# Patient Record
Sex: FEMALE | Race: WHITE | NOT HISPANIC OR LATINO | Employment: OTHER | URBAN - METROPOLITAN AREA
[De-identification: names, ages, dates, MRNs, and addresses within clinical notes are randomized per-mention and may not be internally consistent; named-entity substitution may affect disease eponyms.]

---

## 2017-06-29 ENCOUNTER — OFFICE VISIT (OUTPATIENT)
Dept: DERMATOLOGY | Facility: CLINIC | Age: 82
End: 2017-06-29

## 2017-06-29 DIAGNOSIS — L66.12 FRONTAL FIBROSING ALOPECIA: ICD-10-CM

## 2017-06-29 DIAGNOSIS — Z85.828 HISTORY OF NONMELANOMA SKIN CANCER: ICD-10-CM

## 2017-06-29 DIAGNOSIS — L82.1 SK (SEBORRHEIC KERATOSIS): Primary | ICD-10-CM

## 2017-06-29 RX ORDER — KETOCONAZOLE 20 MG/ML
SHAMPOO TOPICAL DAILY PRN
Qty: 120 ML | Refills: 11 | Status: SHIPPED | OUTPATIENT
Start: 2017-06-29 | End: 2018-06-04

## 2017-06-29 ASSESSMENT — PAIN SCALES - GENERAL: PAINLEVEL: NO PAIN (0)

## 2017-06-29 NOTE — LETTER
6/29/2017       RE: Barbi Rebolledo  [address] [address]     Dear Colleague,    Thank you for referring your patient, Barbi Rebolledo, to the [facility] DERMATOLOGY at [facility]. Please see a copy of my visit note below.    DERMATOLOGY CLINIC RETURN VISIT   June 29, 2017  Last visit: 7/7/16    DERMATOLOGY PROBLEM LIST  1. NMSC  - BCC, L nasal ala, s/p MMS Feb 2016  - superficial BCC of the right neck (2006)   2. FFA  - stable with betamethasone dipropionate lotion weekly; could benefit from more frequent use.  3. Nummular dermatitis, resolved  - fluocinonide 0.05% ointment to trunk/extremities PRN  4. Seborrheic keratoses  TBSE: 6/29/17  CC: FFA, follow-up NMSC    SUBJECTIVE: Ms. Rebolledo is a pleasant 85 year old female returning for routine follow-up. She reports no skin concerns. Her previous NMSC site has healed well and she is pleased with cosmesis.    She reports mild worsening of her FFA, but is not experiencing itching or distressed by the cosmetic aspect. She has not been using betamethasone lotion, but has continued with twice weekly ketoconazole 2% shampoo.  No other skin concerns or rashes. No other rapidly-growing, bleeding, tender, nonhealing, painful, or tender lesions elsewhere.  No recent illness, fevers, chills, drenching night sweats, loss of appetite, or unexpected weight changes.    ROS: See HPI    OBJECTIVE:   GEN: Pleasant elderly female, no acute discomfort or distress.   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 Ilia part width 1.2 anteriorly, light brown terminal hair regrowth 3-4 cm, and mild  seborrhea. Stable terminal hair growth compared to previous examination  - left nasal ala: well-healed linear scar, no evidence of recurrent disease. 3mm pearly-pink centrally-crusted friable papule  - multiple light brown variably keratotic papules/plaques on the trunk, none inflamed or traumatized.  \and PLAN   1. Hx. NMSC (including nBCC, L nasal ala)  - NER    2. FFA. Mild continued activity, goal per pt is to prevent progression.  - discussed chronic nature of this condition.  - continue ketoconazole 2% shampoo alternated with over-the-counter dandruff shampoo (for seborrheic dermatitis). Have encouraged using this at least 2-3 times per week.  - if she wishes, OK to restart betamethasone dipropionate 0.05% lotion to the frontal scalp twice daily for 2 weeks, then q2d  - discussed upcoming PRP trial; she is interested and will be added to the contact list  3. Seborrheic keratoses  - Discussed etiology, natural history, and reassured of the benign nature of these lesions. No treatment required.  RTC 1 year.    Discussed and examined with staff dermatologist Dr. Maricel Barbour.    Alfredito Ash MD  Dermatology Resident, PGY-4  Pager: [phone]

## 2017-06-29 NOTE — MR AVS SNAPSHOT
After Visit Summary   2017    Barbi Rebolledo    MRN: [id number]           Patient Information     Date Of Birth          1930        Visit Information        Provider Department      2017 9:30 AM Alfredito Ash MD [facility] Dermatology        Today's Diagnoses     SK (seborrheic keratosis)    -  1    Frontal fibrosing alopecia        History of nonmelanoma skin cancer           Follow-ups after your visit        Who to contact     Please call your clinic at [phone] to:    Ask questions about your health    Make or cancel appointments    Discuss your medicines    Learn about your test results    Speak to your doctor   If you have compliments or concerns about an experience at your clinic, or if you wish to file a complaint, please contact [facility] Patient Relations at [phone] or email us at [email or web address]         Additional Information About Your Visit        MyChart Information     Dining Secretaryt is an electronic gateway that provides easy, online access to your medical records. With [employer], you can request a clinic appointment, read your test results, renew a prescription or communicate with your care team.     To sign up for [employer] Secretary[employer] visit the website at [email or web address]   You will be asked to enter the access code listed below, as well as some personal information. Please follow the directions to create your username and password.     Your access code is: [email or web address]  Expires: 2017  6:30 AM     Your access code will  in 90 days. If you need help or a new code, please contact your [facility] or call [phone] for assistance.        Care EveryWhere ID     This is your Care EveryWhere ID. This could be used by other organizations to access your [place] medical records  [id number]         Blood Pressure from Last 3 Encounters:   No data found for BP    Weight from Last 3  Encounters:   No data found for Wt              Today, you had the following     No orders found for display         Where to get your medicines      These medications were sent to [facility] [address] [place] [address] AT [address] [place] [address] & [address] [address]     Phone:  [phone]     ketoconazole 2 % shampoo          Primary Care Provider Office Phone # Fax #    Cristina Balderas [phone]       [facility] [address] [facility] [address]        Equal Access to Services     [facility]: Hadii aad ku hadasho Soomaali, waaxda luqadaha, qaybta kaalmada adeegyada, stacy cabello hayjania adegabrielle galarza . So [facility] [phone].    ATENCIÓN: Si habla español, tiene a lopes disposición servicios gratuitos de asistencia lingüística. [facility] [phone].    We comply with applicable federal civil rights laws and [place] laws. We do not discriminate on the basis of race, color, national origin, age, disability sex, sexual orientation or gender identity.            Thank you!     Thank you for choosing [facility] DERMATOLOGY  for your care. Our goal is always to provide you with excellent care. Hearing back from our patients is one way we can continue to improve our services. Please take a few minutes to complete the written survey that you may receive in the mail after your visit with us. Thank you!             Your Updated Medication List - Protect others around you: Learn how to safely use, store and throw away your medicines at www.disposemymeds.org.          This list is accurate as of: 6/29/17 11:59 PM.  Always use your most recent med list.                   Brand Name Dispense Instructions for use Diagnosis    aspirin 81 MG tablet      Take 1 tablet by mouth daily.        betamethasone (augmented) 0.05 % lotion    DIPROLENE    60 mL    Apply topically 2 times daily    Frontal fibrosing alopecia        chlorthalidone 25 MG tablet    HYGROTON     Take 25 mg by mouth daily    History of basal cell cancer, SK (seborrheic keratosis), Cherry angioma, Dermatitis, seborrheic, Frontal fibrosing alopecia       diltiazem 240 MG 24 hr capsule      Take 1 capsule by mouth daily.        emollient cream     453 g    Apply topically daily to entire body, especially after bathing.    Dermatitis, Xerosis of skin       fluocinonide 0.05 % ointment    LIDEX    60 g    Apply twice daily to left leg as need.    Dermatitis       ketoconazole 2 % shampoo    NIZORAL    120 mL    Apply topically daily as needed for itching or irritation Lather into scalp and leave in place for 5 minutes three times per week.    Frontal fibrosing alopecia       potassium chloride 20 MEQ Packet    KLOR-CON     Take 20 mEq by mouth daily    History of basal cell cancer, SK (seborrheic keratosis), Cherry angioma, Dermatitis, seborrheic, Frontal fibrosing alopecia       sodium fluoride dental gel 1.1 % Gel topical gel    PREVIDENT     Apply to affected area At Bedtime

## 2017-06-29 NOTE — PROGRESS NOTES
DERMATOLOGY CLINIC RETURN VISIT   June 29, 2017  Last visit: 7/7/16    DERMATOLOGY PROBLEM LIST  1. NMSC  - BCC, L nasal ala, s/p MMS Feb 2016  - superficial BCC of the right neck (2006)   2. FFA  - stable with betamethasone dipropionate lotion weekly; could benefit from more frequent use.  3. Nummular dermatitis, resolved  - fluocinonide 0.05% ointment to trunk/extremities PRN  4. Seborrheic keratoses  TBSE: 6/29/17  CC: FFA, follow-up NMSC    SUBJECTIVE: Ms. Rebolledo is a pleasant 85 year old female returning for routine follow-up. She reports no skin concerns. Her previous NMSC site has healed well and she is pleased with cosmesis.    She reports mild worsening of her FFA, but is not experiencing itching or distressed by the cosmetic aspect. She has not been using betamethasone lotion, but has continued with twice weekly ketoconazole 2% shampoo.  No other skin concerns or rashes. No other rapidly-growing, bleeding, tender, nonhealing, painful, or tender lesions elsewhere.  No recent illness, fevers, chills, drenching night sweats, loss of appetite, or unexpected weight changes.    ROS: See HPI    OBJECTIVE:   GEN: Pleasant elderly female, no acute discomfort or distress.   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 Ilia part width 1.2 anteriorly, light brown terminal hair regrowth 3-4 cm, and mild seborrhea. Stable terminal hair growth compared to previous examination  - left nasal ala: well-healed linear scar, no evidence of recurrent disease. 3mm pearly-pink centrally-crusted friable papule  - multiple light brown variably keratotic papules/plaques on the trunk, none inflamed or  traumatized.  \  ASSESSMENT and PLAN   1. Hx. NMSC (including nBCC, L nasal ala)  - NER    2. FFA. Mild continued activity, goal per pt is to prevent progression.  - discussed chronic nature of this condition.  - continue ketoconazole 2% shampoo alternated with over-the-counter dandruff shampoo (for seborrheic dermatitis). Have encouraged using this at least 2-3 times per week.  - if she wishes, OK to restart betamethasone dipropionate 0.05% lotion to the frontal scalp twice daily for 2 weeks, then q2d  - discussed upcoming PRP trial; she is interested and will be added to the contact list  3. Seborrheic keratoses  - Discussed etiology, natural history, and reassured of the benign nature of these lesions. No treatment required.  RTC 1 year.    Discussed and examined with staff dermatologist Dr. Maricel Barbour.    Alfredito Ash MD  Dermatology Resident, PGY-4  Pager: [phone]

## 2018-04-30 ENCOUNTER — TRANSFERRED RECORDS (OUTPATIENT)
Dept: HEALTH INFORMATION MANAGEMENT | Facility: CLINIC | Age: 83
End: 2018-04-30

## 2018-04-30 ENCOUNTER — APPOINTMENT (OUTPATIENT)
Dept: GENERAL RADIOLOGY | Facility: CLINIC | Age: 83
DRG: 309 | End: 2018-04-30
Attending: EMERGENCY MEDICINE
Payer: MEDICARE

## 2018-04-30 ENCOUNTER — HOSPITAL ENCOUNTER (INPATIENT)
Facility: CLINIC | Age: 83
LOS: 1 days | Discharge: HOME | DRG: 309 | End: 2018-05-01
Attending: EMERGENCY MEDICINE | Admitting: INTERNAL MEDICINE
Payer: MEDICARE

## 2018-04-30 DIAGNOSIS — I48.91 ATRIAL FIBRILLATION, UNSPECIFIED TYPE (H): ICD-10-CM

## 2018-04-30 LAB
ALBUMIN SERPL-MCNC: 3.3 G/DL (ref 3.4–5)
ALP SERPL-CCNC: 96 U/L (ref 40–150)
ALT SERPL W P-5'-P-CCNC: 18 U/L (ref 0–50)
ANION GAP SERPL CALCULATED.3IONS-SCNC: 10 MMOL/L (ref 3–14)
AST SERPL W P-5'-P-CCNC: 17 U/L (ref 0–45)
BASOPHILS # BLD AUTO: 0 10E9/L (ref 0–0.2)
BASOPHILS NFR BLD AUTO: 0.1 %
BILIRUB SERPL-MCNC: 0.6 MG/DL (ref 0.2–1.3)
BUN SERPL-MCNC: 10 MG/DL (ref 7–30)
CALCIUM SERPL-MCNC: 9 MG/DL (ref 8.5–10.1)
CHLORIDE SERPL-SCNC: 83 MMOL/L (ref 94–109)
CO2 SERPL-SCNC: 33 MMOL/L (ref 20–32)
CREAT SERPL-MCNC: 0.6 MG/DL (ref 0.52–1.04)
DIFFERENTIAL METHOD BLD: ABNORMAL
EOSINOPHIL # BLD AUTO: 0 10E9/L (ref 0–0.7)
EOSINOPHIL NFR BLD AUTO: 0.2 %
ERYTHROCYTE [DISTWIDTH] IN BLOOD BY AUTOMATED COUNT: 11.8 % (ref 10–15)
GFR SERPL CREATININE-BSD FRML MDRD: >90 ML/MIN/1.7M2
GLUCOSE SERPL-MCNC: 118 MG/DL (ref 70–99)
HCT VFR BLD AUTO: 35.5 % (ref 35–47)
HGB BLD-MCNC: 12.8 G/DL (ref 11.7–15.7)
IMM GRANULOCYTES # BLD: 0 10E9/L (ref 0–0.4)
IMM GRANULOCYTES NFR BLD: 0.2 %
LYMPHOCYTES # BLD AUTO: 1.4 10E9/L (ref 0.8–5.3)
LYMPHOCYTES NFR BLD AUTO: 11.4 %
MAGNESIUM SERPL-MCNC: 2 MG/DL (ref 1.6–2.3)
MCH RBC QN AUTO: 31.7 PG (ref 26.5–33)
MCHC RBC AUTO-ENTMCNC: 36.1 G/DL (ref 31.5–36.5)
MCV RBC AUTO: 88 FL (ref 78–100)
MONOCYTES # BLD AUTO: 0.9 10E9/L (ref 0–1.3)
MONOCYTES NFR BLD AUTO: 7.2 %
NEUTROPHILS # BLD AUTO: 9.9 10E9/L (ref 1.6–8.3)
NEUTROPHILS NFR BLD AUTO: 80.9 %
NRBC # BLD AUTO: 0 10*3/UL
NRBC BLD AUTO-RTO: 0 /100
PLATELET # BLD AUTO: 229 10E9/L (ref 150–450)
POTASSIUM SERPL-SCNC: 2.5 MMOL/L (ref 3.4–5.3)
POTASSIUM SERPL-SCNC: 2.8 MMOL/L (ref 3.4–5.3)
PROT SERPL-MCNC: 7.6 G/DL (ref 6.8–8.8)
RBC # BLD AUTO: 4.04 10E12/L (ref 3.8–5.2)
SODIUM SERPL-SCNC: 126 MMOL/L (ref 133–144)
TROPONIN I BLD-MCNC: 0.13 UG/L (ref 0–0.1)
TROPONIN I SERPL-MCNC: 0.25 UG/L (ref 0–0.04)
TSH SERPL DL<=0.005 MIU/L-ACNC: 1.24 MU/L (ref 0.4–4)
WBC # BLD AUTO: 12.2 10E9/L (ref 4–11)

## 2018-04-30 PROCEDURE — A9270 NON-COVERED ITEM OR SERVICE: HCPCS | Mod: GY | Performed by: EMERGENCY MEDICINE

## 2018-04-30 PROCEDURE — 21400006 ZZH R&B CCU INTERMEDIATE UMMC

## 2018-04-30 PROCEDURE — 36415 COLL VENOUS BLD VENIPUNCTURE: CPT | Performed by: INTERNAL MEDICINE

## 2018-04-30 PROCEDURE — 25000132 ZZH RX MED GY IP 250 OP 250 PS 637: Mod: GY | Performed by: EMERGENCY MEDICINE

## 2018-04-30 PROCEDURE — 99222 1ST HOSP IP/OBS MODERATE 55: CPT | Mod: 25 | Performed by: INTERNAL MEDICINE

## 2018-04-30 PROCEDURE — 93010 ELECTROCARDIOGRAM REPORT: CPT | Mod: Z6 | Performed by: EMERGENCY MEDICINE

## 2018-04-30 PROCEDURE — 96365 THER/PROPH/DIAG IV INF INIT: CPT

## 2018-04-30 PROCEDURE — 83735 ASSAY OF MAGNESIUM: CPT | Performed by: EMERGENCY MEDICINE

## 2018-04-30 PROCEDURE — 93005 ELECTROCARDIOGRAM TRACING: CPT

## 2018-04-30 PROCEDURE — 25000125 ZZHC RX 250: Performed by: EMERGENCY MEDICINE

## 2018-04-30 PROCEDURE — 80053 COMPREHEN METABOLIC PANEL: CPT | Performed by: EMERGENCY MEDICINE

## 2018-04-30 PROCEDURE — 84484 ASSAY OF TROPONIN QUANT: CPT | Performed by: EMERGENCY MEDICINE

## 2018-04-30 PROCEDURE — 96375 TX/PRO/DX INJ NEW DRUG ADDON: CPT

## 2018-04-30 PROCEDURE — 99291 CRITICAL CARE FIRST HOUR: CPT | Mod: 25 | Performed by: EMERGENCY MEDICINE

## 2018-04-30 PROCEDURE — 96361 HYDRATE IV INFUSION ADD-ON: CPT

## 2018-04-30 PROCEDURE — 84443 ASSAY THYROID STIM HORMONE: CPT | Performed by: EMERGENCY MEDICINE

## 2018-04-30 PROCEDURE — 84132 ASSAY OF SERUM POTASSIUM: CPT | Performed by: EMERGENCY MEDICINE

## 2018-04-30 PROCEDURE — 85025 COMPLETE CBC W/AUTO DIFF WBC: CPT | Performed by: EMERGENCY MEDICINE

## 2018-04-30 PROCEDURE — 84484 ASSAY OF TROPONIN QUANT: CPT

## 2018-04-30 PROCEDURE — 99285 EMERGENCY DEPT VISIT HI MDM: CPT | Mod: 25

## 2018-04-30 PROCEDURE — 96366 THER/PROPH/DIAG IV INF ADDON: CPT

## 2018-04-30 PROCEDURE — 84443 ASSAY THYROID STIM HORMONE: CPT | Performed by: INTERNAL MEDICINE

## 2018-04-30 PROCEDURE — 83605 ASSAY OF LACTIC ACID: CPT | Performed by: INTERNAL MEDICINE

## 2018-04-30 PROCEDURE — 71045 X-RAY EXAM CHEST 1 VIEW: CPT

## 2018-04-30 PROCEDURE — 25000128 H RX IP 250 OP 636: Performed by: EMERGENCY MEDICINE

## 2018-04-30 RX ORDER — POLYETHYLENE GLYCOL 3350 17 G/17G
17 POWDER, FOR SOLUTION ORAL DAILY PRN
Status: DISCONTINUED | OUTPATIENT
Start: 2018-04-30 | End: 2018-05-01 | Stop reason: HOSPADM

## 2018-04-30 RX ORDER — AMOXICILLIN 250 MG
2 CAPSULE ORAL 2 TIMES DAILY PRN
Status: DISCONTINUED | OUTPATIENT
Start: 2018-04-30 | End: 2018-05-01 | Stop reason: HOSPADM

## 2018-04-30 RX ORDER — POTASSIUM CL/LIDO/0.9 % NACL 10MEQ/0.1L
10 INTRAVENOUS SOLUTION, PIGGYBACK (ML) INTRAVENOUS
Status: DISCONTINUED | OUTPATIENT
Start: 2018-04-30 | End: 2018-05-01 | Stop reason: HOSPADM

## 2018-04-30 RX ORDER — POTASSIUM CHLORIDE 750 MG/1
40 TABLET, EXTENDED RELEASE ORAL ONCE
Status: COMPLETED | OUTPATIENT
Start: 2018-04-30 | End: 2018-04-30

## 2018-04-30 RX ORDER — POTASSIUM CHLORIDE 750 MG/1
20-40 TABLET, EXTENDED RELEASE ORAL
Status: DISCONTINUED | OUTPATIENT
Start: 2018-04-30 | End: 2018-05-01 | Stop reason: HOSPADM

## 2018-04-30 RX ORDER — POTASSIUM CHLORIDE 29.8 MG/ML
20 INJECTION INTRAVENOUS
Status: DISCONTINUED | OUTPATIENT
Start: 2018-04-30 | End: 2018-05-01 | Stop reason: HOSPADM

## 2018-04-30 RX ORDER — POTASSIUM CHLORIDE 7.45 MG/ML
10 INJECTION INTRAVENOUS
Status: DISCONTINUED | OUTPATIENT
Start: 2018-04-30 | End: 2018-05-01 | Stop reason: HOSPADM

## 2018-04-30 RX ORDER — MAGNESIUM SULFATE HEPTAHYDRATE 40 MG/ML
4 INJECTION, SOLUTION INTRAVENOUS EVERY 4 HOURS PRN
Status: DISCONTINUED | OUTPATIENT
Start: 2018-04-30 | End: 2018-05-01 | Stop reason: HOSPADM

## 2018-04-30 RX ORDER — DILTIAZEM HYDROCHLORIDE 120 MG/1
240 CAPSULE, COATED, EXTENDED RELEASE ORAL DAILY
Status: DISCONTINUED | OUTPATIENT
Start: 2018-05-01 | End: 2018-05-01

## 2018-04-30 RX ORDER — POTASSIUM CHLORIDE 1.5 G/1.58G
20-40 POWDER, FOR SOLUTION ORAL
Status: DISCONTINUED | OUTPATIENT
Start: 2018-04-30 | End: 2018-05-01 | Stop reason: HOSPADM

## 2018-04-30 RX ORDER — AMOXICILLIN 250 MG
1 CAPSULE ORAL 2 TIMES DAILY PRN
Status: DISCONTINUED | OUTPATIENT
Start: 2018-04-30 | End: 2018-05-01 | Stop reason: HOSPADM

## 2018-04-30 RX ORDER — DILTIAZEM HYDROCHLORIDE 30 MG/1
30 TABLET, FILM COATED ORAL ONCE
Status: COMPLETED | OUTPATIENT
Start: 2018-04-30 | End: 2018-04-30

## 2018-04-30 RX ORDER — ACETAMINOPHEN 325 MG/1
650 TABLET ORAL EVERY 4 HOURS PRN
Status: DISCONTINUED | OUTPATIENT
Start: 2018-04-30 | End: 2018-05-01 | Stop reason: HOSPADM

## 2018-04-30 RX ORDER — POTASSIUM CHLORIDE 1.5 G/1.58G
40 POWDER, FOR SOLUTION ORAL ONCE
Status: COMPLETED | OUTPATIENT
Start: 2018-04-30 | End: 2018-04-30

## 2018-04-30 RX ORDER — POTASSIUM CHLORIDE 7.45 MG/ML
10 INJECTION INTRAVENOUS CONTINUOUS
Status: DISCONTINUED | OUTPATIENT
Start: 2018-04-30 | End: 2018-04-30

## 2018-04-30 RX ORDER — SODIUM CHLORIDE 9 MG/ML
INJECTION, SOLUTION INTRAVENOUS CONTINUOUS
Status: ACTIVE | OUTPATIENT
Start: 2018-04-30 | End: 2018-05-01

## 2018-04-30 RX ORDER — DILTIAZEM HCL 60 MG
60 TABLET ORAL ONCE
Status: COMPLETED | OUTPATIENT
Start: 2018-04-30 | End: 2018-05-01

## 2018-04-30 RX ORDER — DILTIAZEM HYDROCHLORIDE 5 MG/ML
10 INJECTION INTRAVENOUS ONCE
Status: COMPLETED | OUTPATIENT
Start: 2018-04-30 | End: 2018-04-30

## 2018-04-30 RX ADMIN — DILTIAZEM HYDROCHLORIDE 10 MG: 5 INJECTION INTRAVENOUS at 17:08

## 2018-04-30 RX ADMIN — POTASSIUM CHLORIDE 40 MEQ: 1.5 POWDER, FOR SOLUTION ORAL at 17:17

## 2018-04-30 RX ADMIN — Medication 2 G: at 18:24

## 2018-04-30 RX ADMIN — POTASSIUM CHLORIDE 40 MEQ: 750 TABLET, EXTENDED RELEASE ORAL at 21:40

## 2018-04-30 RX ADMIN — POTASSIUM CHLORIDE 10 MEQ: 7.46 INJECTION, SOLUTION INTRAVENOUS at 17:19

## 2018-04-30 RX ADMIN — SODIUM CHLORIDE 500 ML: 0.9 INJECTION, SOLUTION INTRAVENOUS at 16:40

## 2018-04-30 RX ADMIN — DILTIAZEM HYDROCHLORIDE 30 MG: 30 TABLET, FILM COATED ORAL at 17:32

## 2018-04-30 ASSESSMENT — ENCOUNTER SYMPTOMS
SHORTNESS OF BREATH: 0
FATIGUE: 1

## 2018-04-30 NOTE — H&P
[facility] [place]    History and Physical  Cardiology - Cards 1 Service      Date of Admission:  4/30/2018    Assessment & Plan   Barbi Rebolledo is a 87 year old female with history of hypertension who presented from clinic with atrial fibrillation/flutter with rapid ventricular response, new onset.     Atrial fibrillation/flutter with RVR  Trigger was probably related to viral URI and dehydration. Hypokalemia in the setting of chlorthalidone probably also contributed. Temporarily resolved with hydration and electrolyte replacement but recurred after planning to discharge. Add on TSH normal.  - Echocardiogram ordered  - Rate control with PTA diltiazem, will add additional IV PRN  - Discussed long term AC with patient, [id number]-BGVJ is 4 (age, hypertension, female), started apixaban 2.5 mg BID (as she is age > 80 and weight < 60 kg) in case she goes for DCCV, otherwise she may want to wait to discuss further with her PCP  - NPO @ [place] for consideration of IDALMIS/DCCV    Troponin elevation  Suspect related to tachyarrhythmia (demand ischemia). No convincing ST changes on EKG. No need for ischemia evaluation because she has other inciting factors for a-fib and she is otherwise active and asymptomatic at baseline.   - Will trend trop now that she is staying      Leukocytosis  Probably related to viral URI vs stress response. No signs/symptoms of PNA or other infectious etiology.     Hypertension  - Held chlorthalidone given electrolyte abnormalities  - Cont PTA dilt    Hyponatremia  Suspect hypovolemic hyponatremia, should improve with IVF given in ED and holding chlorthalidone on discharge from ED. Encouraged oral intake at discharge.      Hypokalemia  Due to chlorthalidone, replaced and chlorthalidone discontinued.     Discussed with Dr. Herson Magaña MD  PGY-3 Internal Medicine     Code Status   Full Code    Primary Care Physician   Cristina Balderas    Chief Complaint   Abnormal EKG  in clinic    History is obtained from the patient    History of Present Illness   Barbi Rebolledo is a 87 year old female with history of NMSC and hypertension who presents with fatigue and high heart rate.     She reports that she has not been feeling well since Friday. She had nasal congestion, rhinorrhea, post-nasal drip, malaise, and low grade fever (measured as 100.4F at home). She was starting to feel better Sunday, especially after a couple of doses of benadryl (she attributed the symptoms to allergies rather than a viral URI). Monday however she woke up and she immediately felt as though something was wrong. She was more tired. She checked her BP which was 84/54 (previous days it was normal) and her HR was 132 (had also been normal). She did not take her BP pills and came to the hospital. She denies chest pain/pressure, palpitations, dizziness, dyspnea, orthopnea, PND, LE swelling, cough, or fevers. She has had a poor appetite since Friday and feels as though she is quite dehydrated. She normally is quite active and has not had the energy to do things this weekend, but denies that she feels dyspnea, CP, palpitations, or dizziness with activity.     A ROS is otherwise negative.     Past Medical History    I have reviewed this patient's medical history and updated it with pertinent information if needed.   Past Medical History:   Diagnosis Date     Squamous cell carcinoma    Hypertension     Past Surgical History   I have reviewed this patient's surgical history and updated it with pertinent information if needed.  Past Surgical History:   Procedure Laterality Date     MOHS MICROGRAPHIC PROCEDURE         Prior to Admission Medications   Prior to Admission Medications   Prescriptions Last Dose Informant Patient Reported? Taking?   aspirin 81 MG tablet 4/30/2018 at Unknown time  Yes Yes   Sig: Take 1 tablet by mouth daily.   betamethasone, augmented, (DIPROLENE) 0.05 % lotion   No No   Sig: Apply topically 2  times daily   chlorthalidone (HYGROTON) 25 MG tablet   Yes No   Sig: Take 25 mg by mouth daily   diltiazem 240 MG 24 hr ER capsule 4/29/2018 at Unknown time  Yes Yes   Sig: Take 1 capsule by mouth daily.   emollient (VANICREAM) cream   No No   Sig: Apply topically daily to entire body, especially after bathing.   fluocinonide (LIDEX) 0.05 % ointment   No No   Sig: Apply twice daily to left leg as need.   ketoconazole (NIZORAL) 2 % shampoo   No No   Sig: Apply topically daily as needed for itching or irritation Lather into scalp and leave in place for 5 minutes three times per week.   potassium chloride (KLOR-CON) 20 MEQ packet   Yes No   Sig: Take 20 mEq by mouth daily   sodium fluoride dental gel (PREVIDENT) 1.1 % GEL   Yes No   Sig: Apply to affected area At Bedtime      Facility-Administered Medications: None     Allergies   Allergies   Allergen Reactions     Fentanyl Other (See Comments)     Sedation     Lisinopril Swelling     Swelling of tongue     Novocain [Procaine] Other (See Comments)     Tachycardia       Social History   I have reviewed this patient's social history and updated it with pertinent information if needed. Barbi Rebolledo  reports that she has never smoked. She has never used smokeless tobacco.    Family History   I have reviewed this patient's family history and updated it with pertinent information if needed.   Family History   Problem Relation Age of Onset     CANCER No family hx of      no skin cancer     Skin Cancer No family hx of      no skin cancer     Mother - CHF  Father - MI in his 90's  Two sisters - Atrial fibrillation  Brother - MI @ age 90     Review of Systems   The 10 point Review of Systems is negative other than noted in the HPI.    Physical Exam   Temp: 97.8  F (36.6  C) Temp src: Oral BP: 132/85   Heart Rate: 126 Resp: 13 SpO2: 99 % O2 Device: None (Room air)    Vital Signs with Ranges  Temp:  [97.8  F (36.6  C)] 97.8  F (36.6  C)  Heart Rate:  [126-139] 126  Resp:   [13-16] 13  BP: (101-132)/(58-97) 132/85  SpO2:  [99 %-100 %] 99 %  120 lbs 0 oz    Constitutional: No acute distress  Eyes: No scleral icterus  ENT: MMM  Respiratory: CTAB without wheezes or crackles  Cardiovascular: Irregularly irregular, tachycardic, normal S1 S2, no murmurs, normal JVP, no LE edema  GI: NABS, soft, non tender, non distended  Skin: No rashes   Musculoskeletal: No joint effusions/erythema  Neurologic: Alert and oriented x 3, non focal  Neuropsychiatric: Pleasant, calm     Data   Results for orders placed or performed during the hospital encounter of 04/30/18 (from the past 24 hour(s))   CBC with platelets differential   Result Value Ref Range    WBC 12.2 (H) 4.0 - 11.0 10e9/L    RBC Count 4.04 3.8 - 5.2 10e12/L    Hemoglobin 12.8 11.7 - 15.7 g/dL    Hematocrit 35.5 35.0 - 47.0 %    MCV 88 78 - 100 fl    MCH 31.7 26.5 - 33.0 pg    MCHC 36.1 31.5 - 36.5 g/dL    RDW 11.8 10.0 - 15.0 %    Platelet Count 229 150 - 450 10e9/L    Diff Method Automated Method     % Neutrophils 80.9 %    % Lymphocytes 11.4 %    % Monocytes 7.2 %    % Eosinophils 0.2 %    % Basophils 0.1 %    % Immature Granulocytes 0.2 %    Nucleated RBCs 0 0 /100    Absolute Neutrophil 9.9 (H) 1.6 - 8.3 10e9/L    Absolute Lymphocytes 1.4 0.8 - 5.3 10e9/L    Absolute Monocytes 0.9 0.0 - 1.3 10e9/L    Absolute Eosinophils 0.0 0.0 - 0.7 10e9/L    Absolute Basophils 0.0 0.0 - 0.2 10e9/L    Abs Immature Granulocytes 0.0 0 - 0.4 10e9/L    Absolute Nucleated RBC 0.0    Comprehensive metabolic panel   Result Value Ref Range    Sodium 126 (L) 133 - 144 mmol/L    Potassium 2.5 (LL) 3.4 - 5.3 mmol/L    Chloride 83 (L) 94 - 109 mmol/L    Carbon Dioxide 33 (H) 20 - 32 mmol/L    Anion Gap 10 3 - 14 mmol/L    Glucose 118 (H) 70 - 99 mg/dL    Urea Nitrogen 10 7 - 30 mg/dL    Creatinine 0.60 0.52 - 1.04 mg/dL    GFR Estimate >90 >60 mL/min/1.7m2    GFR Estimate If Black >90 >60 mL/min/1.7m2    Calcium 9.0 8.5 - 10.1 mg/dL    Bilirubin Total 0.6 0.2 - 1.3  mg/dL    Albumin 3.3 (L) 3.4 - 5.0 g/dL    Protein Total 7.6 6.8 - 8.8 g/dL    Alkaline Phosphatase 96 40 - 150 U/L    ALT 18 0 - 50 U/L    AST 17 0 - 45 U/L   Magnesium   Result Value Ref Range    Magnesium 2.0 1.6 - 2.3 mg/dL   Troponin POCT   Result Value Ref Range    Troponin I 0.13 (HH) 0.00 - 0.10 ug/L   XR Chest Port 1 View    Narrative    Exam: XR CHEST PORT 1 VW, 4/30/2018 4:33 PM    Indication: a fib;     Comparison: No relevant exam available.    Findings:   Single AP view of the chest. The cardiac silhouette is mildly  enlarged. Vascular calcifications noted in the aortic arch. No focal  airspace consolidation. The costophrenic angles are clear. No  pneumothorax. Bones are diffusely osteopenic. Degenerative changes of  the thoracic spine. No acute osseous pathology.      Impression    Impression:   1. Mild cardiomegaly.  2. No acute airspace disease.    I have personally reviewed the examination and initial interpretation  and I agree with the findings.    MARY SANTACRUZ MD

## 2018-04-30 NOTE — ED TRIAGE NOTES
Pt comes in for further evaluation of an abnormal ekg in clinic this afternoon. Seen in clinic yesterday for allergies, took benadryl at home but felt worse this morning. Went back to clinic and was found to be in aflutter, per pt. Feeling more fatigued, but denies cp and sob. Alert and oriented. Vss.

## 2018-04-30 NOTE — LETTER
May 25, 2018       TO: Barbi Rebolledo  [address] [facility] [address]       Dear Ms. Rebolledo,    We are writing to inform you of your test results.    No atrial fibrillation. Only short SVT recorded.  Continue with current medications and follow up on 18.    Dr.Can Olmstead Orders   Zio Patch Holter    Narrative    [address] [address]  [phone]  2018      Patient:  Barbi Rebolledo  Chart: [id number]  :  1930  Age:  88 year old  Sex:  female       Procedure:  ZioPatch Monitor.    Monitor: [id number]    Technician performing hook-up:  Jose Foreman

## 2018-04-30 NOTE — LETTER
Transition Communication Hand-off for Care Transitions to Next Level of Care Provider    Name: Barbi Rebolledo  : 1930  MRN #: [id number]  Primary Care Provider: Cristina Balderas     Primary Clinic: [facility]  [facility] [address]     Reason for Hospitalization:  Atrial fibrillation, unspecified type (H) [I48.91]  Admit Date/Time: 2018  3:40 PM  Discharge Date: 18  Payor Source: Payor: MEDICARE / Plan: MEDICARE / Product Type: Medicare   Readmission Assessment Measure (CHRISTINA) Risk Score/category: average  Reason for Communication Hand-off Referral: Multiple providers/specialties  Discharge Plan:   Concern for non-adherence with plan of care: no  Discharge Needs Assessment:  Follow-up specialty is recommended: Yes    Follow-up plan:  see discharge orders             Key Recommendations:  Post hospitalization follow up.     LEON YOON RN CC

## 2018-04-30 NOTE — ED PROVIDER NOTES
"    [place] EMERGENCY DEPARTMENT ([facility])  4/30/18   ED 15   4:16 PM   History     Chief Complaint   Patient presents with     Abnormal Ekg     The history is provided by the patient and medical records.     Barbi Rebolledo is a 87 year old female who presents for further evaluation of atrial flutter noted on [facility] clinic EKG today.  She has a history of hypertension, seasonal allergies otherwise is healthy and spry.  Patient states that lately she has been affected by seasonal allergies with clear nasal drainage and contacted [facility] clinic triage nurse who recommended over-the-counter medications.  Asked if it was okay to take Benadryl for her to dry sinuses and they agreed with this.  She took Benadryl last night and again this morning when she developed sudden onset of feeling off.  She checked her blood pressure with a [employer]roland blood pressure monitor, found that she was hypotensive at 84/54 at home.  She took a baby aspirin and contacted her clinic.  She states that she spoke with the triage nurse for 2 hours today and ultimately they advised her to come to [facility] for evaluation.  She presented there at around 2:30 PM today.  She had an EKG there that was concerning for atrial flutter and she was sent here for further treatment and evaluation of this.  The only other symptom she is having at this time is feeling more fatigued over the past 3 days.  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  No chest discomfort, chest tightness, squeezing or aching.  She denies any heart fluttering or palpitations. Patient denies taking any of her hypertension medications this morning, specifically has not taken chlorothiazide or diltiazem yet today.  No fevers.    I have reviewed the Medications, Allergies, Past Medical and Surgical History, and Social History in the [employer] system.  Past Medical History:   Diagnosis Date     Squamous cell " carcinoma        Past Surgical History:   Procedure Laterality Date     MOHS MICROGRAPHIC PROCEDURE         Family History   Problem Relation Age of Onset     CANCER No family hx of      no skin cancer     Skin Cancer No family hx of      no skin cancer       Social History   Substance Use Topics     Smoking status: Never Smoker     Smokeless tobacco: Never Used     Alcohol use Not on file      Review of Systems   Constitutional: Positive for fatigue (Worsens with exertion).   Respiratory: Negative for shortness of breath.    Cardiovascular: Negative for chest pain.       Physical Exam   BP: 101/58  Heart Rate: 139  Temp: 97.8  F (36.6  C)  Resp: 16  SpO2: 100 %      Physical Exam   Constitutional: No distress.   HENT:   Head: Atraumatic.   Mouth/Throat: Oropharynx is clear and moist. No oropharyngeal exudate.   Eyes: Pupils are equal, round, and reactive to light. No scleral icterus.   Cardiovascular: Normal heart sounds and intact distal pulses.    Fast, irregular    Pulmonary/Chest: Breath sounds normal. No respiratory distress.   Abdominal: Soft. Bowel sounds are normal. There is no tenderness.   Musculoskeletal: She exhibits no edema or tenderness.   Skin: Skin is warm. No rash noted. She is not diaphoretic.   Nursing note and vitals reviewed.      ED Course     ED Course     Procedures             EKG Interpretation:      Interpreted by Lidya Clark  Time reviewed: perEpic  Symptoms at time of EKG: None   Rhythm: atrial fibrillation - rapid  Rate: 130-140  Axis: Normal  Ectopy: none  Conduction: normal  ST Segments/ T Waves: Non-specific ST-T wave changes  Q Waves: none  Comparison to prior: No old EKG available    Clinical Impression: atrial fibrillation (new onset)                Critical Care time:  was 40 minutes for this patient excluding procedures.             Labs Ordered and Resulted from Time of ED Arrival Up to the Time of Departure from the ED - No data to display       Results for orders placed  or performed during the hospital encounter of 04/30/18   XR Chest Port 1 View    Narrative    Exam: XR CHEST PORT 1 VW, 4/30/2018 4:33 PM    Indication: a fib;     Comparison: No relevant exam available.    Findings:   Single AP view of the chest. The cardiac silhouette is mildly  enlarged. Vascular calcifications noted in the aortic arch. No focal  airspace consolidation. The costophrenic angles are clear. No  pneumothorax. Bones are diffusely osteopenic. Degenerative changes of  the thoracic spine. No acute osseous pathology.      Impression    Impression:   1. Mild cardiomegaly.  2. No acute airspace disease.    I have personally reviewed the examination and initial interpretation  and I agree with the findings.    MARY SANTACRUZ MD   CBC with platelets differential   Result Value Ref Range    WBC 12.2 (H) 4.0 - 11.0 10e9/L    RBC Count 4.04 3.8 - 5.2 10e12/L    Hemoglobin 12.8 11.7 - 15.7 g/dL    Hematocrit 35.5 35.0 - 47.0 %    MCV 88 78 - 100 fl    MCH 31.7 26.5 - 33.0 pg    MCHC 36.1 31.5 - 36.5 g/dL    RDW 11.8 10.0 - 15.0 %    Platelet Count 229 150 - 450 10e9/L    Diff Method Automated Method     % Neutrophils 80.9 %    % Lymphocytes 11.4 %    % Monocytes 7.2 %    % Eosinophils 0.2 %    % Basophils 0.1 %    % Immature Granulocytes 0.2 %    Nucleated RBCs 0 0 /100    Absolute Neutrophil 9.9 (H) 1.6 - 8.3 10e9/L    Absolute Lymphocytes 1.4 0.8 - 5.3 10e9/L    Absolute Monocytes 0.9 0.0 - 1.3 10e9/L    Absolute Eosinophils 0.0 0.0 - 0.7 10e9/L    Absolute Basophils 0.0 0.0 - 0.2 10e9/L    Abs Immature Granulocytes 0.0 0 - 0.4 10e9/L    Absolute Nucleated RBC 0.0    Comprehensive metabolic panel   Result Value Ref Range    Sodium 126 (L) 133 - 144 mmol/L    Potassium 2.5 (LL) 3.4 - 5.3 mmol/L    Chloride 83 (L) 94 - 109 mmol/L    Carbon Dioxide 33 (H) 20 - 32 mmol/L    Anion Gap 10 3 - 14 mmol/L    Glucose 118 (H) 70 - 99 mg/dL    Urea Nitrogen 10 7 - 30 mg/dL    Creatinine 0.60 0.52 - 1.04 mg/dL    GFR  Estimate >90 >60 mL/min/1.7m2    GFR Estimate If Black >90 >60 mL/min/1.7m2    Calcium 9.0 8.5 - 10.1 mg/dL    Bilirubin Total 0.6 0.2 - 1.3 mg/dL    Albumin 3.3 (L) 3.4 - 5.0 g/dL    Protein Total 7.6 6.8 - 8.8 g/dL    Alkaline Phosphatase 96 40 - 150 U/L    ALT 18 0 - 50 U/L    AST 17 0 - 45 U/L   Magnesium   Result Value Ref Range    Magnesium 2.0 1.6 - 2.3 mg/dL   Troponin POCT   Result Value Ref Range    Troponin I 0.13 (HH) 0.00 - 0.10 ug/L     Assessments & Plan (with Medical Decision Making)   In summary, this is an 87-year-old female with a history of hypertension, otherwise healthy, who comes in from clinic with a new diagnosis of atrial fibrillation on her EKG.  She was placed on a cardiac monitor which revealed A. fib with RVR and heart rates in the 120s-140s with blood pressure stable in the 110s to 160s and saturations were normal at 100% on room air.  IV was established, and labs are drawn, sent, documented in epic and remarkable for a troponin of 0.13, potassium at 2.5, sodium of 26, and were otherwise unremarkable including a mag of 2.0.  Patient was given 500 cc of normal saline along with 40 mEq of potassium p.o. and 10 mEq IV.  Patient was given Cardizem 10 mg IV with improvement of her heart rate from the 140s to the 1 teens.  She was given a subsequent load of 30 mg p.o. Cardizem.  BP was stable throughout observation here in the emergency department.  Case was discussed with Dr. Burris covering the cardiology service, and arrangements were made for the patient to be admitted    I have reviewed the nursing notes.    I have reviewed the findings, diagnosis, plan and need for follow up with the patient.    New Prescriptions    No medications on file       Final diagnoses:   None     IVanita, am serving as a trained medical scribe to document services personally performed by Lidya Clark MD based on the provider's statements to me on April 30, 2018.  This document has been checked and  approved by the attending provider.    I, Lidya Clark MD, was physically present and have reviewed and verified the accuracy of this note documented by Vanita Mcduffie, medical scribe.     4/30/2018   [facility], [place], EMERGENCY DEPARTMENT     Lidya Clark MD  05/03/18 8530       Lidya Clark MD  05/18/18 2374

## 2018-04-30 NOTE — IP AVS SNAPSHOT
MRN:[id number]                      After Visit Summary   4/30/2018    Barbi Rebolledo    MRN: [id number]           Thank you!     Thank you for choosing [facility] for your care. Our goal is always to provide you with excellent care. Hearing back from our patients is one way we can continue to improve our services. Please take a few minutes to complete the written survey that you may receive in the mail after you visit with us. Thank you!        Patient Information     Date Of Birth          5/1/1930        Designated Caregiver       Most Recent Value    Caregiver    Will someone help with your care after discharge? yes    Name of designated caregiver Nahum    Phone number of caregiver [phone]    Caregiver address same as patient      About your hospital stay     You were admitted on:  April 30, 2018 You last received care in the:  [address] [facility]    You were discharged on:  May 1, 2018        Reason for your hospital stay       You were hospitalized because you had an abnormal heart rhythm called atrial fibrillation. You were started on apixaban, a blood thinner medication, and your diltiazem was increased to try to slow down your heart rate if you go back into atrial fibrillation. You were also found to have a low potassium level. Stop taking chlorthalidone, which is likely the medication lowering your potassium level and stop taking your potassium pill. We gave you extra potassium while you were in the hospital.                  Who to Call     For medical emergencies, please call 911.  For non-urgent questions about your medical care, please call your primary care provider or clinic, [phone]          Attending Provider     Provider Specialty    Lidya Clark MD Emergency Medicine    Can, MD [facility]       Primary Care Provider Office Phone # Fax #    Cristina Balderas [phone]      After Care Instructions     Activity       Your activity upon  "discharge: activity as tolerated            Diet       Follow this diet upon discharge: Orders Placed This Encounter      Low Saturated Fat Na <2400 mg            Discharge Instructions       1. Start taking apixaban (blood thinner).  2. Take diltiazem 360 mg per day.  3. Stop taking chlorthalidone and potassium pill.  4. Follow up with Dr. Bains in Cardiology within 1 month.  5. Follow up with Dr. Balderas (PCP) within 1 month.  6. Wear Zio patch for 2 weeks.                  Follow-up Appointments     Adult [facility] Follow-up and recommended labs and tests       Follow up with primary care provider, Cristina Balderas, within 1 month to evaluate medication change and for hospital follow- up.  The following labs/tests are recommended: Basic metabolic panel.    Follow up with Dr. Bains , at the [facility] in Cardiology, within 1 month  to evaluate medication change and for hospital follow- up. No follow up labs or test are needed.    Appointments on [place] and/or [facility] (with [facility] or [facility] provider or service). Call [phone] if you haven't heard regarding these appointments within 7 days of discharge.                  Pending Results     Date and Time Order Name Status Description    5/1/2018 0839 EKG 12-lead, tracing only Preliminary             Statement of Approval     Ordered          05/01/18 1200  I have reviewed and agree with all the recommendations and orders detailed in this document.  EFFECTIVE NOW     Approved and electronically signed by:  Shaylee Monk MD             Admission Information     Date & Time Provider Department Dept. Phone    4/30/2018 Chand[facility] Bains MD [address] [facility] [place] [phone]      Your Vitals Were     Blood Pressure Pulse Temperature Respirations Height Weight    128/62 (BP Location: Left arm) 82 98.2  F (36.8  C) (Oral) 18 1.575 m (5' 2\") 53.3 kg (117 lb 9.6 oz)    Pulse Oximetry BMI (Body Mass Index)                96% 21.51 kg/m2          MyChart Information     " "[facility] lets you send messages to your doctor, view your test results, renew your prescriptions, schedule appointments and more. To sign up, go to [email or web address] . Click on \"Log in\" on the left side of the screen, which will take you to the Welcome page. Then click on \"Sign up Now\" on the right side of the page.     You will be asked to enter the access code listed below, as well as some personal information. Please follow the directions to create your username and password.     Your access code is: [email or web address]  Expires: 2018  9:42 PM     Your access code will  in 90 days. If you need help or a new code, please call your [facility] clinic or [phone].        Care EveryWhere ID     This is your Care EveryWhere ID. This could be used by other organizations to access your [facility] medical records  [id number]        Equal Access to Services     FIDELINA HILL : Hadadair Luis, waguanako bill, qajessica kaalmabeth prater, stacy galarza . So [facility] [phone].    ATENCIÓN: Si habla español, tiene a lopes disposición servicios gratuitos de asistencia lingüística. Llame al [phone].    We comply with applicable federal civil rights laws and [place] laws. We do not discriminate on the basis of race, color, national origin, age, disability, sex, sexual orientation, or gender identity.               Review of your medicines      START taking        Dose / Directions    apixaban ANTICOAGULANT 2.5 MG tablet   Commonly known as:  ELIQUIS        Dose:  2.5 mg   Take 1 tablet (2.5 mg) by mouth 2 times daily   Quantity:  60 tablet   Refills:  11       diltiazem 360 MG 24 hr CD capsule   Commonly known as:  CARDIZEM CD; CARTIA XT   Replaces:  diltiazem 240 MG 24 hr capsule        Dose:  360 mg   Start taking on:  2018   Take 1 capsule (360 mg) by mouth daily   Quantity:  30 capsule   Refills:  11         CONTINUE these medicines which have NOT CHANGED        Dose " / Directions    aspirin 81 MG tablet        Dose:  1 tablet   Take 1 tablet by mouth daily.   Refills:  0       betamethasone (augmented) 0.05 % lotion   Commonly known as:  DIPROLENE   Used for:  Frontal fibrosing alopecia        Apply topically 2 times daily   Quantity:  60 mL   Refills:  5       emollient cream   Used for:  Dermatitis, Xerosis of skin        Apply topically daily to entire body, especially after bathing.   Quantity:  453 g   Refills:  11       fluocinonide 0.05 % ointment   Commonly known as:  LIDEX   Used for:  Dermatitis        Apply twice daily to left leg as need.   Quantity:  60 g   Refills:  1       ketoconazole 2 % shampoo   Commonly known as:  NIZORAL   Used for:  Frontal fibrosing alopecia        Apply topically daily as needed for itching or irritation Lather into scalp and leave in place for 5 minutes three times per week.   Quantity:  120 mL   Refills:  11       sodium fluoride dental gel 1.1 % Gel topical gel   Commonly known as:  PREVIDENT        Apply to affected area At Bedtime   Refills:  0         STOP taking     chlorthalidone 25 MG tablet   Commonly known as:  HYGROTON           diltiazem 240 MG 24 hr capsule   Replaced by:  diltiazem 360 MG 24 hr CD capsule           potassium chloride 20 MEQ Packet   Commonly known as:  KLOR-CON                Where to get your medicines      These medications were sent to [facility] [address] [place] [address]     Phone:  [phone]     apixaban ANTICOAGULANT 2.5 MG tablet    diltiazem 360 MG 24 hr CD capsule                Protect others around you: Learn how to safely use, store and throw away your medicines at www.disposemymeds.org.             Medication List: This is a list of all your medications and when to take them. Check marks below indicate your daily home schedule. Keep this list as a reference.      Medications            Morning Afternoon Evening Bedtime As Needed    apixaban ANTICOAGULANT 2.5 MG tablet   Commonly known as:  ELIQUIS   Take 1 tablet (2.5 mg) by mouth 2 times daily   Last time this was given:  2.5 mg on 5/1/2018  9:30 AM                                      aspirin 81 MG tablet   Take 1 tablet by mouth daily.                                   betamethasone (augmented) 0.05 % lotion   Commonly known as:  DIPROLENE   Apply topically 2 times daily                                      diltiazem 360 MG 24 hr CD capsule   Commonly known as:  CARDIZEM CD; CARTIA XT   Take 1 capsule (360 mg) by mouth daily   Start taking on:  5/2/2018   Last time this was given:  120 mg on 5/1/2018 11:58 AM                                   emollient cream   Apply topically daily to entire body, especially after bathing.                                   fluocinonide 0.05 % ointment   Commonly known as:  LIDEX   Apply twice daily to left leg as need.                                   ketoconazole 2 % shampoo   Commonly known as:  NIZORAL   Apply topically daily as needed for itching or irritation Lather into scalp and leave in place for 5 minutes three times per week.                                   sodium fluoride dental gel 1.1 % Gel topical gel   Commonly known as:  PREVIDENT   Apply to affected area At Bedtime

## 2018-04-30 NOTE — IP AVS SNAPSHOT
[address] [address]    Phone:  [phone]                                       After Visit Summary   4/30/2018    Barbi Rebolledo    MRN: [id number]           After Visit Summary Signature Page     I have received my discharge instructions, and my questions have been answered. I have discussed any challenges I see with this plan with the nurse or doctor.    ..........................................................................................................................................  Patient/Patient Representative Signature      ..........................................................................................................................................  Patient Representative Print Name and Relationship to Patient    ..................................................               ................................................  Date                                            Time    ..........................................................................................................................................  Reviewed by Signature/Title    ...................................................              ..............................................  Date                                                            Time

## 2018-05-01 ENCOUNTER — APPOINTMENT (OUTPATIENT)
Dept: CARDIOLOGY | Facility: CLINIC | Age: 83
DRG: 309 | End: 2018-05-01
Attending: INTERNAL MEDICINE
Payer: MEDICARE

## 2018-05-01 VITALS
HEIGHT: 62 IN | RESPIRATION RATE: 18 BRPM | OXYGEN SATURATION: 94 % | WEIGHT: 117.6 LBS | BODY MASS INDEX: 21.64 KG/M2 | HEART RATE: 82 BPM | SYSTOLIC BLOOD PRESSURE: 134 MMHG | TEMPERATURE: 98.8 F | DIASTOLIC BLOOD PRESSURE: 69 MMHG

## 2018-05-01 DIAGNOSIS — I48.91 ATRIAL FIBRILLATION (H): Primary | ICD-10-CM

## 2018-05-01 LAB
ANION GAP SERPL CALCULATED.3IONS-SCNC: 7 MMOL/L (ref 3–14)
BUN SERPL-MCNC: 9 MG/DL (ref 7–30)
CALCIUM SERPL-MCNC: 8 MG/DL (ref 8.5–10.1)
CHLORIDE SERPL-SCNC: 93 MMOL/L (ref 94–109)
CO2 SERPL-SCNC: 29 MMOL/L (ref 20–32)
CREAT SERPL-MCNC: 0.58 MG/DL (ref 0.52–1.04)
ERYTHROCYTE [DISTWIDTH] IN BLOOD BY AUTOMATED COUNT: 12 % (ref 10–15)
GFR SERPL CREATININE-BSD FRML MDRD: >90 ML/MIN/1.7M2
GLUCOSE SERPL-MCNC: 102 MG/DL (ref 70–99)
HCT VFR BLD AUTO: 29.2 % (ref 35–47)
HGB BLD-MCNC: 10.3 G/DL (ref 11.7–15.7)
INTERPRETATION ECG - MUSE: NORMAL
LACTATE BLD-SCNC: 0.8 MMOL/L (ref 0.4–1.9)
MAGNESIUM SERPL-MCNC: 2.1 MG/DL (ref 1.6–2.3)
MCH RBC QN AUTO: 31.3 PG (ref 26.5–33)
MCHC RBC AUTO-ENTMCNC: 35.3 G/DL (ref 31.5–36.5)
MCV RBC AUTO: 89 FL (ref 78–100)
PLATELET # BLD AUTO: 204 10E9/L (ref 150–450)
POTASSIUM SERPL-SCNC: 3.3 MMOL/L (ref 3.4–5.3)
RBC # BLD AUTO: 3.29 10E12/L (ref 3.8–5.2)
SODIUM SERPL-SCNC: 129 MMOL/L (ref 133–144)
TROPONIN I SERPL-MCNC: 0.16 UG/L (ref 0–0.04)
WBC # BLD AUTO: 9.4 10E9/L (ref 4–11)

## 2018-05-01 PROCEDURE — 25000132 ZZH RX MED GY IP 250 OP 250 PS 637: Mod: GY | Performed by: INTERNAL MEDICINE

## 2018-05-01 PROCEDURE — 93005 ELECTROCARDIOGRAM TRACING: CPT

## 2018-05-01 PROCEDURE — A9270 NON-COVERED ITEM OR SERVICE: HCPCS | Mod: GY | Performed by: INTERNAL MEDICINE

## 2018-05-01 PROCEDURE — 93306 TTE W/DOPPLER COMPLETE: CPT

## 2018-05-01 PROCEDURE — 99238 HOSP IP/OBS DSCHRG MGMT 30/<: CPT | Mod: 25 | Performed by: INTERNAL MEDICINE

## 2018-05-01 PROCEDURE — 36415 COLL VENOUS BLD VENIPUNCTURE: CPT | Performed by: INTERNAL MEDICINE

## 2018-05-01 PROCEDURE — 0296T ZIO PATCH HOLTER: CPT | Performed by: STUDENT IN AN ORGANIZED HEALTH CARE EDUCATION/TRAINING PROGRAM

## 2018-05-01 PROCEDURE — 25000132 ZZH RX MED GY IP 250 OP 250 PS 637: Mod: GY | Performed by: STUDENT IN AN ORGANIZED HEALTH CARE EDUCATION/TRAINING PROGRAM

## 2018-05-01 PROCEDURE — 84484 ASSAY OF TROPONIN QUANT: CPT | Performed by: INTERNAL MEDICINE

## 2018-05-01 PROCEDURE — A9270 NON-COVERED ITEM OR SERVICE: HCPCS | Mod: GY | Performed by: STUDENT IN AN ORGANIZED HEALTH CARE EDUCATION/TRAINING PROGRAM

## 2018-05-01 PROCEDURE — 85027 COMPLETE CBC AUTOMATED: CPT | Performed by: INTERNAL MEDICINE

## 2018-05-01 PROCEDURE — 83735 ASSAY OF MAGNESIUM: CPT | Performed by: INTERNAL MEDICINE

## 2018-05-01 PROCEDURE — 0298T ZZC EXT ECG > 48HR TO 21 DAY REVIEW AND INTERPRETATN: CPT | Performed by: INTERNAL MEDICINE

## 2018-05-01 PROCEDURE — 80048 BASIC METABOLIC PNL TOTAL CA: CPT | Performed by: INTERNAL MEDICINE

## 2018-05-01 PROCEDURE — 93010 ELECTROCARDIOGRAM REPORT: CPT | Mod: 77 | Performed by: INTERNAL MEDICINE

## 2018-05-01 PROCEDURE — 25000128 H RX IP 250 OP 636: Performed by: INTERNAL MEDICINE

## 2018-05-01 PROCEDURE — 93010 ELECTROCARDIOGRAM REPORT: CPT | Performed by: INTERNAL MEDICINE

## 2018-05-01 PROCEDURE — 93306 TTE W/DOPPLER COMPLETE: CPT | Mod: 26 | Performed by: INTERNAL MEDICINE

## 2018-05-01 RX ORDER — POTASSIUM CHLORIDE 1.5 G/1.58G
80 POWDER, FOR SOLUTION ORAL ONCE
Status: COMPLETED | OUTPATIENT
Start: 2018-05-01 | End: 2018-05-01

## 2018-05-01 RX ORDER — DILTIAZEM HYDROCHLORIDE 180 MG/1
360 CAPSULE, COATED, EXTENDED RELEASE ORAL DAILY
Status: DISCONTINUED | OUTPATIENT
Start: 2018-05-02 | End: 2018-05-01 | Stop reason: HOSPADM

## 2018-05-01 RX ORDER — DILTIAZEM HYDROCHLORIDE 360 MG/1
360 CAPSULE, EXTENDED RELEASE ORAL DAILY
Qty: 30 CAPSULE | Refills: 11 | Status: SHIPPED | OUTPATIENT
Start: 2018-05-02 | End: 2019-06-03

## 2018-05-01 RX ORDER — DILTIAZEM HYDROCHLORIDE 120 MG/1
120 CAPSULE, COATED, EXTENDED RELEASE ORAL ONCE
Status: COMPLETED | OUTPATIENT
Start: 2018-05-01 | End: 2018-05-01

## 2018-05-01 RX ADMIN — POTASSIUM CHLORIDE 80 MEQ: 1.5 POWDER, FOR SOLUTION ORAL at 09:30

## 2018-05-01 RX ADMIN — SODIUM CHLORIDE: 9 INJECTION, SOLUTION INTRAVENOUS at 00:00

## 2018-05-01 RX ADMIN — APIXABAN 2.5 MG: 2.5 TABLET, FILM COATED ORAL at 00:00

## 2018-05-01 RX ADMIN — POTASSIUM CHLORIDE 40 MEQ: 1.5 POWDER, FOR SOLUTION ORAL at 00:00

## 2018-05-01 RX ADMIN — DILTIAZEM HYDROCHLORIDE 120 MG: 120 CAPSULE, EXTENDED RELEASE ORAL at 11:58

## 2018-05-01 RX ADMIN — APIXABAN 2.5 MG: 2.5 TABLET, FILM COATED ORAL at 09:30

## 2018-05-01 RX ADMIN — DILTIAZEM HYDROCHLORIDE 240 MG: 120 CAPSULE, COATED, EXTENDED RELEASE ORAL at 09:30

## 2018-05-01 RX ADMIN — DILTIAZEM HYDROCHLORIDE 60 MG: 60 TABLET, FILM COATED ORAL at 00:22

## 2018-05-01 RX ADMIN — APIXABAN 2.5 MG: 2.5 TABLET, FILM COATED ORAL at 17:52

## 2018-05-01 NOTE — PROGRESS NOTES
Admission note    Admitted from: [facility] via: stretcher and accompanied by: family.  Belongings: Placed in closet; valuables sent home with family.   Admission paperwork: complete.   Teaching: Call don't fall, use of console, meal times, visiting hours, orientation to unit, when to call for the RN (angina/sob/dizziness, etc.), and stressed the importance of safety.   Access: PIV   Telemetry:Placed on pt   Ht./Wt.: complete    A&Ox4. Vital signs stable on RA. Converted to NSR at 2300. Denied pain or SOB. IVF infusing at 125mL/hr via R PIV. K+ (2.8) replaced with 40mEq in the ED, additional 40mEq on arrival to floor per protocol. Rechecked scheduled for this AM. Voiding with adequate output. No BM overnight. Up with SBA. Appeared to rest comfortably between cares. NPO at 0000 for possible IDALMIS/DCCV. Continue to monitor and notify Cards 1 with changes/concerns.

## 2018-05-01 NOTE — DISCHARGE SUMMARY
Cardiology Discharge Summary  Barbi Rebolledo MRN: [id number]  5/1/1930  Primary care provider: Cristina Balderas  ___________________________________          Date of Admission:  4/30/2018  Date of Discharge:  5/1/2018   Admitting Physician:  Chandni Bains MD  Discharge Physician:  Chandni Bains MD   Discharging Service:  Cardiology 1     Primary Provider: Cristina Balderas         For PCP/Outpatient Follow-up:   Follow up with Dr. Bains in Cardiology within 1 month.  Follow up with Dr. Balderas (PCP) within 1 month.  Follow-up Ziopatch results.  Repeat BMP as an outpatient within 1 month.         Reason for Admission:   Atrial fibrillation          Discharge Diagnoses:   Atrial fibrillation with RVR - resolved  Demand ischemia  Hypokalemia  Hyponatremia  Hypertension  Leukocytosis - resolved         Procedures & Significant Findings:     Results for orders placed or performed during the hospital encounter of 04/30/18   XR Chest Port 1 View    Narrative    Exam: XR CHEST PORT 1 VW, 4/30/2018 4:33 PM    Indication: a fib;     Comparison: No relevant exam available.    Findings:   Single AP view of the chest. The cardiac silhouette is mildly  enlarged. Vascular calcifications noted in the aortic arch. No focal  airspace consolidation. The costophrenic angles are clear. No  pneumothorax. Bones are diffusely osteopenic. Degenerative changes of  the thoracic spine. No acute osseous pathology.      Impression    Impression:   1. Mild cardiomegaly.  2. No acute airspace disease.    I have personally reviewed the examination and initial interpretation  and I agree with the findings.    MARY SANTACRUZ MD       Transthoracic echocardiogram 5/1/2018:  Interpretation Summary  Left ventricular function, chamber size, wall motion, and wall thickness are  normal.The EF is 60-65%. Grade II or moderate diastolic dysfunction. No  regional wall motion abnormalities are seen.  The  right ventricle is normal size. Global right ventricular function is  normal.  Mild to moderate tricuspid insufficiency is present.  The inferior vena cava was dilated at 2.9 cm without respiratory variability,  consistent with increased right atrial pressure. Estimated right atrial  pressure is 10-15 mmHg.  Trivial pericardial effusion is present.            Consultations:   None         History of Present Illness:    Barbi Rebolledo is a 87 year old female with history of NMSC and hypertension who presents with fatigue and high heart rate.      She reports that she has not been feeling well since Friday. She had nasal congestion, rhinorrhea, post-nasal drip, malaise, and low grade fever (measured as 100.4F at home). She was starting to feel better Sunday, especially after a couple of doses of benadryl (she attributed the symptoms to allergies rather than a viral URI). Monday however she woke up and she immediately felt as though something was wrong. She was more tired. She checked her BP which was 84/54 (previous days it was normal) and her HR was 132 (had also been normal). She did not take her BP pills and came to the hospital. She denies chest pain/pressure, palpitations, dizziness, dyspnea, orthopnea, PND, LE swelling, cough, or fevers. She has had a poor appetite since Friday and feels as though she is quite dehydrated. She normally is quite active and has not had the energy to do things this weekend, but denies that she feels dyspnea, CP, palpitations, or dizziness with activity.     A ROS is otherwise negative.            Hospital Course by Problem:      Atrial fibrillation with RVR - resolved  Demand ischemia  URI and/or hypokalemia likely due to chlorthalidone use likely contributed to the development of atrial fibrillation with RVR. In the ED, she was given IVF, KCl, and Mg, converted to NSR, subsequently converted back to atrial fibrillation with RVR, and then back to NSR. She was admitted for  "monitoring. She remained in NSR while hospitalized. Her PTA diltiazem 240 mg was increased to 360 mg and she was started on apixaban. TTE showed normal biventricular function and dilated IVC. Troponin peaked at 0.249, likely due demand ischemia in the setting of atrial fibrillation with RVR. Discharged with a Ziopatch for 2 weeks.    Hypokalemia  Hyponatremia - improved  Potassium was repleted during hospitalization and Na improved from 126 to 129. Hypokalemia and hyponatremia likely due to chlorthalidone use. Chlorthalidone was discontinued.    Hypertension  Increased diltiazem and discontinued chlorthalidone per above.    Leukocytosis - resolved  WBC 12.2 on admission, downtrended to 9.4 on 5/1. Leukocytosis likely secondary to stress leukocytosis in the setting of atrial fibrillation with RVR or recent URI.      Physical Exam on day of Discharge:  Blood pressure 134/69, pulse 82, temperature 98.8  F (37.1  C), temperature source Oral, resp. rate 18, height 1.575 m (5' 2\"), weight 53.3 kg (117 lb 9.6 oz), SpO2 94 %.  General: alert, oriented, NAD  HEENT: NC/AT, PERRL, MMM  Respiratory: CTAB, no w/r/r, on RA  Heart/CV: RRR, normal S1 and S2, no murmurs  Abdomen/GI: soft, NTND, bowel sounds present, no masses  Extremities/MSK: no LE edema  Skin: no rashes on exposed skin  Neuro: CNII-XII grossly intact, no focal neurological deficits  Psych: appropriate affect           Pending Results:   None         Discharge Medications:     Discharge Medication List as of 5/1/2018  3:27 PM      START taking these medications    Details   apixaban ANTICOAGULANT (ELIQUIS) 2.5 MG tablet Take 1 tablet (2.5 mg) by mouth 2 times daily, Disp-60 tablet, R-11, E-Prescribe      diltiazem (CARDIZEM CD; CARTIA XT) 360 MG 24 hr CD capsule Take 1 capsule (360 mg) by mouth daily, Disp-30 capsule, R-11, E-Prescribe         CONTINUE these medications which have NOT CHANGED    Details   aspirin 81 MG tablet Take 1 tablet by mouth daily., " Historical      betamethasone, augmented, (DIPROLENE) 0.05 % lotion Apply topically 2 times dailyDisp-60 mL, [id number]      emollient (VANICREAM) cream Apply topically daily to entire body, especially after bathing.Disp-453 g, R-11Fax      fluocinonide (LIDEX) 0.05 % ointment Apply twice daily to left leg as need.Disp-60 g, [id number]      ketoconazole (NIZORAL) 2 % shampoo Apply topically daily as needed for itching or irritation Lather into scalp and leave in place for 5 minutes three times per week.Disp-120 mL, [id number]      sodium fluoride dental gel (PREVIDENT) 1.1 % GEL Apply to affected area At BedtimeHistorical         STOP taking these medications       chlorthalidone (HYGROTON) 25 MG tablet Comments:   Reason for Stopping:         diltiazem 240 MG 24 hr ER capsule Comments:   Reason for Stopping:         potassium chloride (KLOR-CON) 20 MEQ packet Comments:   Reason for Stopping:                    Discharge Instructions and Follow-Up:     Discharge Procedure Orders  Reason for your hospital stay   Order Comments: You were hospitalized because you had an abnormal heart rhythm called atrial fibrillation. You were started on apixaban, a blood thinner medication, and your diltiazem was increased to try to slow down your heart rate if you go back into atrial fibrillation. You were also found to have a low potassium level. Stop taking chlorthalidone, which is likely the medication lowering your potassium level and stop taking your potassium pill. We gave you extra potassium while you were in the hospital.     Adult [facility] Follow-up and recommended labs and tests   Order Comments: Follow up with primary care provider, Cristina Balderas, within 1 month to evaluate medication change and for hospital follow- up.  The following labs/tests are recommended: Basic metabolic panel.    Follow up with Dr. Bains , at the [facility] in Cardiology, within 1 month  to evaluate medication change and for hospital follow-  up. No follow up labs or test are needed.    Appointments on [place] and/or [facility] (with [facility] or [facility] provider or service). Call [phone] if you haven't heard regarding these appointments within 7 days of discharge.     Activity   Order Comments: Your activity upon discharge: activity as tolerated   Order Specific Question Answer Comments   Is discharge order? Yes      Discharge Instructions   Order Comments: 1. Start taking apixaban (blood thinner).  2. Take diltiazem 360 mg per day.  3. Stop taking chlorthalidone and potassium pill.  4. Follow up with Dr. Bains in Cardiology within 1 month.  5. Follow up with Dr. Balderas (PCP) within 1 month.  6. Wear Zio patch for 2 weeks.     Full Code     Diet   Order Comments: Follow this diet upon discharge: Orders Placed This Encounter     Low Saturated Fat Na <2400 mg   Order Specific Question Answer Comments   Is discharge order? Yes                Discharge Disposition:   Home         Condition on Discharge:   Discharge condition: Stable   Code status on discharge: Full Code        Date of service: 5/1/2018    The patient was discussed with Dr. Bains.    Shaylee Monk MD, PhD  Internal Medicine Resident  Pager: [phone]

## 2018-05-01 NOTE — ED NOTES
[facility], [place]   ED Nurse to Floor Handoff     Barbi Rebolledo is a 87 year old female who speaks English and lives alone,  in a home  They arrived in the ED by ambulance from clinic    ED Chief Complaint: Abnormal Ekg    ED Dx;   Final diagnoses:   Atrial fibrillation, unspecified type (H)         Needed?: No    Allergies:   Allergies   Allergen Reactions     Fentanyl Other (See Comments)     Sedation     Lisinopril Swelling     Swelling of tongue     Novocain [Procaine] Other (See Comments)     Tachycardia   .  Past Medical Hx:   Past Medical History:   Diagnosis Date     Squamous cell carcinoma       Baseline Mental status: WDL  Current Mental Status changes: at basesline    Infection present or suspected this encounter: no  Sepsis suspected: No  Isolation type: No active isolations     Activity level - Baseline/Home:  Independent  Activity Level - Current:   Independent    Bariatric equipment needed?: No    In the ED these meds were given:   Medications   potassium chloride 10 mEq in 100 mL sterile water intermittent infusion (premix) (0 mEq Intravenous Stopped 4/30/18 2009)   0.9% sodium chloride BOLUS (0 mLs Intravenous Stopped 4/30/18 2009)   diltiazem (CARDIZEM) injection 10 mg (10 mg Intravenous Given 4/30/18 1708)   potassium chloride (KLOR-CON) Packet 40 mEq (40 mEq Oral Given 4/30/18 1717)   magnesium sulfate 2 g in NS intermittent infusion (PharMEDium or FV Cmpd) (2 g Intravenous New Bag 4/30/18 1824)   diltiazem (CARDIZEM) tablet 30 mg (30 mg Oral Given 4/30/18 1732)       Drips running?  Yes, mag    Home pump  No    Current LDAs  Peripheral IV 04/30/18 Right Upper forearm (Active)   Number of days:0       Labs results:   Labs Ordered and Resulted from Time of ED Arrival Up to the Time of Departure from the ED   CBC WITH PLATELETS DIFFERENTIAL - Abnormal; Notable for the following:        Result Value    WBC 12.2 (*)     Absolute Neutrophil 9.9 (*)     All  "other components within normal limits   COMPREHENSIVE METABOLIC PANEL - Abnormal; Notable for the following:     Sodium 126 (*)     Potassium 2.5 (*)     Chloride 83 (*)     Carbon Dioxide 33 (*)     Glucose 118 (*)     Albumin 3.3 (*)     All other components within normal limits   TROPONIN POCT - Abnormal; Notable for the following:     Troponin I 0.13 (*)     All other components within normal limits   MAGNESIUM   POTASSIUM   PERIPHERAL IV CATHETER   ISTAT TROPONIN NURSING POCT       Imaging Studies:   Recent Results (from the past 24 hour(s))   XR Chest Port 1 View    Narrative    Exam: XR CHEST PORT 1 VW, 4/30/2018 4:33 PM    Indication: a fib;     Comparison: No relevant exam available.    Findings:   Single AP view of the chest. The cardiac silhouette is mildly  enlarged. Vascular calcifications noted in the aortic arch. No focal  airspace consolidation. The costophrenic angles are clear. No  pneumothorax. Bones are diffusely osteopenic. Degenerative changes of  the thoracic spine. No acute osseous pathology.      Impression    Impression:   1. Mild cardiomegaly.  2. No acute airspace disease.    I have personally reviewed the examination and initial interpretation  and I agree with the findings.    MARY SANTACRUZ MD       Recent vital signs:   /67  Temp 97.8  F (36.6  C) (Oral)  Resp 14  Wt 54.4 kg (120 lb)  SpO2 93%  BMI 21.95 kg/m2    Cardiac Rhythm: Normal Sinus  Pt needs tele? Yes  Skin/wound Issues: None    Code Status: Full Code    Pain control: pt had none    Nausea control: pt had none    Abnormal labs/tests/findings requiring intervention: K 2.5    Family present during ED course? No   Family Comments/Social Situation comments: none    Tasks needing completion: K recheck at 2100    Pt presented with c/o \"allergies\". Found to be in Afib with RVR. K 2.5. IV and PO dilt given. Potassium replaced. Mag given. Pt now in NSR. A/Ox4, independent. Need K recheck at 2100.     Naomie Marie, " RN  [phone]

## 2018-05-01 NOTE — PROGRESS NOTES
Care Coordinator - Discharge Planning    Admission Date/Time:  4/30/2018  Attending MD:  Chandni Bains MD   Data  Chart reviewed, discussed with interdisciplinary team.   Patient was admitted for:   1. Atrial fibrillation, unspecified type (H)    Assessment   Concerns with insurance coverage for discharge needs: None.  Current Living Situation: Patient said that her sonShekhar lives with her. Pt added that she is independent with her own care.   Support System: Supportive and Involved family.   Services Involved: none currently.   Transportation at Discharge: Family or friend will provide  Transportation to Medical Appointments: family will provide.     Coordination of Care and Referrals: No home care needs per pt and pt's son.   Pt is sinus rhythm now, no cardioversion done. Pt to have Ziopatch placed and will be on Apixaban for anticoagulation.    Plan  Anticipated Discharge Date:  5/1/18  Anticipated Discharge Plan:  Discharge to home.     CTS Handoff completed:  YES    LEON YOON RN BSN  Care Coordinator  [phone]

## 2018-05-02 ENCOUNTER — CARE COORDINATION (OUTPATIENT)
Dept: CARE COORDINATION | Facility: CLINIC | Age: 83
End: 2018-05-02

## 2018-05-02 LAB — INTERPRETATION ECG - MUSE: NORMAL

## 2018-05-02 NOTE — PLAN OF CARE
Problem: Patient Care Overview  Goal: Plan of Care/Patient Progress Review  DISCHARGE                         5/1/2018  6:07 PM  ----------------------------------------------------------------------------  Discharged to: Home  Via: Automobile  Accompanied by: Shekhar Díaz    Discharge Instructions: The following items were discussed with pt upon discharge: activity, medications, follow up appointments, aftercare instructions (Holter Event Monitor), and symptoms to monitor for/reasons to contact MD. Patient verbalized understanding.    Prescriptions: To be filled by [facility] on [facility] pharmacy per pt's request; medication list reviewed and patient was able to verbalize/teach back medications to this nurse.    Follow Up Appointments: Patient to follow up with Cardiology and PCP in one month, appointments not made as of yet.      Belongings: All sent with pt  IV: out  Telemetry: off, Holter event monitor placed by EKG staff prior to discharge. Pt will remain on this for two weeks.    Pt exhibits understanding of above discharge instructions; all questions answered.    Discharge Paperwork: Signed, copied, and sent home with patient.     Kimmy Pemberton RN  Cardiology

## 2018-05-03 ENCOUNTER — CARE COORDINATION (OUTPATIENT)
Dept: CARDIOLOGY | Facility: CLINIC | Age: 83
End: 2018-05-03

## 2018-05-03 NOTE — PROGRESS NOTES
Tell me how you have been doing since you were discharged from the hospital.       ACTIVITY  How is your activity tolerance? Normal.  Back to level prior to hospitalization         ASSISTANCE  Do you have someone at home to assist you with your daily activities?          MEDICATIONS  I would like to review your discharge medications and answer any questions you may have about them and also make sure you have all the medications that are new to you, and discuss any changes that were made to your pre-hospital medications.     Is someone helping you to set up your medications? Independent.          FOLLOW UP  Do you have a follow up appointment with your provider?  no What other discharge instructions do you have?   Are you to get labs, procedures or tests before you see your provider?  none      The clinic coordinators for the cardiology clinic will contact you to schedule with Dr. Bains in the next 1-2 months.         CONTACT INFORMATION  Please feel free to call us with any other questions or symptoms that are concerning for you at [phone], if it is after 4:30 in the afternoon, or a weekend please call [phone] and ask for the on call specialist.  We want to do everything we can to help prevent you needing to return to the ED, so please do not hesitate to call us.       DIET  It is recommended you follow a 2000 mg low sodium diet, avoid processed food, canned food and fast food restaurants.

## 2018-05-14 ENCOUNTER — MEDICAL CORRESPONDENCE (OUTPATIENT)
Facility: CLINIC | Age: 83
End: 2018-05-14
Payer: MEDICARE

## 2018-05-14 PROCEDURE — 93227 XTRNL ECG REC<48 HR R&I: CPT | Performed by: INTERNAL MEDICINE

## 2018-06-04 ENCOUNTER — OFFICE VISIT (OUTPATIENT)
Dept: CARDIOLOGY | Facility: CLINIC | Age: 83
End: 2018-06-04
Attending: INTERNAL MEDICINE
Payer: COMMERCIAL

## 2018-06-04 VITALS
HEIGHT: 62 IN | HEART RATE: 74 BPM | WEIGHT: 114.2 LBS | DIASTOLIC BLOOD PRESSURE: 82 MMHG | BODY MASS INDEX: 21.02 KG/M2 | OXYGEN SATURATION: 96 % | SYSTOLIC BLOOD PRESSURE: 138 MMHG

## 2018-06-04 DIAGNOSIS — I48.0 PAROXYSMAL ATRIAL FIBRILLATION (H): Primary | ICD-10-CM

## 2018-06-04 DIAGNOSIS — I10 ESSENTIAL HYPERTENSION: ICD-10-CM

## 2018-06-04 PROCEDURE — 93005 ELECTROCARDIOGRAM TRACING: CPT | Mod: ZF

## 2018-06-04 PROCEDURE — 93010 ELECTROCARDIOGRAM REPORT: CPT | Mod: ZP | Performed by: INTERNAL MEDICINE

## 2018-06-04 PROCEDURE — G0463 HOSPITAL OUTPT CLINIC VISIT: HCPCS | Mod: 25,ZF

## 2018-06-04 PROCEDURE — 99215 OFFICE O/P EST HI 40 MIN: CPT | Mod: ZP | Performed by: INTERNAL MEDICINE

## 2018-06-04 ASSESSMENT — PAIN SCALES - GENERAL: PAINLEVEL: NO PAIN (0)

## 2018-06-04 NOTE — NURSING NOTE
Chief Complaint   Patient presents with     Follow Up For     : 89 y/o female for post hospital f/u of new onset atrial fibrillation.     Vitals taken and recorded. Medications reconciliation. EKG performed. SMA IVET

## 2018-06-04 NOTE — MR AVS SNAPSHOT
After Visit Summary   6/4/2018    Barbi Rebolledo    MRN: [id number]           Patient Information     Date Of Birth          5/1/1930        Visit Information        Provider Department      6/4/2018 2:00 PM Chandni Bains MD [facility]        Today's Diagnoses     Atrial fibrillation (H)          Care Instructions    You were seen today in the Cardiovascular Clinic at the [facility].      Cardiology Providers you saw during your visit:  Dr. Bains    Diagnosis:  Paroxysmal atrial fibrillation    Results:  EKG reviewed    Recommendations:   Stop aspirin.    Follow-up:  1 year with Dr. Bains.      For emergencies call 911.    For any scheduling needs, please call [phone]. Option 1 then option 3    Thank you for your visit today!     Please call if you have any questions or concerns.  Jose Marques RN              Follow-ups after your visit        Additional Services     Follow-Up with Cardiologist                 Future tests that were ordered for you today     Open Future Orders        Priority Expected Expires Ordered    Follow-Up with Cardiologist Routine 6/4/2019 6/5/2019 6/4/2018            Who to contact     If you have questions or need follow up information about today's clinic visit or your schedule please contact [facility] directly at [phone].  Normal or non-critical lab and imaging results will be communicated to you by MyChart, letter or phone within 4 business days after the clinic has received the results. If you do not hear from us within 7 days, please contact the clinic through MyChart or phone. If you have a critical or abnormal lab result, we will notify you by phone as soon as possible.  Submit refill requests through [employer] or call your pharmacy and they will forward the refill request to us. Please allow 3 business days for your refill to be completed.          Additional Information About Your Visit        Care EveryWhere ID     This is your  "Care EveryWhere ID. This could be used by other organizations to access your [place] medical records  [id number]        Your Vitals Were     Pulse Height Pulse Oximetry BMI (Body Mass Index)          74 1.575 m (5' 2\") 96% 20.89 kg/m2         Blood Pressure from Last 3 Encounters:   06/04/18 138/82   05/01/18 134/69   02/08/16 186/89    Weight from Last 3 Encounters:   06/04/18 51.8 kg (114 lb 3.2 oz)   04/30/18 53.3 kg (117 lb 9.6 oz)   06/16/14 57.5 kg (126 lb 12.2 oz)              We Performed the Following     EKG 12-lead, tracing only (Same Day)     Follow-Up with Cardiologist        Primary Care Provider Office Phone # Fax #    Cristina Balderas [phone]       [facility] [address] [facility] [id number]        Equal Access to Services     [facility] HospitalOMAYRA : Hadii aad ku hadasho Soomaali, waaxda luqadaha, qaybta kaalmada adeegyada, waxay idiin haystacyn jeancarlos galarza . So [facility] [phone].    ATENCIÓN: Si habla español, tiene a lopes disposición servicios gratuitos de asistencia lingüística. Llame al [phone].    We comply with applicable federal civil rights laws and [place] laws. We do not discriminate on the basis of race, color, national origin, age, disability, sex, sexual orientation, or gender identity.            Thank you!     Thank you for choosing [facility]  for your care. Our goal is always to provide you with excellent care. Hearing back from our patients is one way we can continue to improve our services. Please take a few minutes to complete the written survey that you may receive in the mail after your visit with us. Thank you!             Your Updated Medication List - Protect others around you: Learn how to safely use, store and throw away your medicines at www.disposemymeds.org.          This list is accurate as of 6/4/18  2:57 PM.  Always use your most recent med list.                   Brand Name Dispense Instructions for " use Diagnosis    apixaban ANTICOAGULANT 2.5 MG tablet    ELIQUIS    60 tablet    Take 1 tablet (2.5 mg) by mouth 2 times daily    Atrial fibrillation, unspecified type (H)       diltiazem 360 MG 24 hr CD capsule    CARDIZEM CD; CARTIA XT    30 capsule    Take 1 capsule (360 mg) by mouth daily    Atrial fibrillation, unspecified type (H)       emollient cream     453 g    Apply topically daily to entire body, especially after bathing.    Dermatitis, Xerosis of skin       sodium fluoride dental gel 1.1 % Gel topical gel    PREVIDENT     Apply to affected area At Bedtime

## 2018-06-04 NOTE — LETTER
6/4/2018      RE: Barbi Rebolledo  [address] [facility] [id number]       Dear Colleague,    Thank you for the opportunity to participate in the care of your patient, Barbi Rebolledo, at the [facility]. Please see a copy of my visit note below.    Chief complaint: None, Follow-up hospital discharge    HPI: Ms. Barbi Rebolledo is a 88 year old  female with PMH significant for hypertension and paroxysmal atrial fibrillation.  Barbi was admitted to the hospital on 4/30 due to symptoms of fatigue and atrial fibrillation with rapid ventricular response.  In the emergency department she converted spontaneously to sinus rhythm, however the rhythm then went back to atrial fibrillation again.  She was admitted to inpatient service for observation. She was on chlorthalidone and her potassium was 2.5 on admission.  Chlorthalidone was discontinued and potassium was replaced.  She was also on diltiazem, the dose of which was increased from 300 to 360 mg for better rate control.  She was also started on apixaban 2.5 mg twice a day. No bleeding issues with apixaban.  She is here today for one-month follow-up.  She denies any recurrence in her symptoms.  Overall she is doing very well from cardiac standpoint.The patient denies a history of chest discomfort, dyspnea, PND, orthopnea, pedal edema, palpitations, lightheadedness, and syncope  The patient's risk factor profile is: (+) HTN, (-) diabetes, (-) hyperlipidemia, (-) tobacco use, (-) family Hx CAD. No prior hx of cardiac disease.    I have reviewed her echocardiogram from 5/1/2018 which shows a structurally normal heart. I have also reviewed her ECG in clinic today which shows sinus rhythm, normal IN, QRS and QTc intervals.    Medications, personal, family, and social history reviewed with patient and revised.    PAST MEDICAL HISTORY:  Past Medical History:   Diagnosis Date     Squamous cell carcinoma   "      CURRENT MEDICATIONS:  Current Outpatient Prescriptions   Medication Sig Dispense Refill     apixaban ANTICOAGULANT (ELIQUIS) 2.5 MG tablet Take 1 tablet (2.5 mg) by mouth 2 times daily 60 tablet 11     diltiazem (CARDIZEM CD; CARTIA XT) 360 MG 24 hr CD capsule Take 1 capsule (360 mg) by mouth daily 30 capsule 11     emollient (VANICREAM) cream Apply topically daily to entire body, especially after bathing. 453 g 11     sodium fluoride dental gel (PREVIDENT) 1.1 % GEL Apply to affected area At Bedtime         PAST SURGICAL HISTORY:  Past Surgical History:   Procedure Laterality Date     MOHS MICROGRAPHIC PROCEDURE         ALLERGIES:     Allergies   Allergen Reactions     Fentanyl Other (See Comments)     Sedation     Lisinopril Swelling     Swelling of tongue     Novocain [Procaine] Other (See Comments)     Tachycardia       FAMILY HISTORY:  Family History   Problem Relation Age of Onset     CANCER No family hx of      no skin cancer     Skin Cancer No family hx of      no skin cancer         SOCIAL HISTORY:  Social History   Substance Use Topics     Smoking status: Never Smoker     Smokeless tobacco: Never Used     Alcohol use Not on file       ROS:   Constitutional: No fever, chills, or sweats. Weight stable.   ENT: No visual disturbance, ear ache, epistaxis, sore throat.   Cardiovascular: As per HPI.   Respiratory: No cough, hemoptysis.    GI: No nausea, vomiting, hematemesis, melena, or hematochezia.   : No hematuria.   Integument: Negative.   Psychiatric: Negative.   Hematologic:  Easy bruising, no easy bleeding.  Neuro: Negative.   Endocrinology: No significant heat or cold intolerance   Musculoskeletal: No myalgia.    Exam:  /82 (BP Location: Left arm, Patient Position: Chair, Cuff Size: Adult Small)  Pulse 74  Ht 1.575 m (5' 2\")  Wt 51.8 kg (114 lb 3.2 oz)  SpO2 96%  BMI 20.89 kg/m2  GENERAL APPEARANCE: healthy, alert and no distress  HEENT: no icterus, no central cyanosis  LYMPH/NECK: no " adenopathy, no asymmetry, JVP not elevated, no carotid bruits.  RESPIRATORY: lungs clear to auscultation - no rales, rhonchi or wheezes, no use of accessory muscles, no retractions, respirations are unlabored, normal respiratory rate  CARDIOVASCULAR: regular rhythm, normal S1, S2, no S3 or S4 and no murmur, click or rub, precordium quiet with normal PMI.  GI: soft, non tender  EXTREMITIES: peripheral pulses normal, no edema  NEURO: alert and oriented to person/place/time, normal speech,and affect  VASC: Radial, dorsalis pedis and posterior tibialis pulses are normal in volumes and symmetric bilaterally.   SKIN: no ecchymoses, no rashes     I have reviewed the labs and personally reviewed the imaging below and made my comment in the assessment and plan.    Labs:  CBC RESULTS:   Lab Results   Component Value Date    WBC 9.4 05/01/2018    RBC 3.29 (L) 05/01/2018    HGB 10.3 (L) 05/01/2018    HCT 29.2 (L) 05/01/2018    MCV 89 05/01/2018    MCH 31.3 05/01/2018    MCHC 35.3 05/01/2018    RDW 12.0 05/01/2018     05/01/2018       BMP RESULTS:  Lab Results   Component Value Date     (L) 05/01/2018    POTASSIUM 3.3 (L) 05/01/2018    CHLORIDE 93 (L) 05/01/2018    CO2 29 05/01/2018    ANIONGAP 7 05/01/2018     (H) 05/01/2018    BUN 9 05/01/2018    CR 0.58 05/01/2018    GFRESTIMATED >90 05/01/2018    GFRESTBLACK >90 05/01/2018    DEANA 8.0 (L) 05/01/2018        INR RESULTS:  Lab Results   Component Value Date    INR 1.14 06/15/2014       Echocardiogram 5/1/2018  Left ventricular function, chamber size, wall motion, and wall thickness are  normal.The EF is 60-65%. Grade II or moderate diastolic dysfunction. No  regional wall motion abnormalities are seen.  The right ventricle is normal size. Global right ventricular function is  normal.  Mild to moderate tricuspid insufficiency is present.  The inferior vena cava was dilated at 2.9 cm without respiratory variability,  consistent with increased right atrial  pressure. Estimated right atrial  pressure is 10-15 mmHg.  Trivial pericardial effusion is present.     This study was compared with the study from 9/10/10 . The IVC is more dilated  consistent with increase right atrial pressures.    EKG (personally reviewed) 6/4/2018: sinus rhythm, normal IA, QRS and QTc intervals.    Zio-Patch 5/14/2018 for 11 hours: No atrial fibrillation    Assessment and Plan:   Ms. Barbi Rebolledo is a 88 year old  female with PMH significant for hypertension and paroxysmal atrial fibrillation.  She had a paroxysmal atrial fibrillation episode with RVR almost 1 month ago which spontaneously converted to sinus rhythm.  She was admitted overnight. On hospital discharge chlorthalidone was discontinued due to hypokalemia and diltiazem dose was increased from 300 to 360 mg.      Over the past 1 month blood pressure is well controlled with only increased dose of diltiazem. No recurrence in atrial fibrillation. She is on low-dose apixaban 2.5 mg twice daily.  I have discontinued aspirin today that she was taking with apixaban.      The patient is doing very well overall very well.  Continue current therapy. Return to clinic in 1 year.    A total of 45 minutes spent face-to-face with greater than 50% of the time spent in counseling and coordinating cares of the issues above.   Please donot hesitate to contact me if you have any questions or concerns. Again, thank you for allowing me to participate in the care of your patient.    Chandni LINARES MD  [facility] Division of Cardiology  Pager [phone]

## 2018-06-04 NOTE — NURSING NOTE
Med Reconcile: Reviewed and verified all current medications with the patient. The updated medication list was printed and given to the patient.  Return Appointment: 1 year with Dr. Bains.  Patient given instructions regarding scheduling next clinic visit. Patient demonstrated understanding of this information and agreed to call with further questions or concerns.  Medication Change: d/c aspirin.  Patient was educated regarding prescribed medication change, including discussion of the indication, administration, side effects, and when to report to MD or RN. Patient demonstrated understanding of this information and agreed to call with further questions or concerns.  Patient stated she understood all health information given and agreed to call with further questions or concerns.

## 2018-06-05 LAB — INTERPRETATION ECG - MUSE: NORMAL

## 2018-06-07 NOTE — PROGRESS NOTES
Chief complaint: None, Follow-up hospital discharge    HPI: Ms. Barbi Rebolledo is a 88 year old  female with PMH significant for hypertension and paroxysmal atrial fibrillation.  Barbi was admitted to the hospital on 4/30 due to symptoms of fatigue and atrial fibrillation with rapid ventricular response.  In the emergency department she converted spontaneously to sinus rhythm, however the rhythm then went back to atrial fibrillation again.  She was admitted to inpatient service for observation. She was on chlorthalidone and her potassium was 2.5 on admission.  Chlorthalidone was discontinued and potassium was replaced.  She was also on diltiazem, the dose of which was increased from 300 to 360 mg for better rate control.  She was also started on apixaban 2.5 mg twice a day. No bleeding issues with apixaban.  She is here today for one-month follow-up.  She denies any recurrence in her symptoms.  Overall she is doing very well from cardiac standpoint.The patient denies a history of chest discomfort, dyspnea, PND, orthopnea, pedal edema, palpitations, lightheadedness, and syncope  The patient's risk factor profile is: (+) HTN, (-) diabetes, (-) hyperlipidemia, (-) tobacco use, (-) family Hx CAD. No prior hx of cardiac disease.    I have reviewed her echocardiogram from 5/1/2018 which shows a structurally normal heart. I have also reviewed her ECG in clinic today which shows sinus rhythm, normal MO, QRS and QTc intervals.    Medications, personal, family, and social history reviewed with patient and revised.    PAST MEDICAL HISTORY:  Past Medical History:   Diagnosis Date     Squamous cell carcinoma        CURRENT MEDICATIONS:  Current Outpatient Prescriptions   Medication Sig Dispense Refill     apixaban ANTICOAGULANT (ELIQUIS) 2.5 MG tablet Take 1 tablet (2.5 mg) by mouth 2 times daily 60 tablet 11     diltiazem (CARDIZEM CD; CARTIA XT) 360 MG 24 hr CD capsule Take 1 capsule (360 mg) by mouth daily 30 capsule 11  "    emollient (VANICREAM) cream Apply topically daily to entire body, especially after bathing. 453 g 11     sodium fluoride dental gel (PREVIDENT) 1.1 % GEL Apply to affected area At Bedtime         PAST SURGICAL HISTORY:  Past Surgical History:   Procedure Laterality Date     MOHS MICROGRAPHIC PROCEDURE         ALLERGIES:     Allergies   Allergen Reactions     Fentanyl Other (See Comments)     Sedation     Lisinopril Swelling     Swelling of tongue     Novocain [Procaine] Other (See Comments)     Tachycardia       FAMILY HISTORY:  Family History   Problem Relation Age of Onset     CANCER No family hx of      no skin cancer     Skin Cancer No family hx of      no skin cancer         SOCIAL HISTORY:  Social History   Substance Use Topics     Smoking status: Never Smoker     Smokeless tobacco: Never Used     Alcohol use Not on file       ROS:   Constitutional: No fever, chills, or sweats. Weight stable.   ENT: No visual disturbance, ear ache, epistaxis, sore throat.   Cardiovascular: As per HPI.   Respiratory: No cough, hemoptysis.    GI: No nausea, vomiting, hematemesis, melena, or hematochezia.   : No hematuria.   Integument: Negative.   Psychiatric: Negative.   Hematologic:  Easy bruising, no easy bleeding.  Neuro: Negative.   Endocrinology: No significant heat or cold intolerance   Musculoskeletal: No myalgia.    Exam:  /82 (BP Location: Left arm, Patient Position: Chair, Cuff Size: Adult Small)  Pulse 74  Ht 1.575 m (5' 2\")  Wt 51.8 kg (114 lb 3.2 oz)  SpO2 96%  BMI 20.89 kg/m2  GENERAL APPEARANCE: healthy, alert and no distress  HEENT: no icterus, no central cyanosis  LYMPH/NECK: no adenopathy, no asymmetry, JVP not elevated, no carotid bruits.  RESPIRATORY: lungs clear to auscultation - no rales, rhonchi or wheezes, no use of accessory muscles, no retractions, respirations are unlabored, normal respiratory rate  CARDIOVASCULAR: regular rhythm, normal S1, S2, no S3 or S4 and no murmur, click or " rub, precordium quiet with normal PMI.  GI: soft, non tender  EXTREMITIES: peripheral pulses normal, no edema  NEURO: alert and oriented to person/place/time, normal speech,and affect  VASC: Radial, dorsalis pedis and posterior tibialis pulses are normal in volumes and symmetric bilaterally.   SKIN: no ecchymoses, no rashes     I have reviewed the labs and personally reviewed the imaging below and made my comment in the assessment and plan.    Labs:  CBC RESULTS:   Lab Results   Component Value Date    WBC 9.4 05/01/2018    RBC 3.29 (L) 05/01/2018    HGB 10.3 (L) 05/01/2018    HCT 29.2 (L) 05/01/2018    MCV 89 05/01/2018    MCH 31.3 05/01/2018    MCHC 35.3 05/01/2018    RDW 12.0 05/01/2018     05/01/2018       BMP RESULTS:  Lab Results   Component Value Date     (L) 05/01/2018    POTASSIUM 3.3 (L) 05/01/2018    CHLORIDE 93 (L) 05/01/2018    CO2 29 05/01/2018    ANIONGAP 7 05/01/2018     (H) 05/01/2018    BUN 9 05/01/2018    CR 0.58 05/01/2018    GFRESTIMATED >90 05/01/2018    GFRESTBLACK >90 05/01/2018    DEANA 8.0 (L) 05/01/2018        INR RESULTS:  Lab Results   Component Value Date    INR 1.14 06/15/2014       Echocardiogram 5/1/2018  Left ventricular function, chamber size, wall motion, and wall thickness are  normal.The EF is 60-65%. Grade II or moderate diastolic dysfunction. No  regional wall motion abnormalities are seen.  The right ventricle is normal size. Global right ventricular function is  normal.  Mild to moderate tricuspid insufficiency is present.  The inferior vena cava was dilated at 2.9 cm without respiratory variability,  consistent with increased right atrial pressure. Estimated right atrial  pressure is 10-15 mmHg.  Trivial pericardial effusion is present.     This study was compared with the study from 9/10/10 . The IVC is more dilated  consistent with increase right atrial pressures.    EKG (personally reviewed) 6/4/2018: sinus rhythm, normal VT, QRS and QTc  intervals.    Zio-Patch 5/14/2018 for 11 hours: No atrial fibrillation    Assessment and Plan:   Ms. Barbi Rebolledo is a 88 year old  female with PMH significant for hypertension and paroxysmal atrial fibrillation.  She had a paroxysmal atrial fibrillation episode with RVR almost 1 month ago which spontaneously converted to sinus rhythm.  She was admitted overnight. On hospital discharge chlorthalidone was discontinued due to hypokalemia and diltiazem dose was increased from 300 to 360 mg.      Over the past 1 month blood pressure is well controlled with only increased dose of diltiazem. No recurrence in atrial fibrillation. She is on low-dose apixaban 2.5 mg twice daily.  I have discontinued aspirin today that she was taking with apixaban.      The patient is doing very well overall very well.  Continue current therapy. Return to clinic in 1 year.    A total of 45 minutes spent face-to-face with greater than 50% of the time spent in counseling and coordinating cares of the issues above.   Please donot hesitate to contact me if you have any questions or concerns. Again, thank you for allowing me to participate in the care of your patient.    Chandni LINARES MD  [facility] Division of Cardiology  Pager [phone]

## 2018-08-15 ENCOUNTER — OFFICE VISIT (OUTPATIENT)
Dept: DERMATOLOGY | Facility: CLINIC | Age: 83
End: 2018-08-15
Payer: COMMERCIAL

## 2018-08-15 DIAGNOSIS — Z85.828 HISTORY OF BASAL CELL CARCINOMA: Primary | ICD-10-CM

## 2018-08-15 DIAGNOSIS — L81.4 LENTIGINES: ICD-10-CM

## 2018-08-15 DIAGNOSIS — L82.0 SEBORRHEIC KERATOSES, INFLAMED: ICD-10-CM

## 2018-08-15 DIAGNOSIS — L82.1 SEBORRHEIC KERATOSES: ICD-10-CM

## 2018-08-15 ASSESSMENT — PAIN SCALES - GENERAL: PAINLEVEL: NO PAIN (0)

## 2018-08-15 NOTE — PROGRESS NOTES
[facility] Dermatology Note      Dermatology Problem List:  1. NMSC  - BCC, L nasal ala, s/p MMS Feb 2016  - superficial BCC of the right neck (2006)   2. FFA  - stable with betamethasone dipropionate lotion weekly; could benefit from more frequent use.  3. Nummular dermatitis, resolved  - fluocinonide 0.05% ointment to trunk/extremities PRN  4. Seborrheic keratoses s/p cryo  TBSE: 8/15/18    CC:   Chief Complaint   Patient presents with     Skin Check     Barbi is here today for a skin check- has some areas of concern on her face.          Encounter Date: Aug 15, 2018    History of Present Illness:  Ms. Barbi Rebolledo is a 88 year old female who presents for a FBSE. She has a hx of BCC and reports no problems with either excision site. She has a few spots of concern - specifically some raised lesions o her face which get dry and irritated occasionally. She otherwise is feeling well. The patient denies painful, itching, tingling or bleeding lesions unless otherwise noted. Her last FBSE was 6/29/17.    Past Medical History:   Patient Active Problem List   Diagnosis     BCC (basal cell carcinoma), face     Frontal fibrosing alopecia     Xerosis of skin     Dermatitis, seborrheic     Inflamed seborrheic keratosis     SK (seborrheic keratosis)     History of nonmelanoma skin cancer     Atrial fibrillation (H)     Past Medical History:   Diagnosis Date     Squamous cell carcinoma      Past Surgical History:   Procedure Laterality Date     MOHS MICROGRAPHIC PROCEDURE         Social History:  The patient is retired. The patient denies use of tanning beds. Does endorse several years of sun worshiping in the past.    Family History:  There is no family history of skin cancer. There is no family history of melanoma.    Medications:  Current Outpatient Prescriptions   Medication Sig Dispense Refill     apixaban ANTICOAGULANT (ELIQUIS) 2.5 MG tablet Take 1 tablet (2.5 mg) by mouth 2 times daily 60 tablet  11     diltiazem (CARDIZEM CD; CARTIA XT) 360 MG 24 hr CD capsule Take 1 capsule (360 mg) by mouth daily 30 capsule 11     emollient (VANICREAM) cream Apply topically daily to entire body, especially after bathing. 453 g 11     sodium fluoride dental gel (PREVIDENT) 1.1 % GEL Apply to affected area At Bedtime       Allergies   Allergen Reactions     Fentanyl Other (See Comments)     Sedation     Lisinopril Swelling     Swelling of tongue     Novocain [Procaine] Other (See Comments)     Tachycardia         Review of Systems:  -Constitutional: The patient denies fatigue, fevers, chills, unintended weight loss, and night sweats.  -Skin: As above in HPI. No additional skin concerns.    Physical exam:  Vitals: There were no vitals taken for this visit.  GEN: This is a well developed, well-nourished female in no acute distress, in a pleasant mood.    SKIN: Full skin, which includes the head/face, both arms, chest, back, abdomen,both legs, genitalia and/or groin buttocks, digits and/or nails, was examined.  -There is no erythema, telangectasias, nodularity, or pigmentation on the nose of right lateral neck.  -Scattered brown macules on sun exposed areas.  -There are waxy stuck on tan to brown papules on the right temple and left cheek.  -No other lesions of concern on areas examined.     Impression/Plan:  1. Seborrheic keratosis, inflamed - x2 - on face    Cryotherapy procedure note: After verbal consent and discussion of risks and benefits including but no limited to dyspigmentation/scar, blister, and pain, 2 was(were) treated with 1-2mm freeze border for 2 cycles with liquid nitrogen. Post cryotherapy instructions were provided.     2. Seborrheic keratosis, non irritated    Reassured benign    3. Solar lentigines -face    Reassured benign    4. Hx of BCC x 2 - nose and right neck - NERD    Continue with annual skin exams    Sunscreen: Apply 20 minutes prior to going outdoors and reapply every two hours, when wet or  sweating. We recommend using an SPF 30 or higher, and to use one that is water resistant.       CC Dr. Gautam on close of this encounter.  Follow-up in 1 year, earlier for new or changing lesions.       Staff Involved:  Staff Only  All risks, benefits and alternatives were discussed with patient.  Patient is in agreement and understands the assessment and plan.  All questions were answered.    Virgen Armas PA-C  [facility]: Phone: [phone], Fax: [phone]

## 2018-08-15 NOTE — NURSING NOTE
Dermatology Rooming Note    Barbi Rebolledo's goals for this visit include:   Chief Complaint   Patient presents with     Skin Check     Barbi is here today for a skin check- has some areas of concern on her face.      Heike Goldstein MA

## 2018-08-15 NOTE — LETTER
8/15/2018       RE: Barbi Rebolledo  [address] [address]     Dear Colleague,    Thank you for referring your patient, Barbi Rebolledo, to the [facility] DERMATOLOGY at [facility]. Please see a copy of my visit note below.    [facility] Dermatology Note      Dermatology Problem List:  1. NMSC  - BCC, L nasal ala, s/p MMS Feb 2016  - superficial BCC of the right neck (2006)   2. FFA  - stable with betamethasone dipropionate lotion weekly; could benefit from more frequent use.  3. Nummular dermatitis, resolved  - fluocinonide 0.05% ointment to trunk/extremities PRN  4. Seborrheic keratoses s/p cryo  TBSE: 8/15/18    CC:   Chief Complaint   Patient presents with     Skin Check     Barbi is here today for a skin check- has some areas of concern on her face.          Encounter Date: Aug 15, 2018    History of Present Illness:  Ms. Barbi Rebolledo is a 88 year old female who presents for a FBSE. She has a hx of BCC and reports no problems with either excision site. She has a few spots of concern - specifically some raised lesions o her face which get dry and irritated occasionally. She otherwise is feeling well. The patient denies painful, itching, tingling or bleeding lesions unless otherwise noted. Her last FBSE was 6/29/17.    Past Medical History:   Patient Active Problem List   Diagnosis     BCC (basal cell carcinoma), face     Frontal fibrosing alopecia     Xerosis of skin     Dermatitis, seborrheic     Inflamed seborrheic keratosis     SK (seborrheic keratosis)     History of nonmelanoma skin cancer     Atrial fibrillation (H)     Past Medical History:   Diagnosis Date     Squamous cell carcinoma      Past Surgical History:   Procedure Laterality Date     MOHS MICROGRAPHIC PROCEDURE         Social History:  The patient is retired. The patient denies use of tanning beds. Does endorse several years of sun worshiping in the  past.    Family History:  There is no family history of skin cancer. There is no family history of melanoma.    Medications:  Current Outpatient Prescriptions   Medication Sig Dispense Refill     apixaban ANTICOAGULANT (ELIQUIS) 2.5 MG tablet Take 1 tablet (2.5 mg) by mouth 2 times daily 60 tablet 11     diltiazem (CARDIZEM CD; CARTIA XT) 360 MG 24 hr CD capsule Take 1 capsule (360 mg) by mouth daily 30 capsule 11     emollient (VANICREAM) cream Apply topically daily to entire body, especially after bathing. 453 g 11     sodium fluoride dental gel (PREVIDENT) 1.1 % GEL Apply to affected area At Bedtime       Allergies   Allergen Reactions     Fentanyl Other (See Comments)     Sedation     Lisinopril Swelling     Swelling of tongue     Novocain [Procaine] Other (See Comments)     Tachycardia     Physical exam:  Vitals: There were no vitals taken for this visit.  GEN: This is a well developed, well-nourished female in no acute distress, in a pleasant mood.    SKIN: Full skin, which includes the head/face, both arms, chest, back, abdomen,both legs, genitalia and/or groin buttocks, digits and/or nails, was examined.  -There is no erythema, telangectasias, nodularity, or pigmentation on the nose of right lateral neck.  -Scattered brown macules on sun exposed areas.  -There are waxy stuck on tan to brown papules on the right temple and left cheek.  -No other lesions of concern on areas examined.     Impression/Plan:  1. Seborrheic keratosis, inflamed - x2 - on face    Cryotherapy procedure note: After verbal consent and discussion of risks and benefits including but no limited to dyspigmentation/scar, blister, and pain, 2 was(were) treated with 1-2mm freeze border for 2 cycles with liquid nitrogen. Post cryotherapy instructions were provided.     2. Seborrheic keratosis, non irritated    Reassured benign    3. Solar lentigines -face    Reassured benign    4. Hx of BCC x 2 - nose and right neck - NERD    Continue with  annual skin exams    Sunscreen: Apply 20 minutes prior to going outdoors and reapply every two hours, when wet or sweating. We recommend using an SPF 30 or higher, and to use one that is water resistant.       CC Dr. Gautam on close of this encounter.  Follow-up in 1 year, earlier for new or changing lesions.       Staff Involved:  Staff Only  All risks, benefits and alternatives were discussed with patient.  Patient is in agreement and understands the assessment and plan.  All questions were answered.    Virgen Armas PA-C  [facility]: Phone: [phone], Fax: [phone]

## 2018-08-15 NOTE — MR AVS SNAPSHOT
After Visit Summary   8/15/2018    Barbi Rebolledo    MRN: [id number]           Patient Information     Date Of Birth          5/1/1930        Visit Information        Provider Department      8/15/2018 9:45 AM Virgen Armas PA-C [facility] Dermatology        Today's Diagnoses     History of basal cell carcinoma    -  1    Seborrheic keratoses, inflamed        Lentigines        Seborrheic keratoses           Follow-ups after your visit        Follow-up notes from your care team     Return in about 1 year (around 8/15/2019).      Who to contact     Please call your clinic at [phone] to:    Ask questions about your health    Make or cancel appointments    Discuss your medicines    Learn about your test results    Speak to your doctor            Additional Information About Your Visit        Care EveryWhere ID     This is your Care EveryWhere ID. This could be used by other organizations to access your [place] medical records  [id number]         Blood Pressure from Last 3 Encounters:   06/04/18 138/82   05/01/18 134/69   02/08/16 186/89    Weight from Last 3 Encounters:   06/04/18 51.8 kg (114 lb 3.2 oz)   04/30/18 53.3 kg (117 lb 9.6 oz)   06/16/14 57.5 kg (126 lb 12.2 oz)              We Performed the Following     DESTRUCT BENIGN LESION, UP TO 14        Primary Care Provider Office Phone # Fax #    Cristina Balderas [phone]       [facility] [address] [address]        Equal Access to Services     FIDELINA HILL AH: Hadii aad ku haio Toby, waaxda luqadaha, qaybta kaalmada jeancarlosyada, waxjaye destiney palma mariselagabrielle dacosta. So [facility] [phone].    ATENCIÓN: Si habla español, tiene a lopes disposición servicios gratuitos de asistencia lingüística. Llame al [phone].    We comply with applicable federal civil rights laws and [place] laws. We do not discriminate on the basis of race, color, national origin, age, disability,  sex, sexual orientation, or gender identity.            Thank you!     Thank you for choosing [facility] DERMATOLOGY  for your care. Our goal is always to provide you with excellent care. Hearing back from our patients is one way we can continue to improve our services. Please take a few minutes to complete the written survey that you may receive in the mail after your visit with us. Thank you!             Your Updated Medication List - Protect others around you: Learn how to safely use, store and throw away your medicines at www.disposemymeds.org.          This list is accurate as of 8/15/18 11:20 AM.  Always use your most recent med list.                   Brand Name Dispense Instructions for use Diagnosis    apixaban ANTICOAGULANT 2.5 MG tablet    ELIQUIS    60 tablet    Take 1 tablet (2.5 mg) by mouth 2 times daily    Atrial fibrillation, unspecified type (H)       diltiazem 360 MG 24 hr CD capsule    CARDIZEM CD; CARTIA XT    30 capsule    Take 1 capsule (360 mg) by mouth daily    Atrial fibrillation, unspecified type (H)       emollient cream     453 g    Apply topically daily to entire body, especially after bathing.    Dermatitis, Xerosis of skin       sodium fluoride dental gel 1.1 % Gel topical gel    PREVIDENT     Apply to affected area At Bedtime

## 2019-02-03 NOTE — NURSING NOTE
Dermatology Rooming Note    Barbi Rebolledo's goals for this visit include:   Chief Complaint   Patient presents with     Derm Problem     Barbi comes to clinic today for a skin exam. States no concerns at this time.     Katelynn Willams, CMA    
Vaginal spotting

## 2019-04-08 DIAGNOSIS — I48.91 ATRIAL FIBRILLATION, UNSPECIFIED TYPE (H): ICD-10-CM

## 2019-05-01 ENCOUNTER — DOCUMENTATION ONLY (OUTPATIENT)
Dept: CARE COORDINATION | Facility: CLINIC | Age: 84
End: 2019-05-01

## 2019-05-20 ENCOUNTER — DOCUMENTATION ONLY (OUTPATIENT)
Dept: CARE COORDINATION | Facility: CLINIC | Age: 84
End: 2019-05-20

## 2019-06-03 ENCOUNTER — OFFICE VISIT (OUTPATIENT)
Dept: CARDIOLOGY | Facility: CLINIC | Age: 84
End: 2019-06-03
Attending: INTERNAL MEDICINE
Payer: MEDICARE

## 2019-06-03 VITALS
OXYGEN SATURATION: 97 % | HEART RATE: 74 BPM | SYSTOLIC BLOOD PRESSURE: 127 MMHG | HEIGHT: 62 IN | DIASTOLIC BLOOD PRESSURE: 52 MMHG | WEIGHT: 118 LBS | BODY MASS INDEX: 21.71 KG/M2

## 2019-06-03 DIAGNOSIS — I48.0 PAROXYSMAL ATRIAL FIBRILLATION (H): Primary | ICD-10-CM

## 2019-06-03 DIAGNOSIS — I48.91 ATRIAL FIBRILLATION, UNSPECIFIED TYPE (H): ICD-10-CM

## 2019-06-03 PROCEDURE — 93010 ELECTROCARDIOGRAM REPORT: CPT | Mod: ZP | Performed by: INTERNAL MEDICINE

## 2019-06-03 PROCEDURE — 99213 OFFICE O/P EST LOW 20 MIN: CPT | Mod: ZP | Performed by: INTERNAL MEDICINE

## 2019-06-03 PROCEDURE — G0463 HOSPITAL OUTPT CLINIC VISIT: HCPCS | Mod: 25,ZF

## 2019-06-03 PROCEDURE — 93005 ELECTROCARDIOGRAM TRACING: CPT | Mod: ZF

## 2019-06-03 RX ORDER — HYDROCHLOROTHIAZIDE 25 MG/1
TABLET ORAL
Refills: 3 | COMMUNITY
Start: 2019-03-11 | End: 2020-08-10

## 2019-06-03 RX ORDER — POTASSIUM CHLORIDE 1.5 G/1.58G
20 POWDER, FOR SOLUTION ORAL DAILY
Qty: 90 PACKET | Refills: 3 | Status: ON HOLD | COMMUNITY
Start: 2019-06-03 | End: 2023-03-22

## 2019-06-03 RX ORDER — DILTIAZEM HYDROCHLORIDE 240 MG/1
240 CAPSULE, EXTENDED RELEASE ORAL DAILY
Qty: 90 CAPSULE | Refills: 3 | COMMUNITY
Start: 2019-06-03 | End: 2020-08-10

## 2019-06-03 RX ORDER — POTASSIUM CHLORIDE 1500 MG/1
20 TABLET, EXTENDED RELEASE ORAL 2 TIMES DAILY
Qty: 90 TABLET | Refills: 3 | Status: SHIPPED | OUTPATIENT
Start: 2019-06-03 | End: 2019-06-03

## 2019-06-03 ASSESSMENT — MIFFLIN-ST. JEOR: SCORE: 913.49

## 2019-06-03 ASSESSMENT — PAIN SCALES - GENERAL: PAINLEVEL: NO PAIN (0)

## 2019-06-03 NOTE — LETTER
6/3/2019      RE: Barbi Rebolledo  [address] [address]       Dear Colleague,    Thank you for the opportunity to participate in the care of your patient, Barbi Rebolledo, at the [facility] at [facility]. Please see a copy of my visit note below.    Chief complaint: None, Follow-up hospital discharge    HPI: Ms. Barbi Rebolledo is a 88 year old  female with PMH significant for hypertension and paroxysmal atrial fibrillation.  Barbi was admitted to the hospital on 4/30 due to symptoms of fatigue and atrial fibrillation with rapid ventricular response.  In the emergency department she converted spontaneously to sinus rhythm, however the rhythm then went back to atrial fibrillation again.  She was admitted to inpatient service for observation. She was on chlorthalidone and her potassium was 2.5 on admission.  Chlorthalidone was discontinued and potassium was replaced.  She was also on diltiazem, the dose of which was increased from 300 to 360 mg for better rate control.  She was also started on apixaban 2.5 mg twice a day. No bleeding issues with apixaban.  She is here today for one-month follow-up.  She denies any recurrence in her symptoms.  Overall she is doing very well from cardiac standpoint.The patient denies a history of chest discomfort, dyspnea, PND, orthopnea, pedal edema, palpitations, lightheadedness, and syncope  The patient's risk factor profile is: (+) HTN, (-) diabetes, (-) hyperlipidemia, (-) tobacco use, (-) family Hx CAD. No prior hx of cardiac disease.    I have reviewed her echocardiogram from 5/1/2018 which shows a structurally normal heart. I have also reviewed her ECG in clinic today which shows sinus rhythm, normal TX, QRS and QTc intervals.    Medications, personal, family, and social history reviewed with patient and revised.    Interval history 6/3/2019:  The patient returns for one-year follow-up.  She is overall doing  very well from cardiac standpoint.  She is on Eliquis 2.5 mg twice daily for one episode of paroxysmal atrial fibrillation in 2018.  No recurrence since then.  Denies palpitations, dizziness, syncope, chest pain, shortness of breath.  The patient reports mild lower extremity edema likely due to diltiazem she is on for hypertension treatment.  She follows with her primary care every 6 months at [facility] and her last potassium was 4.4.  She takes hydrochlorothiazide 25 mg, diltiazem to 40 mg and potassium 20 mEq daily.  She is physically very active.  She is an artist ().    PAST MEDICAL HISTORY:  Past Medical History:   Diagnosis Date     Squamous cell carcinoma        CURRENT MEDICATIONS:  Current Outpatient Medications   Medication Sig Dispense Refill     apixaban ANTICOAGULANT (ELIQUIS) 2.5 MG tablet Take 1 tablet (2.5 mg) by mouth 2 times daily 180 tablet 0     diltiazem (CARDIZEM CD; CARTIA XT) 360 MG 24 hr CD capsule Take 1 capsule (360 mg) by mouth daily 30 capsule 11     emollient (VANICREAM) cream Apply topically daily to entire body, especially after bathing. 453 g 11     sodium fluoride dental gel (PREVIDENT) 1.1 % GEL Apply to affected area At Bedtime         PAST SURGICAL HISTORY:  Past Surgical History:   Procedure Laterality Date     MOHS MICROGRAPHIC PROCEDURE         ALLERGIES:     Allergies   Allergen Reactions     Fentanyl Other (See Comments)     Sedation     Lisinopril Swelling     Swelling of tongue     Novocain [Procaine] Other (See Comments)     Tachycardia       FAMILY HISTORY:  Family History   Problem Relation Age of Onset     Cancer No family hx of         no skin cancer     Skin Cancer No family hx of         no skin cancer         SOCIAL HISTORY:  Social History     Tobacco Use     Smoking status: Never Smoker     Smokeless tobacco: Never Used   Substance Use Topics     Alcohol use: Not on file     Drug use: Not on file       ROS:   Constitutional: No fever, chills, or sweats.  "Weight stable.   ENT: No visual disturbance, ear ache, epistaxis, sore throat.   Cardiovascular: As per HPI.   Respiratory: No cough, hemoptysis.    GI: No nausea, vomiting, hematemesis, melena, or hematochezia.   : No hematuria.   Integument: Negative.   Psychiatric: Negative.   Hematologic: No easy bruising, no easy bleeding.  Neuro: Negative.   Endocrinology: No significant heat or cold intolerance   Musculoskeletal: No myalgia.    Exam:  /52 (BP Location: Right arm, Patient Position: Chair, Cuff Size: Adult Regular)   Pulse 74   Ht 1.575 m (5' 2\")   Wt 53.5 kg (118 lb)   SpO2 97%   BMI 21.58 kg/m     GENERAL APPEARANCE: healthy, alert and no distress  HEENT: no icterus, no central cyanosis  LYMPH/NECK:  JVP not elevated  RESPIRATORY: lungs clear to auscultation - no rales, rhonchi or wheezes, no use of accessory muscles, no retractions, respirations are unlabored, normal respiratory rate  CARDIOVASCULAR: regular rhythm, normal S1, S2, no S3 or S4 and no murmur, click or rub, precordium quiet with normal PMI.  EXTREMITIES: peripheral pulses normal, no edema  NEURO: alert and oriented to person/place/time, normal speech,and affect   SKIN: no ecchymoses, no rashes     I have reviewed the labs and personally reviewed the imaging below and made my comment in the assessment and plan.    Labs:  CBC RESULTS:   Lab Results   Component Value Date    WBC 9.4 05/01/2018    RBC 3.29 (L) 05/01/2018    HGB 10.3 (L) 05/01/2018    HCT 29.2 (L) 05/01/2018    MCV 89 05/01/2018    MCH 31.3 05/01/2018    MCHC 35.3 05/01/2018    RDW 12.0 05/01/2018     05/01/2018       BMP RESULTS:  Lab Results   Component Value Date     (L) 05/01/2018    POTASSIUM 3.3 (L) 05/01/2018    CHLORIDE 93 (L) 05/01/2018    CO2 29 05/01/2018    ANIONGAP 7 05/01/2018     (H) 05/01/2018    BUN 9 05/01/2018    CR 0.58 05/01/2018    GFRESTIMATED >90 05/01/2018    GFRESTBLACK >90 05/01/2018    DEANA 8.0 (L) 05/01/2018        INR " RESULTS:  Lab Results   Component Value Date    INR 1.14 06/15/2014       Echocardiogram 5/1/2018  Left ventricular function, chamber size, wall motion, and wall thickness are  normal.The EF is 60-65%. Grade II or moderate diastolic dysfunction. No  regional wall motion abnormalities are seen.  The right ventricle is normal size. Global right ventricular function is  normal.  Mild to moderate tricuspid insufficiency is present.  The inferior vena cava was dilated at 2.9 cm without respiratory variability,  consistent with increased right atrial pressure. Estimated right atrial  pressure is 10-15 mmHg.  Trivial pericardial effusion is present.     This study was compared with the study from 9/10/10 . The IVC is more dilated  consistent with increase right atrial pressures.    EKG (personally reviewed) 6/4/2018: sinus rhythm, normal ME, QRS and QTc intervals.    Zio-Patch 5/14/2018 for 11 hours: No atrial fibrillation    Assessment and Plan:   Ms. Barbi Rebolledo is a 89 year old  female with PMH significant for hypertension and paroxysmal atrial fibrillation.  She had an episode paroxysmal atrial fibrillation with RVR in May 2018 which spontaneously converted to sinus rhythm.  She was admitted overnight. On hospital discharge chlorthalidone was discontinued due to hypokalemia and diltiazem dose was increased from 300 to 360 mg.      She followed up with her primary care since then and diltiazem dose was decreased to 240 mg due to lower extremity edema. Currently blood pressure is well controlled with diltiazem 240 and hydrochlorothiazide 25 mg.  Patient's paroxysmal atrial fibrillation was in the setting of hypokalemia for which she is taking potassium replacement now.  Last potassium was 4.4 almost 6 months ago (patient formation).  She is on apixaban 2.5 mg twice daily since last year.  Denies any bleeding episodes.    She is currently asymptomatic. No medication changes today.    Return to clinic 1 year.    A  total of 20 minutes spent face-to-face with greater than 50% of the time spent in counseling and coordinating cares of the issues above.     Please donot hesitate to contact me if you have any questions or concerns. Again, thank you for allowing me to participate in the care of your patient.    Chandni LINARES MD  [facility] Division of Cardiology  Pager [phone]

## 2019-06-03 NOTE — NURSING NOTE
Vitals completed successfully and medication reconciled.     Marah Cadet CMA  3:13 PM  Chief Complaint   Patient presents with     Follow Up     Annual follow up

## 2019-06-03 NOTE — PATIENT INSTRUCTIONS
It was a pleasure to see you in the cardiology clinic today.     We are encouraging the use of [employer] to communicate with your Healthcare Provider.  If you have any questions, call  Wesley Becker LPN, at [phone].  Press Option #1 for the [facility], and then press Option #3 for nursing.        If you have an urgent need after hours (8:00 am to 4:30 pm) please call [phone] and ask for the cardiology fellow on call.

## 2019-06-04 LAB — INTERPRETATION ECG - MUSE: NORMAL

## 2020-08-10 ENCOUNTER — VIRTUAL VISIT (OUTPATIENT)
Dept: CARDIOLOGY | Facility: CLINIC | Age: 85
End: 2020-08-10
Attending: INTERNAL MEDICINE
Payer: MEDICARE

## 2020-08-10 DIAGNOSIS — I10 BENIGN ESSENTIAL HYPERTENSION: ICD-10-CM

## 2020-08-10 DIAGNOSIS — I48.0 PAROXYSMAL ATRIAL FIBRILLATION (H): Primary | ICD-10-CM

## 2020-08-10 PROCEDURE — 99441 ZZC PHYSICIAN TELEPHONE EVALUATION 5-10 MIN: CPT | Performed by: INTERNAL MEDICINE

## 2020-08-10 RX ORDER — DILTIAZEM HYDROCHLORIDE 300 MG/1
300 CAPSULE, COATED, EXTENDED RELEASE ORAL DAILY
Qty: 90 CAPSULE | Refills: 3 | COMMUNITY
Start: 2020-08-10 | End: 2021-10-04

## 2020-08-10 ASSESSMENT — PAIN SCALES - GENERAL: PAINLEVEL: NO PAIN (0)

## 2020-08-10 NOTE — LETTER
"8/10/2020      RE: Barbi Rebolledo  [address] [id number]       Dear Colleague,    Thank you for the opportunity to participate in the care of your patient, Barbi Rebolledo, at the [facility] at [facility]. Please see a copy of my visit note below.    Barbi Rebolledo is a 89 year old female who is being evaluated via a billable telephone visit.      Phone call duration: 10 minutes (phone call started at 3 PM)    Vitals - Patient Reported  Systolic (Patient Reported): 122  Diastolic (Patient Reported): 71  Weight (Patient Reported): 53.3 kg (117 lb 8 oz)  Height (Patient Reported): 157.5 cm (5' 2\")  BMI (Based on Pt Reported Ht/Wt): 21.49  SpO2 (Patient Reported): 96  Temperature (Patient Reported): 97.7  F (36.5  C)  Pulse (Patient Reported): 61  Pain Score: No Pain (0)(No SOB)    HPI: MsJake Rebolledo is a 88 year old  female with PMH significant for hypertension and paroxysmal atrial fibrillation.  Barbi was admitted to the hospital on 4/30 due to symptoms of fatigue and atrial fibrillation with rapid ventricular response.  In the emergency department she converted spontaneously to sinus rhythm, however the rhythm then went back to atrial fibrillation again.  She was admitted to inpatient service for observation. She was on chlorthalidone and her potassium was 2.5 on admission.  Chlorthalidone was discontinued and potassium was replaced.  She was also on diltiazem, the dose of which was increased from 300 to 360 mg for better rate control.  She was also started on apixaban 2.5 mg twice a day. No bleeding issues with apixaban.  She is here today for one-month follow-up.  She denies any recurrence in her symptoms.  Overall she is doing very well from cardiac standpoint.The patient denies a history of chest discomfort, dyspnea, PND, orthopnea, pedal edema, palpitations, lightheadedness, and syncope  The patient's risk factor profile is: (+) " HTN, (-) diabetes, (-) hyperlipidemia, (-) tobacco use, (-) family Hx CAD. No prior hx of cardiac disease.    I have reviewed her echocardiogram from 5/1/2018 which shows a structurally normal heart. I have also reviewed her ECG in clinic today which shows sinus rhythm, normal DE, QRS and QTc intervals.    Medications, personal, family, and social history reviewed with patient and revised.    Interval history 6/3/2019:  The patient returns for one-year follow-up.  She is overall doing very well from cardiac standpoint.  She is on Eliquis 2.5 mg twice daily for one episode of paroxysmal atrial fibrillation in 2018.  No recurrence since then.  Denies palpitations, dizziness, syncope, chest pain, shortness of breath.  The patient reports mild lower extremity edema likely due to diltiazem she is on for hypertension treatment.  She follows with her primary care every 6 months at [facility] and her last potassium was 4.4.  She takes hydrochlorothiazide 25 mg, diltiazem to 40 mg and potassium 20 mEq daily.  She is physically very active.  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  The patient reports overall doing well.  She continues to paint and physically active.  No recurrent atrial fibrillation since the single episode in 2018.  Patient denies palpitations, dizziness, chest pain or lower extremity edema.  Patient today tells me that she no longer takes hydrochlorothiazide.  She is on apixaban 2.5 mg twice daily, diltiazem 300 mg daily and potassium replacement.  Most recent lab test from February of this year (available in care everywhere) shows normal BMP, potassium and CBC.  Patient follows with Dr. Baldreas at [facility].  Patient reports overall feeling well.      PAST MEDICAL HISTORY:  Past Medical History:   Diagnosis Date     Squamous cell carcinoma         CURRENT MEDICATIONS:  Current Outpatient Medications   Medication Sig Dispense Refill     apixaban ANTICOAGULANT (ELIQUIS) 2.5 MG tablet Take 1 tablet (2.5 mg) by mouth 2 times daily 180 tablet 0     diltiazem ER (DILT-XR) 240 MG 24 hr ER beaded capsule Take 1 capsule (240 mg) by mouth daily 90 capsule 3     emollient (VANICREAM) cream Apply topically daily to entire body, especially after bathing. 453 g 11     hydrochlorothiazide (HYDRODIURIL) 25 MG tablet TK 1 T PO QD  3     potassium chloride (KLOR-CON) 20 MEQ packet Take 20 mEq by mouth daily 90 packet 3     sodium fluoride dental gel (PREVIDENT) 1.1 % GEL Apply to affected area At Bedtime         PAST SURGICAL HISTORY:  Past Surgical History:   Procedure Laterality Date     MOHS MICROGRAPHIC PROCEDURE         ALLERGIES:     Allergies   Allergen Reactions     Fentanyl Other (See Comments)     Sedation     Lisinopril Swelling     Swelling of tongue     Novocain [Procaine] Other (See Comments)     Tachycardia       FAMILY HISTORY:  Family History   Problem Relation Age of Onset     Cancer No family hx of         no skin cancer     Skin Cancer No family hx of         no skin cancer         SOCIAL HISTORY:  Social History     Tobacco Use     Smoking status: Never Smoker     Smokeless tobacco: Never Used   Substance Use Topics     Alcohol use: Not on file     Drug use: Not on file       ROS:   Answers for HPI/ROS submitted by the patient on 6/3/2019  General Symptoms: No  Skin Symptoms: No  HENT Symptoms: No  EYE SYMPTOMS: No  HEART SYMPTOMS: No  LUNG SYMPTOMS: No  INTESTINAL SYMPTOMS: No  URINARY SYMPTOMS: No  GYNECOLOGIC SYMPTOMS: No  BREAST SYMPTOMS: No  SKELETAL SYMPTOMS: No  BLOOD SYMPTOMS: No  NERVOUS SYSTEM SYMPTOMS: No  MENTAL HEALTH SYMPTOMS: No   I have reviewed the labs and made my comment in the assessment and plan.    Labs:  [facility] everywhere  CBC 2/14/2020  Hemoglobin 13.4, white blood cell count 5.5, platelet 224    Chemistry  profile:  Sodium 137, potassium 3.9, chloride 104, creatinine 0.5, GFR 82    CBC RESULTS:   Lab Results   Component Value Date    WBC 9.4 05/01/2018    RBC 3.29 (L) 05/01/2018    HGB 10.3 (L) 05/01/2018    HCT 29.2 (L) 05/01/2018    MCV 89 05/01/2018    MCH 31.3 05/01/2018    MCHC 35.3 05/01/2018    RDW 12.0 05/01/2018     05/01/2018       BMP RESULTS:  Lab Results   Component Value Date     (L) 05/01/2018    POTASSIUM 3.3 (L) 05/01/2018    CHLORIDE 93 (L) 05/01/2018    CO2 29 05/01/2018    ANIONGAP 7 05/01/2018     (H) 05/01/2018    BUN 9 05/01/2018    CR 0.58 05/01/2018    GFRESTIMATED >90 05/01/2018    GFRESTBLACK >90 05/01/2018    DEANA 8.0 (L) 05/01/2018        INR RESULTS:  Lab Results   Component Value Date    INR 1.14 06/15/2014       Echocardiogram 5/1/2018  Left ventricular function, chamber size, wall motion, and wall thickness are  normal.The EF is 60-65%. Grade II or moderate diastolic dysfunction. No  regional wall motion abnormalities are seen.  The right ventricle is normal size. Global right ventricular function is  normal.  Mild to moderate tricuspid insufficiency is present.  The inferior vena cava was dilated at 2.9 cm without respiratory variability,  consistent with increased right atrial pressure. Estimated right atrial  pressure is 10-15 mmHg.  Trivial pericardial effusion is present.     This study was compared with the study from 9/10/10 . The IVC is more dilated  consistent with increase right atrial pressures.    EKG (personally reviewed) 6/4/2018: sinus rhythm, normal AR, QRS and QTc intervals.    Zio-Patch 5/14/2018 for 11 hours: No atrial fibrillation    Assessment and Plan:   Ms. Barbi Rebolledo is a 89 year old  female with PMH significant for hypertension and paroxysmal atrial fibrillation.  She had an episode paroxysmal atrial fibrillation with RVR in May 2018 which spontaneously converted to sinus rhythm (she was found to be hypokalemic due to  hydrochlorothiazide).  She has no recurrence since then.  Patient is on rate control with diltiazem and apixaban 2.5 mg.  She has no side effects.  She is currently asymptomatic.     No medication changes today.    Return to clinic 2 years.    A total of 10 minutes spent through telephone encounter today with greater than 50% of the time spent in counseling and coordinating cares of the issues above.     Please donot hesitate to contact me if you have any questions or concerns. Again, thank you for allowing me to participate in the care of your patient.    Chandni LINARES MD  [facility] Division of Cardiology  Pager [phone]      Please do not hesitate to contact me if you have any questions/concerns.     Sincerely,     Chandni Linares MD

## 2020-08-10 NOTE — PATIENT INSTRUCTIONS
1.  Continue current medications.    2.  Follow-up in cardiology in 2 years.  You will receive a scheduling reminder in advance for that appointment.

## 2020-08-10 NOTE — PROGRESS NOTES
"Barbi Rebolledo is a 89 year old female who is being evaluated via a billable telephone visit.      The patient has been notified of following:     \"This telephone visit will be conducted via a call between you and your physician/provider. We have found that certain health care needs can be provided without the need for a physical exam.  This service lets us provide the care you need with a short phone conversation.  If a prescription is necessary we can send it directly to your pharmacy.  If lab work is needed we can place an order for that and you can then stop by our lab to have the test done at a later time.    Telephone visits are billed at different rates depending on your insurance coverage. During this emergency period, for some insurers they may be billed the same as an in-person visit.  Please reach out to your insurance provider with any questions.    If during the course of the call the physician/provider feels a telephone visit is not appropriate, you will not be charged for this service.\"    Patient has given verbal consent for Telephone visit?  Yes    How would you like to obtain your AVS? Mail a copy    Phone call duration: 10 minutes (phone call started at 3 PM)    Vitals - Patient Reported  Systolic (Patient Reported): 122  Diastolic (Patient Reported): 71  Weight (Patient Reported): 53.3 kg (117 lb 8 oz)  Height (Patient Reported): 157.5 cm (5' 2\")  BMI (Based on Pt Reported Ht/Wt): 21.49  SpO2 (Patient Reported): 96  Temperature (Patient Reported): 97.7  F (36.5  C)  Pulse (Patient Reported): 61  Pain Score: No Pain (0)(No SOB)    HPI: Ms. Barbi Rebolledo is a 88 year old  female with PMH significant for hypertension and paroxysmal atrial fibrillation.  Barbi was admitted to the hospital on 4/30 due to symptoms of fatigue and atrial fibrillation with rapid ventricular response.  In the emergency department she converted spontaneously to sinus rhythm, however the rhythm then went back to atrial " fibrillation again.  She was admitted to inpatient service for observation. She was on chlorthalidone and her potassium was 2.5 on admission.  Chlorthalidone was discontinued and potassium was replaced.  She was also on diltiazem, the dose of which was increased from 300 to 360 mg for better rate control.  She was also started on apixaban 2.5 mg twice a day. No bleeding issues with apixaban.  She is here today for one-month follow-up.  She denies any recurrence in her symptoms.  Overall she is doing very well from cardiac standpoint.The patient denies a history of chest discomfort, dyspnea, PND, orthopnea, pedal edema, palpitations, lightheadedness, and syncope  The patient's risk factor profile is: (+) HTN, (-) diabetes, (-) hyperlipidemia, (-) tobacco use, (-) family Hx CAD. No prior hx of cardiac disease.    I have reviewed her echocardiogram from 5/1/2018 which shows a structurally normal heart. I have also reviewed her ECG in clinic today which shows sinus rhythm, normal OH, QRS and QTc intervals.    Medications, personal, family, and social history reviewed with patient and revised.    Interval history 6/3/2019:  The patient returns for one-year follow-up.  She is overall doing very well from cardiac standpoint.  She is on Eliquis 2.5 mg twice daily for one episode of paroxysmal atrial fibrillation in 2018.  No recurrence since then.  Denies palpitations, dizziness, syncope, chest pain, shortness of breath.  The patient reports mild lower extremity edema likely due to diltiazem she is on for hypertension treatment.  She follows with her primary care every 6 months at [facility] and her last potassium was 4.4.  She takes hydrochlorothiazide 25 mg, diltiazem to 40 mg and potassium 20 mEq daily.  She is physically very active.  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  The patient reports overall doing well.  She continues to paint and physically active.  No recurrent atrial fibrillation since the single episode in 2018.  Patient denies palpitations, dizziness, chest pain or lower extremity edema.  Patient today tells me that she no longer takes hydrochlorothiazide.  She is on apixaban 2.5 mg twice daily, diltiazem 300 mg daily and potassium replacement.  Most recent lab test from February of this year (available in care everywhere) shows normal BMP, potassium and CBC.  Patient follows with Dr. Balderas at [facility].  Patient reports overall feeling well.      PAST MEDICAL HISTORY:  Past Medical History:   Diagnosis Date     Squamous cell carcinoma        CURRENT MEDICATIONS:  Current Outpatient Medications   Medication Sig Dispense Refill     apixaban ANTICOAGULANT (ELIQUIS) 2.5 MG tablet Take 1 tablet (2.5 mg) by mouth 2 times daily 180 tablet 0     diltiazem ER (DILT-XR) 240 MG 24 hr ER beaded capsule Take 1 capsule (240 mg) by mouth daily 90 capsule 3     emollient (VANICREAM) cream Apply topically daily to entire body, especially after bathing. 453 g 11     hydrochlorothiazide (HYDRODIURIL) 25 MG tablet TK 1 T PO QD  3     potassium chloride (KLOR-CON) 20 MEQ packet Take 20 mEq by mouth daily 90 packet 3     sodium fluoride dental gel (PREVIDENT) 1.1 % GEL Apply to affected area At Bedtime         PAST SURGICAL HISTORY:  Past Surgical History:   Procedure Laterality Date     MOHS MICROGRAPHIC PROCEDURE         ALLERGIES:     Allergies   Allergen Reactions     Fentanyl Other (See Comments)     Sedation     Lisinopril Swelling     Swelling of tongue     Novocain [Procaine] Other (See Comments)     Tachycardia       FAMILY HISTORY:  Family History   Problem Relation Age of Onset     Cancer No family hx of         no skin cancer     Skin Cancer No family hx of         no skin cancer         SOCIAL HISTORY:  Social History     Tobacco Use     Smoking  status: Never Smoker     Smokeless tobacco: Never Used   Substance Use Topics     Alcohol use: Not on file     Drug use: Not on file       ROS:   Answers for HPI/ROS submitted by the patient on 6/3/2019  General Symptoms: No  Skin Symptoms: No  HENT Symptoms: No  EYE SYMPTOMS: No  HEART SYMPTOMS: No  LUNG SYMPTOMS: No  INTESTINAL SYMPTOMS: No  URINARY SYMPTOMS: No  GYNECOLOGIC SYMPTOMS: No  BREAST SYMPTOMS: No  SKELETAL SYMPTOMS: No  BLOOD SYMPTOMS: No  NERVOUS SYSTEM SYMPTOMS: No  MENTAL HEALTH SYMPTOMS: No   I have reviewed the labs and made my comment in the assessment and plan.    Labs:  [facility] everywhere  CBC 2/14/2020  Hemoglobin 13.4, white blood cell count 5.5, platelet 224    Chemistry profile:  Sodium 137, potassium 3.9, chloride 104, creatinine 0.5, GFR 82    CBC RESULTS:   Lab Results   Component Value Date    WBC 9.4 05/01/2018    RBC 3.29 (L) 05/01/2018    HGB 10.3 (L) 05/01/2018    HCT 29.2 (L) 05/01/2018    MCV 89 05/01/2018    MCH 31.3 05/01/2018    MCHC 35.3 05/01/2018    RDW 12.0 05/01/2018     05/01/2018       BMP RESULTS:  Lab Results   Component Value Date     (L) 05/01/2018    POTASSIUM 3.3 (L) 05/01/2018    CHLORIDE 93 (L) 05/01/2018    CO2 29 05/01/2018    ANIONGAP 7 05/01/2018     (H) 05/01/2018    BUN 9 05/01/2018    CR 0.58 05/01/2018    GFRESTIMATED >90 05/01/2018    GFRESTBLACK >90 05/01/2018    DEANA 8.0 (L) 05/01/2018        INR RESULTS:  Lab Results   Component Value Date    INR 1.14 06/15/2014       Echocardiogram 5/1/2018  Left ventricular function, chamber size, wall motion, and wall thickness are  normal.The EF is 60-65%. Grade II or moderate diastolic dysfunction. No  regional wall motion abnormalities are seen.  The right ventricle is normal size. Global right ventricular function is  normal.  Mild to moderate tricuspid insufficiency is present.  The inferior vena cava was dilated at 2.9 cm without respiratory variability,  consistent with increased right  atrial pressure. Estimated right atrial  pressure is 10-15 mmHg.  Trivial pericardial effusion is present.     This study was compared with the study from 9/10/10 . The IVC is more dilated  consistent with increase right atrial pressures.    EKG (personally reviewed) 6/4/2018: sinus rhythm, normal MI, QRS and QTc intervals.    Zio-Patch 5/14/2018 for 11 hours: No atrial fibrillation    Assessment and Plan:   Ms. Barbi Rebolledo is a 89 year old  female with PMH significant for hypertension and paroxysmal atrial fibrillation.  She had an episode paroxysmal atrial fibrillation with RVR in May 2018 which spontaneously converted to sinus rhythm (she was found to be hypokalemic due to hydrochlorothiazide).  She has no recurrence since then.  Patient is on rate control with diltiazem and apixaban 2.5 mg.  She has no side effects.  She is currently asymptomatic.     No medication changes today.    Return to clinic 2 years.    A total of 10 minutes spent through telephone encounter today with greater than 50% of the time spent in counseling and coordinating cares of the issues above.     Please donot hesitate to contact me if you have any questions or concerns. Again, thank you for allowing me to participate in the care of your patient.    Chandni LINARES MD  [facility] Division of Cardiology  Pager [phone]

## 2021-07-21 ENCOUNTER — LAB REQUISITION (OUTPATIENT)
Dept: LAB | Facility: CLINIC | Age: 86
End: 2021-07-21
Payer: MEDICARE

## 2021-07-21 DIAGNOSIS — R53.83 OTHER FATIGUE: ICD-10-CM

## 2021-07-21 LAB
T4 SERPL-MCNC: 9.9 UG/DL (ref 4.5–13.9)
TSH SERPL DL<=0.005 MIU/L-ACNC: 1.4 MU/L (ref 0.4–4)

## 2021-07-21 PROCEDURE — 36415 COLL VENOUS BLD VENIPUNCTURE: CPT | Mod: ORL | Performed by: INTERNAL MEDICINE

## 2021-07-21 PROCEDURE — 84436 ASSAY OF TOTAL THYROXINE: CPT | Mod: ORL | Performed by: INTERNAL MEDICINE

## 2021-07-21 PROCEDURE — 84443 ASSAY THYROID STIM HORMONE: CPT | Mod: ORL | Performed by: INTERNAL MEDICINE

## 2021-10-04 ENCOUNTER — HOSPITAL ENCOUNTER (EMERGENCY)
Facility: CLINIC | Age: 86
Discharge: HOME | End: 2021-10-04
Attending: EMERGENCY MEDICINE | Admitting: EMERGENCY MEDICINE
Payer: MEDICARE

## 2021-10-04 VITALS
OXYGEN SATURATION: 97 % | HEIGHT: 62 IN | TEMPERATURE: 97.5 F | HEART RATE: 72 BPM | DIASTOLIC BLOOD PRESSURE: 82 MMHG | BODY MASS INDEX: 21.53 KG/M2 | RESPIRATION RATE: 18 BRPM | SYSTOLIC BLOOD PRESSURE: 153 MMHG | WEIGHT: 117 LBS

## 2021-10-04 DIAGNOSIS — I48.91 ATRIAL FIBRILLATION WITH RVR (H): ICD-10-CM

## 2021-10-04 LAB
ANION GAP SERPL CALCULATED.3IONS-SCNC: 5 MMOL/L (ref 3–14)
BASOPHILS # BLD AUTO: 0 10E3/UL (ref 0–0.2)
BASOPHILS NFR BLD AUTO: 0 %
BUN SERPL-MCNC: 12 MG/DL (ref 7–30)
CALCIUM SERPL-MCNC: 9.1 MG/DL (ref 8.5–10.1)
CHLORIDE BLD-SCNC: 104 MMOL/L (ref 94–109)
CO2 SERPL-SCNC: 29 MMOL/L (ref 20–32)
CREAT SERPL-MCNC: 0.61 MG/DL (ref 0.52–1.04)
EOSINOPHIL # BLD AUTO: 0.1 10E3/UL (ref 0–0.7)
EOSINOPHIL NFR BLD AUTO: 2 %
ERYTHROCYTE [DISTWIDTH] IN BLOOD BY AUTOMATED COUNT: 12.8 % (ref 10–15)
GFR SERPL CREATININE-BSD FRML MDRD: 80 ML/MIN/1.73M2
GLUCOSE BLD-MCNC: 95 MG/DL (ref 70–99)
HCT VFR BLD AUTO: 42.9 % (ref 35–47)
HGB BLD-MCNC: 14.4 G/DL (ref 11.7–15.7)
HOLD SPECIMEN: NORMAL
IMM GRANULOCYTES # BLD: 0 10E3/UL
IMM GRANULOCYTES NFR BLD: 0 %
LYMPHOCYTES # BLD AUTO: 0.7 10E3/UL (ref 0.8–5.3)
LYMPHOCYTES NFR BLD AUTO: 10 %
MCH RBC QN AUTO: 30.9 PG (ref 26.5–33)
MCHC RBC AUTO-ENTMCNC: 33.6 G/DL (ref 31.5–36.5)
MCV RBC AUTO: 92 FL (ref 78–100)
MONOCYTES # BLD AUTO: 0.5 10E3/UL (ref 0–1.3)
MONOCYTES NFR BLD AUTO: 7 %
NEUTROPHILS # BLD AUTO: 5.3 10E3/UL (ref 1.6–8.3)
NEUTROPHILS NFR BLD AUTO: 81 %
NRBC # BLD AUTO: 0 10E3/UL
NRBC BLD AUTO-RTO: 0 /100
PLATELET # BLD AUTO: 270 10E3/UL (ref 150–450)
POTASSIUM BLD-SCNC: 4.1 MMOL/L (ref 3.4–5.3)
RBC # BLD AUTO: 4.66 10E6/UL (ref 3.8–5.2)
SODIUM SERPL-SCNC: 138 MMOL/L (ref 133–144)
TSH SERPL DL<=0.005 MIU/L-ACNC: 1.26 MU/L (ref 0.4–4)
WBC # BLD AUTO: 6.6 10E3/UL (ref 4–11)

## 2021-10-04 PROCEDURE — 250N000013 HC RX MED GY IP 250 OP 250 PS 637: Performed by: EMERGENCY MEDICINE

## 2021-10-04 PROCEDURE — 93005 ELECTROCARDIOGRAM TRACING: CPT | Mod: 76

## 2021-10-04 PROCEDURE — 84443 ASSAY THYROID STIM HORMONE: CPT | Performed by: EMERGENCY MEDICINE

## 2021-10-04 PROCEDURE — 93010 ELECTROCARDIOGRAM REPORT: CPT | Mod: 76 | Performed by: EMERGENCY MEDICINE

## 2021-10-04 PROCEDURE — 36415 COLL VENOUS BLD VENIPUNCTURE: CPT | Performed by: EMERGENCY MEDICINE

## 2021-10-04 PROCEDURE — 93010 ELECTROCARDIOGRAM REPORT: CPT | Performed by: EMERGENCY MEDICINE

## 2021-10-04 PROCEDURE — 85025 COMPLETE CBC W/AUTO DIFF WBC: CPT | Performed by: EMERGENCY MEDICINE

## 2021-10-04 PROCEDURE — 99284 EMERGENCY DEPT VISIT MOD MDM: CPT | Mod: 25 | Performed by: EMERGENCY MEDICINE

## 2021-10-04 PROCEDURE — 99284 EMERGENCY DEPT VISIT MOD MDM: CPT | Mod: 25

## 2021-10-04 PROCEDURE — 93005 ELECTROCARDIOGRAM TRACING: CPT

## 2021-10-04 PROCEDURE — 80048 BASIC METABOLIC PNL TOTAL CA: CPT | Performed by: EMERGENCY MEDICINE

## 2021-10-04 RX ORDER — DILTIAZEM HYDROCHLORIDE 180 MG/1
180 CAPSULE, EXTENDED RELEASE ORAL DAILY
Qty: 30 CAPSULE | Refills: 0 | Status: SHIPPED | OUTPATIENT
Start: 2021-10-04 | End: 2021-10-18

## 2021-10-04 RX ORDER — DILTIAZEM HYDROCHLORIDE 180 MG/1
180 CAPSULE, COATED, EXTENDED RELEASE ORAL DAILY
Status: DISCONTINUED | OUTPATIENT
Start: 2021-10-04 | End: 2021-10-04 | Stop reason: HOSPADM

## 2021-10-04 RX ADMIN — DILTIAZEM HYDROCHLORIDE 180 MG: 180 CAPSULE, COATED, EXTENDED RELEASE ORAL at 11:00

## 2021-10-04 ASSESSMENT — MIFFLIN-ST. JEOR: SCORE: 898.96

## 2021-10-04 NOTE — ED PROVIDER NOTES
ED Provider Note  [facility]      History     Chief Complaint   Patient presents with     Palpitations     Dizziness     HPI  Barbi Rebolledo is a 89 year old female with a history of paroxysmal afib on apixaban, HTN who presents with lightheadedness since this morning, especially when she is standing or walking. She reports that she woke up and felt weak and as if she may faint. She did not lose consciousness or fall. She checked her blood pressure which was 130s/90s and her pulses which was ~80. She rechecked her pulse with her son and the machine read irregular. Her son noted that her pulse was fast and irregular as well, and they came to the ED.    Currently, she is feeling well. No fevers, chills, headache, vision changes, chest pain, shortness of breath, nausea, vomiting, abdominal pain, dysuria. No muscle aches or pains.     Of note, she had been on diltiazem 300mg daily, but that was decreased to 180mg daily in July 2021 because she was having blood pressures in the 100s-120s/60s (which are lower for her) and was feeling more tired. The diltiazem was discontinued about a month ago because of the same reasons.    For afib, she reports that she was in the hospital for afib with RVR back in 2018 and she converted spontaneously to NSR at that time after receiving fluids and potassium (she was hypokalemic at the time). She has not been known to be in afib since then. Has not had cardioversion. Takes her apixaban consistently.     Of note, she checks her blood pressure and heart rates daily, and heart rates are always below 100s, usually in the 80s.    Past Medical History  Past Medical History:   Diagnosis Date     Squamous cell carcinoma      Past Surgical History:   Procedure Laterality Date     MOHS MICROGRAPHIC PROCEDURE       diltiazem ER (DILT-XR) 180 MG 24 hr capsule  apixaban ANTICOAGULANT (ELIQUIS) 2.5 MG tablet  emollient (VANICREAM) cream  potassium chloride (KLOR-CON) 20  "MEQ packet  sodium fluoride dental gel (PREVIDENT) 1.1 % GEL      Allergies   Allergen Reactions     Fentanyl Other (See Comments)     Sedation     Lisinopril Swelling     Swelling of tongue     Novocain [Procaine] Other (See Comments)     Tachycardia     Family History  Family History   Problem Relation Age of Onset     Cancer No family hx of         no skin cancer     Skin Cancer No family hx of         no skin cancer     Social History   Social History     Tobacco Use     Smoking status: Never Smoker     Smokeless tobacco: Never Used   Substance Use Topics     Alcohol use: None     Drug use: None      Past medical history, past surgical history, medications, allergies, family history, and social history were reviewed with the patient. No additional pertinent items.       Review of Systems   Constitutional: Negative for fever.   Respiratory: Negative for shortness of breath.    Cardiovascular: Positive for palpitations. Negative for chest pain.   Gastrointestinal: Negative for abdominal pain.   Neurological: Positive for dizziness.   All other systems reviewed and are negative.    A complete review of systems was performed with pertinent positives and negatives noted in the HPI, and all other systems negative.    Physical Exam   BP: (!) 205/138  Pulse: 120 (irregular:  )  Temp: 97.5  F (36.4  C)  Resp: 18  Height: 157.5 cm (5' 2\")  Weight: 53.1 kg (117 lb)  SpO2: 100 %  Physical Exam  General: lying in bed, NAD  HEENT: no scleral icterus or conjunctival injection, oropharynx clear  Cardiac: tachycardic rate, irregular rhythm, no murmurs  Lungs: clear to auscultation bilaterally, no crackles or wheezes  Abdomen: soft, non-tender, non-distended. No rebound or guarding.   Extremities: trace edema in lower extremities bilaterally  Neuro: alert, oriented x4. CN II-XII intact. Moving all 4 extremities spontaneously.    ED Course      Procedures            EKG Interpretation:      Interpreted by Jacek Marks, " MD  Time reviewed: 1000 on 10/4/2021  Symptoms at time of EKG: lightheadedness  Rhythm: afib  Rate: tachycardic  Axis: normal  Ectopy: none  Conduction: normal  ST Segments/ T Waves: No ST-T wave changes  Q Waves: none  Comparison to prior: prior EKGs showing NSR    Clinical Impression: atrial fibrillation with RVR      Interpreted by Jacek Marks MD  Time reviewed: 1230 on 10/4/2021  Symptoms at time of EKG: lightheadedness  Rhythm: sinus arrhythmia   Rate: normal  Axis: normal  Ectopy: none  Conduction: normal  ST Segments/ T Waves: No ST-T wave changes  Q Waves: none  Comparison to prior: now out of afib    Clinical Impression: sinus arrhythmia       Results for orders placed or performed during the hospital encounter of 10/04/21   Basic metabolic panel     Status: Normal   Result Value Ref Range    Sodium 138 133 - 144 mmol/L    Potassium 4.1 3.4 - 5.3 mmol/L    Chloride 104 94 - 109 mmol/L    Carbon Dioxide (CO2) 29 20 - 32 mmol/L    Anion Gap 5 3 - 14 mmol/L    Urea Nitrogen 12 7 - 30 mg/dL    Creatinine 0.61 0.52 - 1.04 mg/dL    Calcium 9.1 8.5 - 10.1 mg/dL    Glucose 95 70 - 99 mg/dL    GFR Estimate 80 >60 mL/min/1.73m2   TSH with free T4 reflex     Status: Normal   Result Value Ref Range    TSH 1.26 0.40 - 4.00 mU/L   CBC with platelets and differential     Status: Abnormal   Result Value Ref Range    WBC Count 6.6 4.0 - 11.0 10e3/uL    RBC Count 4.66 3.80 - 5.20 10e6/uL    Hemoglobin 14.4 11.7 - 15.7 g/dL    Hematocrit 42.9 35.0 - 47.0 %    MCV 92 78 - 100 fL    MCH 30.9 26.5 - 33.0 pg    MCHC 33.6 31.5 - 36.5 g/dL    RDW 12.8 10.0 - 15.0 %    Platelet Count 270 150 - 450 10e3/uL    % Neutrophils 81 %    % Lymphocytes 10 %    % Monocytes 7 %    % Eosinophils 2 %    % Basophils 0 %    % Immature Granulocytes 0 %    NRBCs per 100 WBC 0 <1 /100    Absolute Neutrophils 5.3 1.6 - 8.3 10e3/uL    Absolute Lymphocytes 0.7 (L) 0.8 - 5.3 10e3/uL    Absolute Monocytes 0.5 0.0 - 1.3 10e3/uL    Absolute Eosinophils  0.1 0.0 - 0.7 10e3/uL    Absolute Basophils 0.0 0.0 - 0.2 10e3/uL    Absolute Immature Granulocytes 0.0 <=0.0 10e3/uL    Absolute NRBCs 0.0 10e3/uL   Extra Blue Top Tube     Status: None   Result Value Ref Range    Hold Specimen JIC    Extra Red Top Tube     Status: None   Result Value Ref Range    Hold Specimen JIC    Extra Green Top (Lithium Heparin) Tube     Status: None   Result Value Ref Range    Hold Specimen JIC    Extra Purple Top Tube     Status: None   Result Value Ref Range    Hold Specimen JIC    EKG 12-lead, tracing only     Status: None   Result Value Ref Range    Systolic Blood Pressure  mmHg    Diastolic Blood Pressure  mmHg    Ventricular Rate 148 BPM    Atrial Rate 163 BPM    CT Interval  ms    QRS Duration 76 ms     ms    QTc 436 ms    P Axis  degrees    R AXIS -2 degrees    T Axis 36 degrees    Interpretation ECG       Atrial fibrillation with rapid ventricular response  Nonspecific ST and T wave abnormality  Abnormal ECG  Unconfirmed report - interpretation of this ECG is computer generated - see medical record for final interpretation    Confirmed by - EMERGENCY ROOM, PHYSICIAN ([id number]),  KYLE BLEVINS ([id number]) on 10/5/2021 7:29:17 AM     EKG 12-lead, tracing only     Status: None   Result Value Ref Range    Systolic Blood Pressure  mmHg    Diastolic Blood Pressure  mmHg    Ventricular Rate 79 BPM    Atrial Rate 79 BPM    CT Interval 152 ms    QRS Duration 72 ms     ms    QTc 444 ms    P Axis 74 degrees    R AXIS -10 degrees    T Axis 42 degrees    Interpretation ECG       Sinus rhythm with marked sinus arrhythmia  Inferior infarct , age undetermined  Abnormal ECG     CBC with platelets differential     Status: Abnormal    Narrative    The following orders were created for panel order CBC with platelets differential.  Procedure                               Abnormality         Status                     ---------                               -----------         ------                      CBC with platelets and d...[[id number]]  Abnormal            Final result                 Please view results for these tests on the individual orders.   [place] Draw     Status: None    Narrative    The following orders were created for panel order [place] Draw.  Procedure                               Abnormality         Status                     ---------                               -----------         ------                     Extra Blue Top Tube[[id number]]                              Final result               Extra Red Top Tube[[id number]]                               Final result               Extra Green Top (Lithium...[[id number]]                      Final result               Extra Purple Top Tube[[id number]]                            Final result                 Please view results for these tests on the individual orders.            Results for orders placed or performed during the hospital encounter of 10/04/21   Basic metabolic panel     Status: Normal   Result Value Ref Range    Sodium 138 133 - 144 mmol/L    Potassium 4.1 3.4 - 5.3 mmol/L    Chloride 104 94 - 109 mmol/L    Carbon Dioxide (CO2) 29 20 - 32 mmol/L    Anion Gap 5 3 - 14 mmol/L    Urea Nitrogen 12 7 - 30 mg/dL    Creatinine 0.61 0.52 - 1.04 mg/dL    Calcium 9.1 8.5 - 10.1 mg/dL    Glucose 95 70 - 99 mg/dL    GFR Estimate 80 >60 mL/min/1.73m2   TSH with free T4 reflex     Status: Normal   Result Value Ref Range    TSH 1.26 0.40 - 4.00 mU/L   CBC with platelets and differential     Status: Abnormal   Result Value Ref Range    WBC Count 6.6 4.0 - 11.0 10e3/uL    RBC Count 4.66 3.80 - 5.20 10e6/uL    Hemoglobin 14.4 11.7 - 15.7 g/dL    Hematocrit 42.9 35.0 - 47.0 %    MCV 92 78 - 100 fL    MCH 30.9 26.5 - 33.0 pg    MCHC 33.6 31.5 - 36.5 g/dL    RDW 12.8 10.0 - 15.0 %    Platelet Count 270 150 - 450 10e3/uL    % Neutrophils 81 %    % Lymphocytes 10 %    % Monocytes 7 %    % Eosinophils 2 %    % Basophils 0 %    % Immature  Granulocytes 0 %    NRBCs per 100 WBC 0 <1 /100    Absolute Neutrophils 5.3 1.6 - 8.3 10e3/uL    Absolute Lymphocytes 0.7 (L) 0.8 - 5.3 10e3/uL    Absolute Monocytes 0.5 0.0 - 1.3 10e3/uL    Absolute Eosinophils 0.1 0.0 - 0.7 10e3/uL    Absolute Basophils 0.0 0.0 - 0.2 10e3/uL    Absolute Immature Granulocytes 0.0 <=0.0 10e3/uL    Absolute NRBCs 0.0 10e3/uL   Extra Blue Top Tube     Status: None   Result Value Ref Range    Hold Specimen JIC    Extra Red Top Tube     Status: None   Result Value Ref Range    Hold Specimen JIC    Extra Green Top (Lithium Heparin) Tube     Status: None   Result Value Ref Range    Hold Specimen JIC    Extra Purple Top Tube     Status: None   Result Value Ref Range    Hold Specimen JIC    EKG 12-lead, tracing only     Status: None   Result Value Ref Range    Systolic Blood Pressure  mmHg    Diastolic Blood Pressure  mmHg    Ventricular Rate 148 BPM    Atrial Rate 163 BPM    NV Interval  ms    QRS Duration 76 ms     ms    QTc 436 ms    P Axis  degrees    R AXIS -2 degrees    T Axis 36 degrees    Interpretation ECG       Atrial fibrillation with rapid ventricular response  Nonspecific ST and T wave abnormality  Abnormal ECG  Unconfirmed report - interpretation of this ECG is computer generated - see medical record for final interpretation    Confirmed by - EMERGENCY ROOM, PHYSICIAN ([id number]),  KYLE BLEVINS ([id number]) on 10/5/2021 7:29:17 AM     EKG 12-lead, tracing only     Status: None   Result Value Ref Range    Systolic Blood Pressure  mmHg    Diastolic Blood Pressure  mmHg    Ventricular Rate 79 BPM    Atrial Rate 79 BPM    NV Interval 152 ms    QRS Duration 72 ms     ms    QTc 444 ms    P Axis 74 degrees    R AXIS -10 degrees    T Axis 42 degrees    Interpretation ECG       Sinus rhythm with marked sinus arrhythmia  Inferior infarct , age undetermined  Abnormal ECG     CBC with platelets differential     Status: Abnormal    Narrative    The following orders were created  for panel order CBC with platelets differential.  Procedure                               Abnormality         Status                     ---------                               -----------         ------                     CBC with platelets and d...[[id number]]  Abnormal            Final result                 Please view results for these tests on the individual orders.   [place] Draw     Status: None    Narrative    The following orders were created for panel order [place] Draw.  Procedure                               Abnormality         Status                     ---------                               -----------         ------                     Extra Blue Top Tube[[id number]]                              Final result               Extra Red Top Tube[[id number]]                               Final result               Extra Green Top (Lithium...[[id number]]                      Final result               Extra Purple Top Tube[[id number]]                            Final result                 Please view results for these tests on the individual orders.     Medications - No data to display     Assessments & Plan (with Medical Decision Making)   Barbi Rebolledo is a 89 year old female with a history of paroxysmal afib on apixaban, HTN who presents with lightheadedness since this morning. Differential includes atrial fibrillation with RVR, infection, ACS, electrolyte abnormality, acute neuro event, vasovagal, orthostasis. Found to be in afib with RVR on presentation to the ED with elevated blood pressure to 200s/130s, likely the cause of the lightheadedness .No chest pain or EKG changes to suggest ACS. No focal neuro deficits to suggest stroke.     As for the afib, no history of thyroid disease, drinks a glass of wine per day, no evidence to point towards ACS. Afib with RVR likely precipitated by discontinuation of diltiazem. Given that she is feeling well and stable, will give her oral diltiazem and see how she  responds.     Plan:   - labs  - EKG  - diltiazem 180mg po once    1257-  Had a repeat EKG that showed spontaneous conversion from afib back to NSR. Blood pressures stable in the systolics 140s-150s after receiving diltiazem po while lying, sitting, and standing. Electrolytes stable. Feeling well. Plan to discharge with Cardiology or PCP follow-up in 1 week.     I have reviewed the nursing notes. I have reviewed the findings, diagnosis, plan and need for follow up with the patient.    Discharge Medication List as of 10/4/2021  1:11 PM      START taking these medications    Details   diltiazem ER (DILT-XR) 180 MG 24 hr capsule Take 1 capsule (180 mg) by mouth daily, Disp-30 capsule, R-0, Local PrintStart taking diltiazem 180mg daily starting 10/5/2021. She already has some at home and will follow-up with Cardiology or PCP within a week             Final diagnoses:   Atrial fibrillation with RVR (H)       --  Jacek Marks MD  Internal Medicine PGY1    Esau Maciel  [facility] EMERGENCY DEPARTMENT  10/4/2021      --    ED Attending Physician Attestation    I Esau Maciel DO, cared for this patient with the Resident. I have performed a history and physical examination of the patient and discussed management with the resident. I reviewed the resident's documentation above and agree with the documented findings and plan of care.    Summary of HPI, PE, ED Course   Patient is a 89 year old female evaluated in the emergency department for palpitations, dizziness. Exam notable for irregularly irregular tachycardic heart rate. ED course notable for initial A. fib with RVR as well as severe hypertension. Oral diltiazem was given. Blood pressure improved dramatically and rhythm converted to normal sinus rhythm. After the completion of care in the emergency department, the patient was discharged patient symptoms completely resolved after conversion of her abnormal heart rhythm. Oral diltiazem was  reinstituted and recommended that she follow-up with cardiology.    Critical Care & Procedures  Not applicable.    Medical Decision Making  The medical record was reviewed and interpreted.  Current labs reviewed and interpreted.  EKG reviewed and interpreted:  .      Esau Maciel DO  Emergency Medicine          Esau Maciel DO  10/05/21 1118

## 2021-10-04 NOTE — DISCHARGE INSTRUCTIONS
You were seen in the ED today because you were feeling lightheaded. You were found to be in atrial fibrillation with elevated heart rate. You spontaneously went back into a normal heart rhythm while in the ED. See the plan below:  - take diltiazem 180mg daily starting 10/5/2021  - follow-up with your cardiologist in 1 week. If you are not able to make an appointment with your cardiologist, please make an appointment with your primary care provider.   - please return to the ED if you develop any symptoms concerning to you which could include chest pains, falls, loss of consciousness, persistent lightheadedness

## 2021-10-04 NOTE — ED TRIAGE NOTES
89YR F - presenting to ED for eval of palpitations, dizziness; since overnight.  Recently had her B/P med's suspended in Sept. By her MD.  B/P at Triage:  170/142, 205/138.  HR appears to be irregular, as well:  's (BPM).  Airway patent.

## 2021-10-05 LAB
ATRIAL RATE - MUSE: 163 BPM
ATRIAL RATE - MUSE: 79 BPM
DIASTOLIC BLOOD PRESSURE - MUSE: NORMAL MMHG
DIASTOLIC BLOOD PRESSURE - MUSE: NORMAL MMHG
INTERPRETATION ECG - MUSE: NORMAL
INTERPRETATION ECG - MUSE: NORMAL
P AXIS - MUSE: 74 DEGREES
P AXIS - MUSE: NORMAL DEGREES
PR INTERVAL - MUSE: 152 MS
PR INTERVAL - MUSE: NORMAL MS
QRS DURATION - MUSE: 72 MS
QRS DURATION - MUSE: 76 MS
QT - MUSE: 278 MS
QT - MUSE: 388 MS
QTC - MUSE: 436 MS
QTC - MUSE: 444 MS
R AXIS - MUSE: -10 DEGREES
R AXIS - MUSE: -2 DEGREES
SYSTOLIC BLOOD PRESSURE - MUSE: NORMAL MMHG
SYSTOLIC BLOOD PRESSURE - MUSE: NORMAL MMHG
T AXIS - MUSE: 36 DEGREES
T AXIS - MUSE: 42 DEGREES
VENTRICULAR RATE- MUSE: 148 BPM
VENTRICULAR RATE- MUSE: 79 BPM

## 2021-10-05 ASSESSMENT — ENCOUNTER SYMPTOMS
FEVER: 0
SHORTNESS OF BREATH: 0
PALPITATIONS: 1
DIZZINESS: 1
ABDOMINAL PAIN: 0

## 2021-10-18 ENCOUNTER — OFFICE VISIT (OUTPATIENT)
Dept: CARDIOLOGY | Facility: CLINIC | Age: 86
End: 2021-10-18
Attending: INTERNAL MEDICINE
Payer: MEDICARE

## 2021-10-18 VITALS
WEIGHT: 113.2 LBS | HEIGHT: 61 IN | BODY MASS INDEX: 21.37 KG/M2 | HEART RATE: 79 BPM | OXYGEN SATURATION: 95 % | DIASTOLIC BLOOD PRESSURE: 78 MMHG | SYSTOLIC BLOOD PRESSURE: 167 MMHG

## 2021-10-18 DIAGNOSIS — I10 BENIGN ESSENTIAL HYPERTENSION: ICD-10-CM

## 2021-10-18 DIAGNOSIS — I48.0 PAROXYSMAL ATRIAL FIBRILLATION (H): Primary | ICD-10-CM

## 2021-10-18 PROCEDURE — G0463 HOSPITAL OUTPT CLINIC VISIT: HCPCS | Mod: 25

## 2021-10-18 PROCEDURE — 99215 OFFICE O/P EST HI 40 MIN: CPT | Performed by: INTERNAL MEDICINE

## 2021-10-18 PROCEDURE — 93005 ELECTROCARDIOGRAM TRACING: CPT

## 2021-10-18 RX ORDER — DILTIAZEM HYDROCHLORIDE 180 MG/1
180 CAPSULE, EXTENDED RELEASE ORAL DAILY
Qty: 90 CAPSULE | Refills: 3 | Status: ON HOLD | OUTPATIENT
Start: 2021-10-18 | End: 2023-03-28

## 2021-10-18 ASSESSMENT — MIFFLIN-ST. JEOR: SCORE: 860.59

## 2021-10-18 ASSESSMENT — PAIN SCALES - GENERAL: PAINLEVEL: NO PAIN (0)

## 2021-10-18 NOTE — PATIENT INSTRUCTIONS
Continue diltiazem 180 mg daily - RX sent to pharmacy.    Complete an echocardiogram.    Follow up in 1 year. You will receive a scheduling reminder in advance.

## 2021-10-18 NOTE — PROGRESS NOTES
HPI: Ms. Barbi Rebolledo is a 88 year old  female with PMH significant for hypertension and paroxysmal atrial fibrillation.  Barbi was admitted to the hospital on 4/30 due to symptoms of fatigue and atrial fibrillation with rapid ventricular response.  In the emergency department she converted spontaneously to sinus rhythm, however the rhythm then went back to atrial fibrillation again.  She was admitted to inpatient service for observation. She was on chlorthalidone and her potassium was 2.5 on admission.  Chlorthalidone was discontinued and potassium was replaced.  She was also on diltiazem, the dose of which was increased from 300 to 360 mg for better rate control.  She was also started on apixaban 2.5 mg twice a day. No bleeding issues with apixaban.  She is here today for one-month follow-up.  She denies any recurrence in her symptoms.  Overall she is doing very well from cardiac standpoint.The patient denies a history of chest discomfort, dyspnea, PND, orthopnea, pedal edema, palpitations, lightheadedness, and syncope  The patient's risk factor profile is: (+) HTN, (-) diabetes, (-) hyperlipidemia, (-) tobacco use, (-) family Hx CAD. No prior hx of cardiac disease.    I have reviewed her echocardiogram from 5/1/2018 which shows a structurally normal heart. I have also reviewed her ECG in clinic today which shows sinus rhythm, normal NV, QRS and QTc intervals.    Medications, personal, family, and social history reviewed with patient and revised.    Interval history 6/3/2019:  The patient returns for one-year follow-up.  She is overall doing very well from cardiac standpoint.  She is on Eliquis 2.5 mg twice daily for one episode of paroxysmal atrial fibrillation in 2018.  No recurrence since then.  Denies palpitations, dizziness, syncope, chest pain, shortness of breath.  The patient reports mild lower extremity edema likely due to diltiazem she is on for hypertension treatment.  She follows with her  primary care every 6 months at [facility] and her last potassium was 4.4.  She takes hydrochlorothiazide 25 mg, diltiazem to 40 mg and potassium 20 mEq daily.  She is physically very active.  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  The patient reports overall doing well.  She continues to paint and physically active.  No recurrent atrial fibrillation since the single episode in 2018.  Patient denies palpitations, dizziness, chest pain or lower extremity edema.  Patient today tells me that she no longer takes hydrochlorothiazide.  She is on apixaban 2.5 mg twice daily, diltiazem 300 mg daily and potassium replacement.  Most recent lab test from February of this year (available in care everywhere) shows normal BMP, potassium and CBC.  Patient follows with Dr. Balderas at [facility].  Patient reports overall feeling well.    Interval history 10/18/2021  Patient presented to ER on 10/4/2021 with atrial fibrillation with RVR.  Her major symptom was lightheadedness on that day.  Blood pressure was elevated at 200/130 mmHg.  Patient was off of diltiazem for a month at the time of admission (patient tells me that her primary care physician discontinued the medication).  She was given IV diltiazem which spontaneously converted her to sinus rhythm.  Labs including CBC, TSH, and BMP were normal. Patient was prescribed diltiazem 180 mg on discharge.    Patient is being seen today for follow-up.  She is overall feeling well.  Denies palpitations, dizziness, syncope, lower extremity edema, chest pain or shortness of breath.  She brought a log of blood pressure measurements at home which shows normal blood pressure at home.  Patient has been taking apixaban 2.5 mg twice daily without any interruption.  EKG in clinic shows sinus rhythm.    PAST MEDICAL HISTORY:  Past  "Medical History:   Diagnosis Date     Squamous cell carcinoma        CURRENT MEDICATIONS:  Current Outpatient Medications   Medication Sig Dispense Refill     apixaban ANTICOAGULANT (ELIQUIS) 2.5 MG tablet Take 1 tablet (2.5 mg) by mouth 2 times daily 180 tablet 0     diltiazem ER (DILT-XR) 180 MG 24 hr capsule Take 1 capsule (180 mg) by mouth daily 30 capsule 0     emollient (VANICREAM) cream Apply topically daily to entire body, especially after bathing. (Patient not taking: Reported on 8/10/2020) 453 g 11     potassium chloride (KLOR-CON) 20 MEQ packet Take 20 mEq by mouth daily 90 packet 3     sodium fluoride dental gel (PREVIDENT) 1.1 % GEL Apply to affected area At Bedtime         PAST SURGICAL HISTORY:  Past Surgical History:   Procedure Laterality Date     MOHS MICROGRAPHIC PROCEDURE         ALLERGIES:     Allergies   Allergen Reactions     Fentanyl Other (See Comments)     Sedation     Lisinopril Swelling     Swelling of tongue     Novocain [Procaine] Other (See Comments)     Tachycardia       FAMILY HISTORY:  Family History   Problem Relation Age of Onset     Cancer No family hx of         no skin cancer     Skin Cancer No family hx of         no skin cancer         SOCIAL HISTORY:  Social History     Tobacco Use     Smoking status: Never Smoker     Smokeless tobacco: Never Used   Substance Use Topics     Alcohol use: Not on file     Drug use: Not on file       Exam:  BP (!) 167/78 (BP Location: Right arm, Patient Position: Chair, Cuff Size: Adult Small)   Pulse 79   Ht 1.541 m (5' 0.67\")   Wt 51.3 kg (113 lb 3.2 oz)   SpO2 95%   BMI 21.62 kg/m    GENERAL APPEARANCE: alert and no distress  HEENT: no icterus, no central cyanosis  LYMPH/NECK: no adenopathy, no asymmetry, JVP not elevated.  RESPIRATORY: lungs clear to auscultation - no rales, rhonchi or wheezes, no use of accessory muscles, no retractions, respirations are unlabored, normal respiratory rate  CARDIOVASCULAR: regular rhythm, normal S1, S2, " no S3 or S4 and no murmur, click or rub, precordium quiet with normal PMI.  GI: soft, non tender  EXTREMITIES: peripheral pulses normal, no edema  NEURO: alert, normal speech,and affect  SKIN: no ecchymoses, no rashes     I have reviewed the labs and personally reviewed the imaging below and made my comment in the assessment and plan.    Labs:  CBC RESULTS:   Lab Results   Component Value Date    WBC 6.6 10/04/2021    WBC 9.4 05/01/2018    RBC 4.66 10/04/2021    RBC 3.29 (L) 05/01/2018    HGB 14.4 10/04/2021    HGB 10.3 (L) 05/01/2018    HCT 42.9 10/04/2021    HCT 29.2 (L) 05/01/2018    MCV 92 10/04/2021    MCV 89 05/01/2018    MCH 30.9 10/04/2021    MCH 31.3 05/01/2018    MCHC 33.6 10/04/2021    MCHC 35.3 05/01/2018    RDW 12.8 10/04/2021    RDW 12.0 05/01/2018     10/04/2021     05/01/2018       BMP RESULTS:  Lab Results   Component Value Date     10/04/2021     (L) 05/01/2018    POTASSIUM 4.1 10/04/2021    POTASSIUM 3.3 (L) 05/01/2018    CHLORIDE 104 10/04/2021    CHLORIDE 93 (L) 05/01/2018    CO2 29 10/04/2021    CO2 29 05/01/2018    ANIONGAP 5 10/04/2021    ANIONGAP 7 05/01/2018    GLC 95 10/04/2021     (H) 05/01/2018    BUN 12 10/04/2021    BUN 9 05/01/2018    CR 0.61 10/04/2021    CR 0.58 05/01/2018    GFRESTIMATED 80 10/04/2021    GFRESTIMATED >90 05/01/2018    GFRESTBLACK >90 05/01/2018    DEANA 9.1 10/04/2021    DEANA 8.0 (L) 05/01/2018        INR RESULTS:  Lab Results   Component Value Date    INR 1.14 06/15/2014       Echocardiogram 5/1/2018  Left ventricular function, chamber size, wall motion, and wall thickness are  normal.The EF is 60-65%. Grade II or moderate diastolic dysfunction. No  regional wall motion abnormalities are seen.  The right ventricle is normal size. Global right ventricular function is  normal.  Mild to moderate tricuspid insufficiency is present.  The inferior vena cava was dilated at 2.9 cm without respiratory variability,  consistent with increased  right atrial pressure. Estimated right atrial  pressure is 10-15 mmHg.  Trivial pericardial effusion is present.     This study was compared with the study from 9/10/10 . The IVC is more dilated  consistent with increase right atrial pressures.    EKG (personally reviewed) 6/4/2018: sinus rhythm, normal MA, QRS and QTc intervals.    Zio-Patch 5/14/2018 for 11 hours: No atrial fibrillation    Assessment and Plan:   Ms. Barbi Rebolledo is a 89 year old  female with PMH significant for hypertension and paroxysmal atrial fibrillation.  She had an episode paroxysmal atrial fibrillation with RVR in May 2018 which spontaneously converted to sinus rhythm (she was found to be hypokalemic due to hydrochlorothiazide).  Recently she was admitted to ER on 10/4/2021 with A. fib RVR.  She converted to sinus rhythm spontaneously.  She was off of diltiazem for a month.  She is back on diltiazem 180 mg daily since 10/4.  Recommended to continue current treatment with apixaban 2.5 mg twice daily and diltiazem 180 mg daily.  I reviewed patient's EKG in clinic today which shows sinus rhythm with occasional PACs.     No medication changes today.    Return to clinic 1 year.    A total of 40 minutes spent during this encounter including face-to-face clinic visit, review of medical documentation from recent ER admission and medical documentation.    Please donot hesitate to contact me if you have any questions or concerns. Again, thank you for allowing me to participate in the care of your patient.    Chandni LINARES MD  [facility] Division of Cardiology  Pager [phone]

## 2021-10-18 NOTE — NURSING NOTE
Chief Complaint   Patient presents with     Follow Up     follow up after 10-4-21 hospital admission      Vitals were taken and medications reconciled.    Sterling Lentz, EMT  3:20 PM

## 2021-10-18 NOTE — LETTER
10/18/2021      RE: Barbi Rebolledo  [address] [id number]       Dear Colleague,    Thank you for the opportunity to participate in the care of your patient, Barbi Rebolleod, at the [facility] HEART [facility]. Please see a copy of my visit note below.      HPI: Ms. Barbi Rebolledo is a 88 year old  female with PMH significant for hypertension and paroxysmal atrial fibrillation.  Barbi was admitted to the hospital on 4/30 due to symptoms of fatigue and atrial fibrillation with rapid ventricular response.  In the emergency department she converted spontaneously to sinus rhythm, however the rhythm then went back to atrial fibrillation again.  She was admitted to inpatient service for observation. She was on chlorthalidone and her potassium was 2.5 on admission.  Chlorthalidone was discontinued and potassium was replaced.  She was also on diltiazem, the dose of which was increased from 300 to 360 mg for better rate control.  She was also started on apixaban 2.5 mg twice a day. No bleeding issues with apixaban.  She is here today for one-month follow-up.  She denies any recurrence in her symptoms.  Overall she is doing very well from cardiac standpoint.The patient denies a history of chest discomfort, dyspnea, PND, orthopnea, pedal edema, palpitations, lightheadedness, and syncope  The patient's risk factor profile is: (+) HTN, (-) diabetes, (-) hyperlipidemia, (-) tobacco use, (-) family Hx CAD. No prior hx of cardiac disease.    I have reviewed her echocardiogram from 5/1/2018 which shows a structurally normal heart. I have also reviewed her ECG in clinic today which shows sinus rhythm, normal MD, QRS and QTc intervals.    Medications, personal, family, and social history reviewed with patient and revised.    Interval history 6/3/2019:  The patient returns for one-year follow-up.  She is overall doing very well from cardiac  standpoint.  She is on Eliquis 2.5 mg twice daily for one episode of paroxysmal atrial fibrillation in 2018.  No recurrence since then.  Denies palpitations, dizziness, syncope, chest pain, shortness of breath.  The patient reports mild lower extremity edema likely due to diltiazem she is on for hypertension treatment.  She follows with her primary care every 6 months at [facility] and her last potassium was 4.4.  She takes hydrochlorothiazide 25 mg, diltiazem to 40 mg and potassium 20 mEq daily.  She is physically very active.  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  The patient reports overall doing well.  She continues to paint and physically active.  No recurrent atrial fibrillation since the single episode in 2018.  Patient denies palpitations, dizziness, chest pain or lower extremity edema.  Patient today tells me that she no longer takes hydrochlorothiazide.  She is on apixaban 2.5 mg twice daily, diltiazem 300 mg daily and potassium replacement.  Most recent lab test from February of this year (available in care everywhere) shows normal BMP, potassium and CBC.  Patient follows with Dr. Balderas at [facility].  Patient reports overall feeling well.    Interval history 10/18/2021  Patient presented to ER on 10/4/2021 with atrial fibrillation with RVR.  Her major symptom was lightheadedness on that day.  Blood pressure was elevated at 200/130 mmHg.  Patient was off of diltiazem for a month at the time of admission (patient tells me that her primary care physician discontinued the medication).  She was given IV diltiazem which spontaneously converted her to sinus rhythm.  Labs including CBC, TSH, and BMP were normal. Patient was prescribed diltiazem 180 mg on discharge.    Patient is being seen today for follow-up.  She is overall feeling well.   "Denies palpitations, dizziness, syncope, lower extremity edema, chest pain or shortness of breath.  She brought a log of blood pressure measurements at home which shows normal blood pressure at home.  Patient has been taking apixaban 2.5 mg twice daily without any interruption.  EKG in clinic shows sinus rhythm.    PAST MEDICAL HISTORY:  Past Medical History:   Diagnosis Date     Squamous cell carcinoma        CURRENT MEDICATIONS:  Current Outpatient Medications   Medication Sig Dispense Refill     apixaban ANTICOAGULANT (ELIQUIS) 2.5 MG tablet Take 1 tablet (2.5 mg) by mouth 2 times daily 180 tablet 0     diltiazem ER (DILT-XR) 180 MG 24 hr capsule Take 1 capsule (180 mg) by mouth daily 30 capsule 0     emollient (VANICREAM) cream Apply topically daily to entire body, especially after bathing. (Patient not taking: Reported on 8/10/2020) 453 g 11     potassium chloride (KLOR-CON) 20 MEQ packet Take 20 mEq by mouth daily 90 packet 3     sodium fluoride dental gel (PREVIDENT) 1.1 % GEL Apply to affected area At Bedtime         PAST SURGICAL HISTORY:  Past Surgical History:   Procedure Laterality Date     MOHS MICROGRAPHIC PROCEDURE         ALLERGIES:     Allergies   Allergen Reactions     Fentanyl Other (See Comments)     Sedation     Lisinopril Swelling     Swelling of tongue     Novocain [Procaine] Other (See Comments)     Tachycardia       FAMILY HISTORY:  Family History   Problem Relation Age of Onset     Cancer No family hx of         no skin cancer     Skin Cancer No family hx of         no skin cancer         SOCIAL HISTORY:  Social History     Tobacco Use     Smoking status: Never Smoker     Smokeless tobacco: Never Used   Substance Use Topics     Alcohol use: Not on file     Drug use: Not on file       Exam:  BP (!) 167/78 (BP Location: Right arm, Patient Position: Chair, Cuff Size: Adult Small)   Pulse 79   Ht 1.541 m (5' 0.67\")   Wt 51.3 kg (113 lb 3.2 oz)   SpO2 95%   BMI 21.62 kg/m    GENERAL " APPEARANCE: alert and no distress  HEENT: no icterus, no central cyanosis  LYMPH/NECK: no adenopathy, no asymmetry, JVP not elevated.  RESPIRATORY: lungs clear to auscultation - no rales, rhonchi or wheezes, no use of accessory muscles, no retractions, respirations are unlabored, normal respiratory rate  CARDIOVASCULAR: regular rhythm, normal S1, S2, no S3 or S4 and no murmur, click or rub, precordium quiet with normal PMI.  GI: soft, non tender  EXTREMITIES: peripheral pulses normal, no edema  NEURO: alert, normal speech,and affect  SKIN: no ecchymoses, no rashes     I have reviewed the labs and personally reviewed the imaging below and made my comment in the assessment and plan.    Labs:  CBC RESULTS:   Lab Results   Component Value Date    WBC 6.6 10/04/2021    WBC 9.4 05/01/2018    RBC 4.66 10/04/2021    RBC 3.29 (L) 05/01/2018    HGB 14.4 10/04/2021    HGB 10.3 (L) 05/01/2018    HCT 42.9 10/04/2021    HCT 29.2 (L) 05/01/2018    MCV 92 10/04/2021    MCV 89 05/01/2018    MCH 30.9 10/04/2021    MCH 31.3 05/01/2018    MCHC 33.6 10/04/2021    MCHC 35.3 05/01/2018    RDW 12.8 10/04/2021    RDW 12.0 05/01/2018     10/04/2021     05/01/2018       BMP RESULTS:  Lab Results   Component Value Date     10/04/2021     (L) 05/01/2018    POTASSIUM 4.1 10/04/2021    POTASSIUM 3.3 (L) 05/01/2018    CHLORIDE 104 10/04/2021    CHLORIDE 93 (L) 05/01/2018    CO2 29 10/04/2021    CO2 29 05/01/2018    ANIONGAP 5 10/04/2021    ANIONGAP 7 05/01/2018    GLC 95 10/04/2021     (H) 05/01/2018    BUN 12 10/04/2021    BUN 9 05/01/2018    CR 0.61 10/04/2021    CR 0.58 05/01/2018    GFRESTIMATED 80 10/04/2021    GFRESTIMATED >90 05/01/2018    GFRESTBLACK >90 05/01/2018    DEANA 9.1 10/04/2021    DEANA 8.0 (L) 05/01/2018        INR RESULTS:  Lab Results   Component Value Date    INR 1.14 06/15/2014       Echocardiogram 5/1/2018  Left ventricular function, chamber size, wall motion, and wall thickness are  normal.The  EF is 60-65%. Grade II or moderate diastolic dysfunction. No  regional wall motion abnormalities are seen.  The right ventricle is normal size. Global right ventricular function is  normal.  Mild to moderate tricuspid insufficiency is present.  The inferior vena cava was dilated at 2.9 cm without respiratory variability,  consistent with increased right atrial pressure. Estimated right atrial  pressure is 10-15 mmHg.  Trivial pericardial effusion is present.     This study was compared with the study from 9/10/10 . The IVC is more dilated  consistent with increase right atrial pressures.    EKG (personally reviewed) 6/4/2018: sinus rhythm, normal CT, QRS and QTc intervals.    Zio-Patch 5/14/2018 for 11 hours: No atrial fibrillation    Assessment and Plan:   Ms. Barbi Rebolledo is a 89 year old  female with PMH significant for hypertension and paroxysmal atrial fibrillation.  She had an episode paroxysmal atrial fibrillation with RVR in May 2018 which spontaneously converted to sinus rhythm (she was found to be hypokalemic due to hydrochlorothiazide).  Recently she was admitted to ER on 10/4/2021 with A. fib RVR.  She converted to sinus rhythm spontaneously.  She was off of diltiazem for a month.  She is back on diltiazem 180 mg daily since 10/4.  Recommended to continue current treatment with apixaban 2.5 mg twice daily and diltiazem 180 mg daily.  I reviewed patient's EKG in clinic today which shows sinus rhythm with occasional PACs.     No medication changes today.    Return to clinic 1 year.    A total of 40 minutes spent during this encounter including face-to-face clinic visit, review of medical documentation from recent ER admission and medical documentation.    Please donot hesitate to contact me if you have any questions or concerns. Again, thank you for allowing me to participate in the care of your patient.    Chandni LINARES MD  [facility] Division of Cardiology  Pager [phone]

## 2021-10-19 LAB
ATRIAL RATE - MUSE: 77 BPM
DIASTOLIC BLOOD PRESSURE - MUSE: NORMAL MMHG
INTERPRETATION ECG - MUSE: NORMAL
P AXIS - MUSE: 60 DEGREES
PR INTERVAL - MUSE: 170 MS
QRS DURATION - MUSE: 72 MS
QT - MUSE: 376 MS
QTC - MUSE: 425 MS
R AXIS - MUSE: -7 DEGREES
SYSTOLIC BLOOD PRESSURE - MUSE: NORMAL MMHG
T AXIS - MUSE: 50 DEGREES
VENTRICULAR RATE- MUSE: 77 BPM

## 2021-11-15 ENCOUNTER — ANCILLARY PROCEDURE (OUTPATIENT)
Dept: CARDIOLOGY | Facility: CLINIC | Age: 86
End: 2021-11-15
Attending: INTERNAL MEDICINE
Payer: MEDICARE

## 2021-11-15 VITALS — SYSTOLIC BLOOD PRESSURE: 155 MMHG | DIASTOLIC BLOOD PRESSURE: 79 MMHG

## 2021-11-15 DIAGNOSIS — I48.0 PAROXYSMAL ATRIAL FIBRILLATION (H): ICD-10-CM

## 2021-11-15 LAB — LVEF ECHO: NORMAL

## 2021-11-15 PROCEDURE — 93306 TTE W/DOPPLER COMPLETE: CPT | Performed by: INTERNAL MEDICINE

## 2021-11-16 DIAGNOSIS — I10 BENIGN ESSENTIAL HYPERTENSION: ICD-10-CM

## 2021-11-16 DIAGNOSIS — I48.91 ATRIAL FIBRILLATION (H): Primary | ICD-10-CM

## 2021-11-29 NOTE — PATIENT INSTRUCTIONS
Lm on vm to call our office You were seen today in the Cardiovascular Clinic at the [facility].      Cardiology Providers you saw during your visit:  Dr. Bains    Diagnosis:  Paroxysmal atrial fibrillation    Results:  EKG reviewed    Recommendations:   Stop aspirin.    Follow-up:  1 year with Dr. Bains.      For emergencies call [phone].    For any scheduling needs, please call [phone]. Option 1 then option 3    Thank you for your visit today!     Please call if you have any questions or concerns.  Jose Marques RN

## 2021-12-03 ENCOUNTER — TELEPHONE (OUTPATIENT)
Dept: CARDIOLOGY | Facility: CLINIC | Age: 86
End: 2021-12-03
Payer: MEDICARE

## 2021-12-03 ENCOUNTER — NURSE TRIAGE (OUTPATIENT)
Dept: CARDIOLOGY | Facility: CLINIC | Age: 86
End: 2021-12-03
Payer: MEDICARE

## 2021-12-03 PROBLEM — E87.6 DRUG-INDUCED HYPOKALEMIA: Status: ACTIVE | Noted: 2018-01-01

## 2021-12-03 PROBLEM — T50.905A DRUG-INDUCED HYPOKALEMIA: Status: ACTIVE | Noted: 2018-01-01

## 2021-12-03 NOTE — TELEPHONE ENCOUNTER
Patient called the clinic.  She received a message in regards to her echocardiogram.  Patient reports that she has been experiencing shortness of breath and wheezing.  Patient was heading to an appointment in 3 minutes.  States that she was on hold for 45 minutes.  She would like a call to further discuss her symptoms and treatment plan.  Patient has an appointment with Dr. Bains on 12/7/21    Please call patient at [phone] around 4pm today (12/3/21)      Barbi,     Your heart function is normal.  They have seen some fluid around the heart.  The fluid is not interfering your heart function.  Your aorta which is a big vessel that comes off from the heart appears dilated which is I think related to your age.     I would like to repeat this echocardiogram in 6 months to recheck the size of the fluid around the heart.  In the meantime if you feel short of breath with or without activity activity let us know that we can recheck echocardiogram earlier.  Let me know if you have questions.          Written by Chandni Bains MD on 11/15/2021  3:36 PM CST  Seen by patient Barbi Rebolledo on 11/20/2021 11:17 AM

## 2021-12-03 NOTE — TELEPHONE ENCOUNTER
"Patient states she has been having low O2 levels (88%) when she wakes up in the morning for the past 6 months.  When she gets up and moves around it gets into the 90's.    She is also reporting she has SOB, but is reporting it is more that she gets winded with exertion.  She is also reporting this has been ongoing.      She states they did a workup of her heart and that seems to be OK, but now she is wondering if she could get an appointment with Family Practice for an overall check of her lungs and anything else that could be causing this.    RN had to transfer back to scheduling as I could not find the clinic she was looking for.    Additional Information    Negative: Breathing stopped and hasn't returned    Negative: Choking on something    Negative: SEVERE difficulty breathing (e.g., struggling for each breath, speaks in single words, pulse > 120)    Negative: Bluish (or gray) lips or face    Negative: Difficult to awaken or acting confused (e.g., disoriented, slurred speech)    Negative: Passed out (i.e., fainted, collapsed and was not responding)    Negative: Wheezing started suddenly after medicine, an allergic food, or bee sting    Negative: Stridor    Negative: Slow, shallow and weak breathing    Negative: Sounds like a life-threatening emergency to the triager    Negative: Chest pain    Negative: Wheezing (high pitched whistling sound) and previous asthma attacks or use of asthma medicines    Negative: Difficulty breathing and only present when coughing    Negative: Difficulty breathing and only from stuffy or runny nose    Negative: Difficulty breathing and within 14 days of COVID-19 Exposure    Negative: MODERATE difficulty breathing (e.g., speaks in phrases, SOB even at rest, pulse 100-120) of new onset or worse than normal    Negative: Wheezing can be heard across the room    Negative: Drooling or spitting out saliva (because can't swallow)    Negative: Any history of prior \"blood clot\" in leg or " "lungs    Negative: Illness requiring prolonged bedrest in past month (e.g., immobilization, long hospital stay)    Negative: Hip or leg fracture (broken bone) in past month (or had cast on leg or ankle in past month)    Negative: Major surgery in the past month    Negative: Long-distance travel in past month (e.g., car, bus, train, plane; with trip lasting 6 or more hours)    Negative: Extra heart beats OR irregular heart beating   (i.e., \"palpitations\")    Protocols used: BREATHING DIFFICULTY-A-OH    "

## 2021-12-03 NOTE — TELEPHONE ENCOUNTER
Left message to return clinic call to .  Olyainka Agosto L.P.N.    Echocardiogram needed , next Chandni Bains MD.,Cardiology  Visit 12/07/21

## 2021-12-07 ENCOUNTER — ANCILLARY PROCEDURE (OUTPATIENT)
Dept: GENERAL RADIOLOGY | Facility: CLINIC | Age: 86
End: 2021-12-07
Attending: FAMILY MEDICINE
Payer: MEDICARE

## 2021-12-07 DIAGNOSIS — R53.83 FATIGUE, UNSPECIFIED TYPE: ICD-10-CM

## 2021-12-07 DIAGNOSIS — R06.09 DOE (DYSPNEA ON EXERTION): ICD-10-CM

## 2021-12-07 PROCEDURE — 71046 X-RAY EXAM CHEST 2 VIEWS: CPT | Performed by: RADIOLOGY

## 2021-12-07 NOTE — TELEPHONE ENCOUNTER
Patient cancelled clinic visit, echo  noted scheduled in future  .Olayinka Agosto L.P.N.,[facility].

## 2021-12-13 ENCOUNTER — LAB REQUISITION (OUTPATIENT)
Dept: LAB | Facility: CLINIC | Age: 86
End: 2021-12-13
Payer: MEDICARE

## 2021-12-13 DIAGNOSIS — R53.83 OTHER FATIGUE: ICD-10-CM

## 2021-12-13 DIAGNOSIS — R06.00 DYSPNEA, UNSPECIFIED: ICD-10-CM

## 2021-12-13 DIAGNOSIS — I48.0 PAROXYSMAL ATRIAL FIBRILLATION (H): ICD-10-CM

## 2021-12-13 LAB
ALBUMIN SERPL-MCNC: 3.9 G/DL (ref 3.4–5)
ALP SERPL-CCNC: 124 U/L (ref 40–150)
ALT SERPL W P-5'-P-CCNC: 20 U/L (ref 0–50)
ANION GAP SERPL CALCULATED.3IONS-SCNC: 9 MMOL/L (ref 3–14)
AST SERPL W P-5'-P-CCNC: 18 U/L (ref 0–45)
BILIRUB SERPL-MCNC: 0.5 MG/DL (ref 0.2–1.3)
BUN SERPL-MCNC: 18 MG/DL (ref 7–30)
CALCIUM SERPL-MCNC: 9.7 MG/DL (ref 8.5–10.1)
CHLORIDE BLD-SCNC: 102 MMOL/L (ref 94–109)
CO2 SERPL-SCNC: 26 MMOL/L (ref 20–32)
CREAT SERPL-MCNC: 0.65 MG/DL (ref 0.52–1.04)
GFR SERPL CREATININE-BSD FRML MDRD: 78 ML/MIN/1.73M2
GLUCOSE BLD-MCNC: 96 MG/DL (ref 70–99)
POTASSIUM BLD-SCNC: 3.9 MMOL/L (ref 3.4–5.3)
PROT SERPL-MCNC: 8.3 G/DL (ref 6.8–8.8)
SODIUM SERPL-SCNC: 137 MMOL/L (ref 133–144)
TSH SERPL DL<=0.005 MIU/L-ACNC: 1.7 MU/L (ref 0.4–4)

## 2021-12-13 PROCEDURE — 84443 ASSAY THYROID STIM HORMONE: CPT | Mod: ORL | Performed by: FAMILY MEDICINE

## 2021-12-13 PROCEDURE — 80053 COMPREHEN METABOLIC PANEL: CPT | Mod: ORL | Performed by: FAMILY MEDICINE

## 2021-12-20 ENCOUNTER — ANCILLARY PROCEDURE (OUTPATIENT)
Dept: GENERAL RADIOLOGY | Facility: CLINIC | Age: 86
End: 2021-12-20
Attending: INTERNAL MEDICINE
Payer: MEDICARE

## 2021-12-20 ENCOUNTER — OFFICE VISIT (OUTPATIENT)
Dept: CARDIOLOGY | Facility: CLINIC | Age: 86
End: 2021-12-20
Attending: INTERNAL MEDICINE
Payer: MEDICARE

## 2021-12-20 VITALS
HEART RATE: 68 BPM | DIASTOLIC BLOOD PRESSURE: 73 MMHG | BODY MASS INDEX: 20.09 KG/M2 | SYSTOLIC BLOOD PRESSURE: 161 MMHG | WEIGHT: 109.2 LBS | HEIGHT: 62 IN | OXYGEN SATURATION: 97 %

## 2021-12-20 DIAGNOSIS — I31.39 PERICARDIAL EFFUSION: Primary | ICD-10-CM

## 2021-12-20 DIAGNOSIS — I48.0 PAROXYSMAL ATRIAL FIBRILLATION (H): ICD-10-CM

## 2021-12-20 DIAGNOSIS — J90 PLEURAL EFFUSION: ICD-10-CM

## 2021-12-20 DIAGNOSIS — I10 BENIGN ESSENTIAL HYPERTENSION: ICD-10-CM

## 2021-12-20 PROCEDURE — G0463 HOSPITAL OUTPT CLINIC VISIT: HCPCS

## 2021-12-20 PROCEDURE — 71046 X-RAY EXAM CHEST 2 VIEWS: CPT | Performed by: RADIOLOGY

## 2021-12-20 PROCEDURE — 99215 OFFICE O/P EST HI 40 MIN: CPT | Performed by: INTERNAL MEDICINE

## 2021-12-20 ASSESSMENT — MIFFLIN-ST. JEOR: SCORE: 856.2

## 2021-12-20 ASSESSMENT — PAIN SCALES - GENERAL: PAINLEVEL: NO PAIN (0)

## 2021-12-20 NOTE — PROGRESS NOTES
HPI: Ms. Barbi Rebolledo is a 88 year old  female with PMH significant for hypertension and paroxysmal atrial fibrillation.  Barbi was admitted to the hospital on 4/30 due to symptoms of fatigue and atrial fibrillation with rapid ventricular response.  In the emergency department she converted spontaneously to sinus rhythm, however the rhythm then went back to atrial fibrillation again.  She was admitted to inpatient service for observation. She was on chlorthalidone and her potassium was 2.5 on admission.  Chlorthalidone was discontinued and potassium was replaced.  She was also on diltiazem, the dose of which was increased from 300 to 360 mg for better rate control.  She was also started on apixaban 2.5 mg twice a day. No bleeding issues with apixaban.  She is here today for one-month follow-up.  She denies any recurrence in her symptoms.  Overall she is doing very well from cardiac standpoint.The patient denies a history of chest discomfort, dyspnea, PND, orthopnea, pedal edema, palpitations, lightheadedness, and syncope  The patient's risk factor profile is: (+) HTN, (-) diabetes, (-) hyperlipidemia, (-) tobacco use, (-) family Hx CAD. No prior hx of cardiac disease.    I have reviewed her echocardiogram from 5/1/2018 which shows a structurally normal heart. I have also reviewed her ECG in clinic today which shows sinus rhythm, normal CT, QRS and QTc intervals.    Medications, personal, family, and social history reviewed with patient and revised.    Interval history 6/3/2019:  The patient returns for one-year follow-up.  She is overall doing very well from cardiac standpoint.  She is on Eliquis 2.5 mg twice daily for one episode of paroxysmal atrial fibrillation in 2018.  No recurrence since then.  Denies palpitations, dizziness, syncope, chest pain, shortness of breath.  The patient reports mild lower extremity edema likely due to diltiazem she is on for hypertension treatment.  She follows with her  primary care every 6 months at [facility] and her last potassium was 4.4.  She takes hydrochlorothiazide 25 mg, diltiazem to 40 mg and potassium 20 mEq daily.  She is physically very active.  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  The patient reports overall doing well.  She continues to paint and physically active.  No recurrent atrial fibrillation since the single episode in 2018.  Patient denies palpitations, dizziness, chest pain or lower extremity edema.  Patient today tells me that she no longer takes hydrochlorothiazide.  She is on apixaban 2.5 mg twice daily, diltiazem 300 mg daily and potassium replacement.  Most recent lab test from February of this year (available in care everywhere) shows normal BMP, potassium and CBC.  Patient follows with Dr. Balderas at [facility].  Patient reports overall feeling well.    Interval history 10/18/2021  Patient presented to ER on 10/4/2021 with atrial fibrillation with RVR.  Her major symptom was lightheadedness on that day.  Blood pressure was elevated at 200/130 mmHg.  Patient was off of diltiazem for a month at the time of admission (patient tells me that her primary care physician discontinued the medication).  She was given IV diltiazem which spontaneously converted her to sinus rhythm.  Labs including CBC, TSH, and BMP were normal. Patient was prescribed diltiazem 180 mg on discharge.    Patient is being seen today for follow-up.  She is overall feeling well.  Denies palpitations, dizziness, syncope, lower extremity edema, chest pain or shortness of breath.  She brought a log of blood pressure measurements at home which shows normal blood pressure at home.  Patient has been taking apixaban 2.5 mg twice daily without any interruption.  EKG in clinic shows sinus rhythm.    Interval history  12/20/2021:  She is being seen today for follow-up on pericardial effusion recently detected on transthoracic echocardiogram.  She had echocardiogram on 11/15/2021 which showed small to moderate pericardial effusion.  Biventricular function is normal.    Patient tells me that she has developed shortness of breath with activity almost a month ago.  She was not able to lie down flat to sleep at that time.  Subsequently she has seen her primary care physician.  She had a chest x-ray on 12/7/2021 which showed moderate right-sided pleural effusion with associated atelectasis and enlarged cardiac silhouette.  She had multiple blood tests with her primary care physician including CBC, TSH, BNP and comprehensive metabolic panel.  All were unremarkable.    Today she is wondering if she will need to do any further testing with regards pericardial effusion/pleural effusion.  Over the last month or so shortness of breath with activity is getting better.  She is now able to lie down flat.  Denies cough, palpitations, chest pain or lower extremity edema.  She has lost few pounds over the last 1 month.  Her appetite is not that great.  No swallowing difficulty.  No abdominal pain.  She tells me that shortness of breath with activity is pretty new to her.    She had Covid vaccine and wondering if the pleural/pericardial effusion is related to Covid vaccine.    Her blood pressure is elevated in clinic today however she reports normal blood pressure at home usually 138 mmHg systolic.    PAST MEDICAL HISTORY:  Past Medical History:   Diagnosis Date     Squamous cell carcinoma        CURRENT MEDICATIONS:  Current Outpatient Medications   Medication Sig Dispense Refill     apixaban ANTICOAGULANT (ELIQUIS) 2.5 MG tablet Take 1 tablet (2.5 mg) by mouth 2 times daily 180 tablet 0     diltiazem ER (DILT-XR) 180 MG 24 hr capsule Take 1 capsule (180 mg) by mouth daily 90 capsule 3     emollient (VANICREAM) cream Apply topically daily to  "entire body, especially after bathing. 453 g 11     potassium chloride (KLOR-CON) 20 MEQ packet Take 20 mEq by mouth daily (Patient not taking: Reported on 10/18/2021) 90 packet 3     sodium fluoride dental gel (PREVIDENT) 1.1 % GEL Apply to affected area At Bedtime (Patient not taking: Reported on 10/18/2021)         PAST SURGICAL HISTORY:  Past Surgical History:   Procedure Laterality Date     MOHS MICROGRAPHIC PROCEDURE         ALLERGIES:     Allergies   Allergen Reactions     Fentanyl Other (See Comments)     Sedation     Lisinopril Swelling     Swelling of tongue     Novocain [Procaine] Other (See Comments)     Tachycardia       FAMILY HISTORY:  Family History   Problem Relation Age of Onset     Cancer No family hx of         no skin cancer     Skin Cancer No family hx of         no skin cancer         SOCIAL HISTORY:  Social History     Tobacco Use     Smoking status: Never Smoker     Smokeless tobacco: Never Used   Substance Use Topics     Alcohol use: None     Drug use: None       Exam:  BP (!) 161/73 (BP Location: Right arm, Patient Position: Sitting, Cuff Size: Adult Small)   Pulse 68   Ht 1.563 m (5' 1.54\")   Wt 49.5 kg (109 lb 3.2 oz)   SpO2 97%   BMI 20.28 kg/m    GENERAL APPEARANCE: alert and no distress  HEENT: no icterus, no central cyanosis  LYMPH/NECK: no adenopathy, no asymmetry, JVP not elevated.  RESPIRATORY: lungs clear to auscultation - no rales, rhonchi or wheezes, no use of accessory muscles, no retractions, respirations are unlabored, normal respiratory rate  CARDIOVASCULAR: regular rhythm, normal S1, S2, no S3 or S4 and no murmur, click or rub, precordium quiet with normal PMI.  GI: soft, non tender  EXTREMITIES:no edema  NEURO: alert, normal speech,and affect  SKIN: no ecchymoses, no rashes     I have reviewed the labs and personally reviewed the imaging below and made my comment in the assessment and plan.    Labs:  CBC RESULTS:   Lab Results   Component Value Date    WBC 6.6 " 10/04/2021    WBC 9.4 05/01/2018    RBC 4.66 10/04/2021    RBC 3.29 (L) 05/01/2018    HGB 14.4 10/04/2021    HGB 10.3 (L) 05/01/2018    HCT 42.9 10/04/2021    HCT 29.2 (L) 05/01/2018    MCV 92 10/04/2021    MCV 89 05/01/2018    MCH 30.9 10/04/2021    MCH 31.3 05/01/2018    MCHC 33.6 10/04/2021    MCHC 35.3 05/01/2018    RDW 12.8 10/04/2021    RDW 12.0 05/01/2018     10/04/2021     05/01/2018       BMP RESULTS:  Lab Results   Component Value Date     12/13/2021     (L) 05/01/2018    POTASSIUM 3.9 12/13/2021    POTASSIUM 3.3 (L) 05/01/2018    CHLORIDE 102 12/13/2021    CHLORIDE 93 (L) 05/01/2018    CO2 26 12/13/2021    CO2 29 05/01/2018    ANIONGAP 9 12/13/2021    ANIONGAP 7 05/01/2018    GLC 96 12/13/2021     (H) 05/01/2018    BUN 18 12/13/2021    BUN 9 05/01/2018    CR 0.65 12/13/2021    CR 0.58 05/01/2018    GFRESTIMATED 78 12/13/2021    GFRESTIMATED >90 05/01/2018    GFRESTBLACK >90 05/01/2018    DEANA 9.7 12/13/2021    DEANA 8.0 (L) 05/01/2018        INR RESULTS:  Lab Results   Component Value Date    INR 1.14 06/15/2014     Transthoracic echocardiogram 11/15/2021  Global and regional left ventricular function is normal with an EF of 55-60%.  Right ventricular function, chamber size, wall motion, and thickness are  normal.  Ascending aorta 4 cm.  Aortic index 26.6mm/m2,severe dilation for BSA of 1.5m2.  Dilation of the inferior vena cava is present with abnormal respiratory  variation in diameter.  Small to moderate pericardial effusion. No evidence of Tamponade apart from  dilated IVC.  Pericardial effusion is new when compared to previous study.    Echocardiogram 5/1/2018  Left ventricular function, chamber size, wall motion, and wall thickness are  normal.The EF is 60-65%. Grade II or moderate diastolic dysfunction. No  regional wall motion abnormalities are seen.  The right ventricle is normal size. Global right ventricular function is  normal.  Mild to moderate tricuspid  insufficiency is present.  The inferior vena cava was dilated at 2.9 cm without respiratory variability,  consistent with increased right atrial pressure. Estimated right atrial  pressure is 10-15 mmHg.  Trivial pericardial effusion is present.     This study was compared with the study from 9/10/10 . The IVC is more dilated  consistent with increase right atrial pressures.    EKG (personally reviewed) 6/4/2018: sinus rhythm, normal WI, QRS and QTc intervals.    Zio-Patch 5/14/2018 for 11 hours: No atrial fibrillation    Assessment and Plan:     #Moderate size right-sided pleural effusion   #Mild to moderate pericardial effusion  -12/7/2021 chest x-ray showed moderate-sized pleural effusion.  At the same time she was also found to have mild to moderate pericardial effusion.  I am not sure if she had a viral infection at that time (versus vaccine reaction).  But today she is reporting that her shortness of breath is improving.  Physical exam is benign.  I still want her to see pulmonology with regards to moderate-sized pleural effusion to see if she needs further testing like chest CT.   -I will recheck echo for follow-up of pericardial effusion.  -Chest x-ray ordered today.    #Hypertension   -Patient reports well-controlled blood pressure at home with diltiazem.      #Paroxysmal atrial fibrillation   -On rate control with diltiazem.  She is also on anticoagulation with Eliquis.  Since she has low body weight she is on lower dose (2.5 mg twice daily)     No medication changes today.    Return to clinic 1 year as scheduled earlier..    A total of 40 minutes spent during this encounter including face-to-face clinic visit, review of medical documentation hello and medical documentation.    Please donot hesitate to contact me if you have any questions or concerns. Again, thank you for allowing me to participate in the care of your patient.    Chandni LINARES MD  [facility] Division of Cardiology  Pager  [phone]

## 2021-12-20 NOTE — PATIENT INSTRUCTIONS
"Cardiology Provider You Saw During Your Visit: Dr. Bains      Medication Changes: None      Follow Up:   -Get echocardiogram  -Get chest Xray  -Referral placed for pulmonary ([phone])  -Follow up with Dr. Bains as needed      Follow the American Heart Association Diet and Lifestyle Recommendations:  -Limit saturated fat, trans fat, sodium, red meat, sweets and sugar-sweetened beverages. If you choose to eat red meat, compare labels and select the leanest cuts available.  -Aim for at least 150 minutes of moderate physical activity or 75 minutes of vigorous physical activity - or an equal combination of both - each week.      To Reach Us:  -During business hours: [phone], press option # 1 to be routed to the [facility] then option # 4 for \"To send a message to your care team\"     -After hours, weekends or holidays: [phone], press option #4 and ask to speak to the on-call cardiologist. Inform them you are a patient at the [facility].        Lauren Bowman RN  Cardiology Care Coordinator - General Cardiology  [facility]    "

## 2021-12-20 NOTE — NURSING NOTE
Chief Complaint   Patient presents with     Follow Up     patient requested follow up appointment for discussion at this time   Vitals were taken and medications reconciled.    Gregg Kim, EMT  10:56 AM

## 2021-12-20 NOTE — NURSING NOTE
Medication Changes: None      Follow Up:   -Get echocardiogram  -Get chest Xray  -Referral placed for pulmonary  -Follow up with Dr. Bains as needed      Patient verbalized understanding of all health information given and agreed to call with further questions or concerns.      Lauren Bowman RN

## 2021-12-20 NOTE — LETTER
12/20/2021      RE: Barbi Rebolledo  [address] [address]       Dear Colleague,    Thank you for the opportunity to participate in the care of your patient, Barbi Rebolledo, at the [facility] HEART [facility]. Please see a copy of my visit note below.      HPI: Ms. Barbi Rebolledo is a 88 year old  female with PMH significant for hypertension and paroxysmal atrial fibrillation.  Barbi was admitted to the hospital on 4/30 due to symptoms of fatigue and atrial fibrillation with rapid ventricular response.  In the emergency department she converted spontaneously to sinus rhythm, however the rhythm then went back to atrial fibrillation again.  She was admitted to inpatient service for observation. She was on chlorthalidone and her potassium was 2.5 on admission.  Chlorthalidone was discontinued and potassium was replaced.  She was also on diltiazem, the dose of which was increased from 300 to 360 mg for better rate control.  She was also started on apixaban 2.5 mg twice a day. No bleeding issues with apixaban.  She is here today for one-month follow-up.  She denies any recurrence in her symptoms.  Overall she is doing very well from cardiac standpoint.The patient denies a history of chest discomfort, dyspnea, PND, orthopnea, pedal edema, palpitations, lightheadedness, and syncope  The patient's risk factor profile is: (+) HTN, (-) diabetes, (-) hyperlipidemia, (-) tobacco use, (-) family Hx CAD. No prior hx of cardiac disease.    I have reviewed her echocardiogram from 5/1/2018 which shows a structurally normal heart. I have also reviewed her ECG in clinic today which shows sinus rhythm, normal [place], QRS and QTc intervals.    Medications, personal, family, and social history reviewed with patient and revised.    Interval history 6/3/2019:  The patient returns for one-year follow-up.  She is overall doing very well from cardiac  standpoint.  She is on Eliquis 2.5 mg twice daily for one episode of paroxysmal atrial fibrillation in 2018.  No recurrence since then.  Denies palpitations, dizziness, syncope, chest pain, shortness of breath.  The patient reports mild lower extremity edema likely due to diltiazem she is on for hypertension treatment.  She follows with her primary care every 6 months at [facility] and her last potassium was 4.4.  She takes hydrochlorothiazide 25 mg, diltiazem to 40 mg and potassium 20 mEq daily.  She is physically very active.  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  The patient reports overall doing well.  She continues to paint and physically active.  No recurrent atrial fibrillation since the single episode in 2018.  Patient denies palpitations, dizziness, chest pain or lower extremity edema.  Patient today tells me that she no longer takes hydrochlorothiazide.  She is on apixaban 2.5 mg twice daily, diltiazem 300 mg daily and potassium replacement.  Most recent lab test from February of this year (available in care everywhere) shows normal BMP, potassium and CBC.  Patient follows with Dr. Balderas at [facility].  Patient reports overall feeling well.    Interval history 10/18/2021  Patient presented to ER on 10/4/2021 with atrial fibrillation with RVR.  Her major symptom was lightheadedness on that day.  Blood pressure was elevated at 200/130 mmHg.  Patient was off of diltiazem for a month at the time of admission (patient tells me that her primary care physician discontinued the medication).  She was given IV diltiazem which spontaneously converted her to sinus rhythm.  Labs including CBC, TSH, and BMP were normal. Patient was prescribed diltiazem 180 mg on discharge.    Patient is being seen today for follow-up.  She is overall feeling well.   Denies palpitations, dizziness, syncope, lower extremity edema, chest pain or shortness of breath.  She brought a log of blood pressure measurements at home which shows normal blood pressure at home.  Patient has been taking apixaban 2.5 mg twice daily without any interruption.  EKG in clinic shows sinus rhythm.    Interval history 12/20/2021:  She is being seen today for follow-up on pericardial effusion recently detected on transthoracic echocardiogram.  She had echocardiogram on 11/15/2021 which showed small to moderate pericardial effusion.  Biventricular function is normal.    Patient tells me that she has developed shortness of breath with activity almost a month ago.  She was not able to lie down flat to sleep at that time.  Subsequently she has seen her primary care physician.  She had a chest x-ray on 12/7/2021 which showed moderate right-sided pleural effusion with associated atelectasis and enlarged cardiac silhouette.  She had multiple blood tests with her primary care physician including CBC, TSH, BNP and comprehensive metabolic panel.  All were unremarkable.    Today she is wondering if she will need to do any further testing with regards pericardial effusion/pleural effusion.  Over the last month or so shortness of breath with activity is getting better.  She is now able to lie down flat.  Denies cough, palpitations, chest pain or lower extremity edema.  She has lost few pounds over the last 1 month.  Her appetite is not that great.  No swallowing difficulty.  No abdominal pain.  She tells me that shortness of breath with activity is pretty new to her.    She had Covid vaccine and wondering if the pleural/pericardial effusion is related to Covid vaccine.    Her blood pressure is elevated in clinic today however she reports normal blood pressure at home usually 138 mmHg systolic.    PAST MEDICAL HISTORY:  Past Medical History:   Diagnosis Date     Squamous cell carcinoma        CURRENT  "MEDICATIONS:  Current Outpatient Medications   Medication Sig Dispense Refill     apixaban ANTICOAGULANT (ELIQUIS) 2.5 MG tablet Take 1 tablet (2.5 mg) by mouth 2 times daily 180 tablet 0     diltiazem ER (DILT-XR) 180 MG 24 hr capsule Take 1 capsule (180 mg) by mouth daily 90 capsule 3     emollient (VANICREAM) cream Apply topically daily to entire body, especially after bathing. 453 g 11     potassium chloride (KLOR-CON) 20 MEQ packet Take 20 mEq by mouth daily (Patient not taking: Reported on 10/18/2021) 90 packet 3     sodium fluoride dental gel (PREVIDENT) 1.1 % GEL Apply to affected area At Bedtime (Patient not taking: Reported on 10/18/2021)         PAST SURGICAL HISTORY:  Past Surgical History:   Procedure Laterality Date     MOHS MICROGRAPHIC PROCEDURE         ALLERGIES:     Allergies   Allergen Reactions     Fentanyl Other (See Comments)     Sedation     Lisinopril Swelling     Swelling of tongue     Novocain [Procaine] Other (See Comments)     Tachycardia       FAMILY HISTORY:  Family History   Problem Relation Age of Onset     Cancer No family hx of         no skin cancer     Skin Cancer No family hx of         no skin cancer         SOCIAL HISTORY:  Social History     Tobacco Use     Smoking status: Never Smoker     Smokeless tobacco: Never Used   Substance Use Topics     Alcohol use: None     Drug use: None       Exam:  BP (!) 161/73 (BP Location: Right arm, Patient Position: Sitting, Cuff Size: Adult Small)   Pulse 68   Ht 1.563 m (5' 1.54\")   Wt 49.5 kg (109 lb 3.2 oz)   SpO2 97%   BMI 20.28 kg/m    GENERAL APPEARANCE: alert and no distress  HEENT: no icterus, no central cyanosis  LYMPH/NECK: no adenopathy, no asymmetry, JVP not elevated.  RESPIRATORY: lungs clear to auscultation - no rales, rhonchi or wheezes, no use of accessory muscles, no retractions, respirations are unlabored, normal respiratory rate  CARDIOVASCULAR: regular rhythm, normal S1, S2, no S3 or S4 and no murmur, click or rub, " precordium quiet with normal PMI.  GI: soft, non tender  EXTREMITIES:no edema  NEURO: alert, normal speech,and affect  SKIN: no ecchymoses, no rashes     I have reviewed the labs and personally reviewed the imaging below and made my comment in the assessment and plan.    Labs:  CBC RESULTS:   Lab Results   Component Value Date    WBC 6.6 10/04/2021    WBC 9.4 05/01/2018    RBC 4.66 10/04/2021    RBC 3.29 (L) 05/01/2018    HGB 14.4 10/04/2021    HGB 10.3 (L) 05/01/2018    HCT 42.9 10/04/2021    HCT 29.2 (L) 05/01/2018    MCV 92 10/04/2021    MCV 89 05/01/2018    MCH 30.9 10/04/2021    MCH 31.3 05/01/2018    MCHC 33.6 10/04/2021    MCHC 35.3 05/01/2018    RDW 12.8 10/04/2021    RDW 12.0 05/01/2018     10/04/2021     05/01/2018       BMP RESULTS:  Lab Results   Component Value Date     12/13/2021     (L) 05/01/2018    POTASSIUM 3.9 12/13/2021    POTASSIUM 3.3 (L) 05/01/2018    CHLORIDE 102 12/13/2021    CHLORIDE 93 (L) 05/01/2018    CO2 26 12/13/2021    CO2 29 05/01/2018    ANIONGAP 9 12/13/2021    ANIONGAP 7 05/01/2018    GLC 96 12/13/2021     (H) 05/01/2018    BUN 18 12/13/2021    BUN 9 05/01/2018    CR 0.65 12/13/2021    CR 0.58 05/01/2018    GFRESTIMATED 78 12/13/2021    GFRESTIMATED >90 05/01/2018    GFRESTBLACK >90 05/01/2018    DEANA 9.7 12/13/2021    DEANA 8.0 (L) 05/01/2018        INR RESULTS:  Lab Results   Component Value Date    INR 1.14 06/15/2014     Transthoracic echocardiogram 11/15/2021  Global and regional left ventricular function is normal with an EF of 55-60%.  Right ventricular function, chamber size, wall motion, and thickness are  normal.  Ascending aorta 4 cm.  Aortic index 26.6mm/m2,severe dilation for BSA of 1.5m2.  Dilation of the inferior vena cava is present with abnormal respiratory  variation in diameter.  Small to moderate pericardial effusion. No evidence of Tamponade apart from  dilated IVC.  Pericardial effusion is new when compared to previous  study.    Echocardiogram 5/1/2018  Left ventricular function, chamber size, wall motion, and wall thickness are  normal.The EF is 60-65%. Grade II or moderate diastolic dysfunction. No  regional wall motion abnormalities are seen.  The right ventricle is normal size. Global right ventricular function is  normal.  Mild to moderate tricuspid insufficiency is present.  The inferior vena cava was dilated at 2.9 cm without respiratory variability,  consistent with increased right atrial pressure. Estimated right atrial  pressure is 10-15 mmHg.  Trivial pericardial effusion is present.     This study was compared with the study from 9/10/10 . The IVC is more dilated  consistent with increase right atrial pressures.    EKG (personally reviewed) 6/4/2018: sinus rhythm, normal PA, QRS and QTc intervals.    Zio-Patch 5/14/2018 for 11 hours: No atrial fibrillation    Assessment and Plan:     #Moderate size right-sided pleural effusion   #Mild to moderate pericardial effusion  -12/7/2021 chest x-ray showed moderate-sized pleural effusion.  At the same time she was also found to have mild to moderate pericardial effusion.  I am not sure if she had a viral infection at that time (versus vaccine reaction).  But today she is reporting that her shortness of breath is improving.  Physical exam is benign.  I still want her to see pulmonology with regards to moderate-sized pleural effusion to see if she needs further testing like chest CT.   -I will recheck echo for follow-up of pericardial effusion.  -Chest x-ray ordered today.    #Hypertension   -Patient reports well-controlled blood pressure at home with diltiazem.      #Paroxysmal atrial fibrillation   -On rate control with diltiazem.  She is also on anticoagulation with Eliquis.  Since she has low body weight she is on lower dose (2.5 mg twice daily)     No medication changes today.    Return to clinic 1 year as scheduled earlier..    A total of 40 minutes spent during this  encounter including face-to-face clinic visit, review of medical documentation hello and medical documentation.    Please donot hesitate to contact me if you have any questions or concerns. Again, thank you for allowing me to participate in the care of your patient.    Chandni LINARES MD  [facility] Division of Cardiology  Pager [phone]

## 2021-12-28 ENCOUNTER — TELEPHONE (OUTPATIENT)
Dept: CARDIOLOGY | Facility: CLINIC | Age: 86
End: 2021-12-28
Payer: MEDICARE

## 2021-12-28 NOTE — TELEPHONE ENCOUNTER
[facility] Call Center    Phone Message    May a detailed message be left on voicemail: yes     Reason for Call: Other: Pt wondering if she even needs to see a Pulmologist since the earliest she can get in is March 2022.  Please advise pt if she still needs to make an appt.  Pt states she is feeling better every day.     Action Taken: Message routed to:  [facility] cardio    Travel Screening: Not Applicable

## 2021-12-30 ENCOUNTER — TELEPHONE (OUTPATIENT)
Dept: PULMONOLOGY | Facility: CLINIC | Age: 86
End: 2021-12-30
Payer: MEDICARE

## 2021-12-30 NOTE — TELEPHONE ENCOUNTER
[facility] Call Center    Phone Message    May a detailed message be left on voicemail: yes     Reason for Call: Other: [facility] Nurse in Cardiology called requesting appt sooner than 3-. Pt needs to be seen within 2 weeks dx Pleural effusion [J90]. Please baron message or email Wesley Becker. Thank-you.     Action Taken: Message routed to:  [facility] pulm    Travel Screening: Not Applicable

## 2022-01-02 ENCOUNTER — HEALTH MAINTENANCE LETTER (OUTPATIENT)
Age: 87
End: 2022-01-02

## 2022-01-05 NOTE — TELEPHONE ENCOUNTER
Spoke to patient, relayed that pulmonary clinic confirmed no sooner appts.  Patient reports feeling better day by day.  Gave her the option of making an appt for next available and then cancelling a week or two before if she feels it's not needed at that time.  She will consider.   Agrees with plan.  No further concerns.

## 2022-01-10 DIAGNOSIS — J90 PLEURAL EFFUSION: Primary | ICD-10-CM

## 2022-01-11 ENCOUNTER — TELEPHONE (OUTPATIENT)
Dept: PULMONOLOGY | Facility: CLINIC | Age: 87
End: 2022-01-11
Payer: MEDICARE

## 2022-01-11 NOTE — TELEPHONE ENCOUNTER
Spoke with pt about scheduling chest xray prior to visit with Dr. Vidal on 3/30. Details confirmed.

## 2022-01-21 ENCOUNTER — TELEPHONE (OUTPATIENT)
Dept: CARDIOLOGY | Facility: CLINIC | Age: 87
End: 2022-01-21

## 2022-01-21 ENCOUNTER — ANCILLARY PROCEDURE (OUTPATIENT)
Dept: CARDIOLOGY | Facility: CLINIC | Age: 87
End: 2022-01-21
Attending: INTERNAL MEDICINE
Payer: MEDICARE

## 2022-01-21 DIAGNOSIS — I31.39 PERICARDIAL EFFUSION: ICD-10-CM

## 2022-01-21 DIAGNOSIS — I31.39 PERICARDIAL EFFUSION: Primary | ICD-10-CM

## 2022-01-21 LAB — LVEF ECHO: NORMAL

## 2022-01-21 PROCEDURE — 93306 TTE W/DOPPLER COMPLETE: CPT | Performed by: INTERNAL MEDICINE

## 2022-01-21 NOTE — TELEPHONE ENCOUNTER
Need to schedule pt for limited echo in about 1 month to assess pericardial effusion.     Roshan Hobson, RN   Cardiology Nurse Coordinator

## 2022-01-21 NOTE — PROGRESS NOTES
Date: 1/21/2022    Time of Call: 3:03 PM     Diagnosis:  Pericardial effusion     [ TORB ] Ordering provider: Dr. Bains  Order: Repeat Limited Echo in 1 month to assess pericardial effusion     Order received by: CONI Jacques     Follow-up/additional notes: Orders placed. Will reach out to Clinic Coordinator to contact pt for scheduling.     Roshan Hobson RN   Cardiology Nurse Coordinator

## 2022-01-21 NOTE — RESULT ENCOUNTER NOTE
Orders placed. Pt call routed to Clinic Coordinators to contact pt for scheduling.     Roshan Hobson, RN   Cardiology Nurse Coordinator

## 2022-01-25 ENCOUNTER — MEDICAL CORRESPONDENCE (OUTPATIENT)
Dept: HEALTH INFORMATION MANAGEMENT | Facility: CLINIC | Age: 87
End: 2022-01-25
Payer: MEDICARE

## 2022-01-26 ENCOUNTER — TRANSFERRED RECORDS (OUTPATIENT)
Dept: HEALTH INFORMATION MANAGEMENT | Facility: CLINIC | Age: 87
End: 2022-01-26
Payer: MEDICARE

## 2022-01-28 ENCOUNTER — ANCILLARY PROCEDURE (OUTPATIENT)
Dept: CT IMAGING | Facility: CLINIC | Age: 87
End: 2022-01-28
Attending: NURSE PRACTITIONER
Payer: MEDICARE

## 2022-01-28 DIAGNOSIS — R06.09 DOE (DYSPNEA ON EXERTION): ICD-10-CM

## 2022-01-28 LAB — RADIOLOGIST FLAGS: ABNORMAL

## 2022-01-28 PROCEDURE — 71250 CT THORAX DX C-: CPT | Mod: GC | Performed by: RADIOLOGY

## 2022-01-31 ENCOUNTER — TRANSCRIBE ORDERS (OUTPATIENT)
Dept: OTHER | Age: 87
End: 2022-01-31
Payer: MEDICARE

## 2022-01-31 DIAGNOSIS — R91.8 LUNG MASS: Primary | ICD-10-CM

## 2022-02-01 ENCOUNTER — DOCUMENTATION ONLY (OUTPATIENT)
Dept: ONCOLOGY | Facility: CLINIC | Age: 87
End: 2022-02-01
Payer: MEDICARE

## 2022-02-07 NOTE — TELEPHONE ENCOUNTER
RECORDS RECEIVED FROM: external   DATE RECEIVED: 3.30.22   NOTES STATUS DETAILS   OFFICE NOTE from referring provider Received 1.31.22 [facility]   OFFICE NOTE from other specialist Received 1.25.22 [facility]    DISCHARGE SUMMARY from hospital     DISCHARGE REPORT from the ER     MEDICATION LIST Internal    IMAGING  (NEED IMAGES AND REPORTS)     CT SCAN Internal 1.28.22   CHEST XRAY (CXR) Internal 12.20.21 12.7.21   TESTS     PULMONARY FUNCTION TESTING (PFT)

## 2022-03-30 ENCOUNTER — PRE VISIT (OUTPATIENT)
Dept: PULMONOLOGY | Facility: CLINIC | Age: 87
End: 2022-03-30

## 2022-05-13 ENCOUNTER — CARE COORDINATION (OUTPATIENT)
Dept: CARDIOLOGY | Facility: CLINIC | Age: 87
End: 2022-05-13
Payer: MEDICARE

## 2022-05-13 ENCOUNTER — TELEPHONE (OUTPATIENT)
Dept: CARDIOLOGY | Facility: CLINIC | Age: 87
End: 2022-05-13
Payer: MEDICARE

## 2022-05-13 NOTE — PROGRESS NOTES
Pt had called to ask if she still needed to get her echo that is scheduled for Monday. I recommended the echo to assess for fluid around the heart. Pt states she had a paracardiocentesis in [place] in February. I recommended that the echo would still be beneficial to assess for any fluid since the procedure. Pt agreed and verbalized understanding.

## 2022-11-17 ENCOUNTER — MEDICAL CORRESPONDENCE (OUTPATIENT)
Dept: HEALTH INFORMATION MANAGEMENT | Facility: CLINIC | Age: 87
End: 2022-11-17

## 2022-11-19 ENCOUNTER — HEALTH MAINTENANCE LETTER (OUTPATIENT)
Age: 87
End: 2022-11-19

## 2022-11-21 ENCOUNTER — TRANSCRIBE ORDERS (OUTPATIENT)
Dept: OTHER | Age: 87
End: 2022-11-21

## 2022-11-21 DIAGNOSIS — L98.9 SKIN LESION OF FACE: Primary | ICD-10-CM

## 2022-12-08 ENCOUNTER — ANCILLARY PROCEDURE (OUTPATIENT)
Dept: ULTRASOUND IMAGING | Facility: CLINIC | Age: 87
End: 2022-12-08
Attending: INTERNAL MEDICINE
Payer: MEDICARE

## 2022-12-08 DIAGNOSIS — M79.89 LEFT LEG SWELLING: ICD-10-CM

## 2022-12-08 PROCEDURE — 93971 EXTREMITY STUDY: CPT | Mod: LT | Performed by: RADIOLOGY

## 2022-12-20 ENCOUNTER — LAB REQUISITION (OUTPATIENT)
Dept: LAB | Facility: CLINIC | Age: 87
End: 2022-12-20
Payer: MEDICARE

## 2022-12-20 ENCOUNTER — OFFICE VISIT (OUTPATIENT)
Dept: DERMATOLOGY | Facility: CLINIC | Age: 87
End: 2022-12-20
Payer: MEDICARE

## 2022-12-20 DIAGNOSIS — R21 RASH: ICD-10-CM

## 2022-12-20 DIAGNOSIS — Z85.828 HX OF BASAL CELL CARCINOMA: ICD-10-CM

## 2022-12-20 DIAGNOSIS — D49.2 NEOPLASM OF UNSPECIFIED BEHAVIOR OF BONE, SOFT TISSUE, AND SKIN: ICD-10-CM

## 2022-12-20 DIAGNOSIS — C34.91 MALIGNANT NEOPLASM OF UNSPECIFIED PART OF RIGHT BRONCHUS OR LUNG (H): ICD-10-CM

## 2022-12-20 DIAGNOSIS — L57.0 AK (ACTINIC KERATOSIS): ICD-10-CM

## 2022-12-20 DIAGNOSIS — L24.9 IRRITANT DERMATITIS: Primary | ICD-10-CM

## 2022-12-20 PROCEDURE — 80053 COMPREHEN METABOLIC PANEL: CPT | Mod: ORL | Performed by: FAMILY MEDICINE

## 2022-12-20 PROCEDURE — 11104 PUNCH BX SKIN SINGLE LESION: CPT | Performed by: DERMATOLOGY

## 2022-12-20 PROCEDURE — 88312 SPECIAL STAINS GROUP 1: CPT | Mod: TC | Performed by: DERMATOLOGY

## 2022-12-20 PROCEDURE — 99203 OFFICE O/P NEW LOW 30 MIN: CPT | Mod: 25 | Performed by: DERMATOLOGY

## 2022-12-20 PROCEDURE — 11103 TANGNTL BX SKIN EA SEP/ADDL: CPT | Performed by: DERMATOLOGY

## 2022-12-20 PROCEDURE — 17000 DESTRUCT PREMALG LESION: CPT | Mod: XS | Performed by: DERMATOLOGY

## 2022-12-20 PROCEDURE — 88305 TISSUE EXAM BY PATHOLOGIST: CPT | Mod: 26 | Performed by: DERMATOLOGY

## 2022-12-20 RX ORDER — DESONIDE 0.5 MG/G
OINTMENT TOPICAL 2 TIMES DAILY
Qty: 60 G | Refills: 3 | Status: ON HOLD | OUTPATIENT
Start: 2022-12-20 | End: 2023-03-22

## 2022-12-20 RX ORDER — LIDOCAINE HYDROCHLORIDE AND EPINEPHRINE 10; 10 MG/ML; UG/ML
3 INJECTION, SOLUTION INFILTRATION; PERINEURAL ONCE
Status: DISCONTINUED | OUTPATIENT
Start: 2022-12-20 | End: 2023-03-22

## 2022-12-20 ASSESSMENT — PAIN SCALES - GENERAL: PAINLEVEL: NO PAIN (0)

## 2022-12-20 NOTE — NURSING NOTE
Dermatology Rooming Note    Barbi Rebolledo's goals for this visit include:   Chief Complaint   Patient presents with     Derm Problem     Barbi states she is here for a skin lesion of face, rash on L leg      Nick Leonardo, Visit Facilitator

## 2022-12-20 NOTE — PATIENT INSTRUCTIONS
Wound Care After a Biopsy    What is a skin biopsy?  A skin biopsy allows the doctor to examine a very small piece of tissue under the microscope to determine the diagnosis and the best treatment for the skin condition. A local anesthetic (numbing medicine)  is injected with a very small needle into the skin area to be tested. A small piece of skin is taken from the area. Sometimes a suture (stitch) is used.     What are the risks of a skin biopsy?  I will experience scar, bleeding, swelling, pain, crusting and redness. I may experience incomplete removal or recurrence. Risks of this procedure are excessive bleeding, bruising, infection, nerve damage, numbness, thick (hypertrophic or keloidal) scar and non-diagnostic biopsy.    How should I care for my wound for the first 24 hours?  Keep the wound dry and covered for 24 hours  If it bleeds, hold direct pressure on the area for 15 minutes. If bleeding does not stop then go to the emergency room  Avoid strenuous exercise the first 1-2 days or as your doctor instructs you    How should I care for the wound after 24 hours?  After 24 hours, remove the bandage  You may bathe or shower as normal  If you had a scalp biopsy, you can shampoo as usual and can use shower water to clean the biopsy site daily  Clean the wound twice a day with gentle soap and water  Do not scrub, be gentle  Apply white petroleum/Vaseline after cleaning the wound with a cotton swab or a clean finger, and keep the site covered with a Bandaid /bandage. Bandages are not necessary with a scalp biopsy  If you are unable to cover the site with a Bandaid /bandage, re-apply ointment 2-3 times a day to keep the site moist. Moisture will help with healing  Avoid strenuous activity for first 1-2 days  Avoid lakes, rivers, pools, and oceans until the stitches are removed or the site is healed    How do I clean my wound?  Wash hands thoroughly with soap or use hand  before all wound care  Clean the  wound with gentle soap and water  Apply white petroleum/Vaseline  to wound after it is clean  Replace the Bandaid /bandage to keep the wound covered for the first few days or as instructed by your doctor  If you had a scalp biopsy, warm shower water to the area on a daily basis should suffice    What should I use to clean my wound?   Cotton-tipped applicators (Qtips )  White petroleum jelly (Vaseline ). Use a clean new container and use Q-tips to apply.  Bandaids   as needed  Gentle soap     How should I care for my wound long term?  Do not get your wound dirty  Keep up with wound care for one week or until the area is healed.  A small scab will form and fall off by itself when the area is completely healed. The area will be red and will become pink in color as it heals. Sun protection is very important for how your scar will turn out. Sunscreen with an SPF 30 or greater is recommended once the area is healed.  If you have stitches, stitches need to be removed in 10-14 days. You may return to our clinic for this or you may have it done locally at your doctor s office.  You should have some soreness but it should be mild and slowly go away over several days. Talk to your doctor about using tylenol for pain,    When should I call my doctor?  If you have increased:   Pain or swelling  Pus or drainage (clear or slightly yellow drainage is ok)  Temperature over 100F  Spreading redness or warmth around wound    When will I hear about my results?  The biopsy results can take 2 weeks to come back.  Your results will automatically release to [facility] before your provider has even reviewed them.  The clinic will call you with the results, send you a [employer] message, or have you schedule a follow-up clinic or phone time to discuss the results.  Contact our clinics if you do not hear from us in 2 weeks.    Who should I call with questions?  [facility]: [phone]  [facility]  Critical access hospital: [phone]  For urgent needs outside of business hours call the [facility] at [phone] and ask for the dermatology resident on call   Cryotherapy    What is it?  Use of a very cold liquid, such as liquid nitrogen, to freeze and destroy abnormal skin cells that need to be removed    What should I expect?  Tenderness and redness  A small blister that might grow and fill with dark purple blood. There may be crusting.  More than one treatment may be needed if the lesions do not go away.    How do I care for the treated area?  Gently wash the area with your hands when bathing.  Use a thin layer of Vaseline to help with healing. You may use a Band-Aid.   The area should heal within 7-10 days and may leave behind a pink or lighter color.   Do not use an antibiotic or Neosporin ointment.   You may take acetaminophen (Tylenol) for pain.     Call your doctor if you have:  Severe pain  Signs of infection (warmth, redness, cloudy yellow drainage, and or a bad smell)  Questions or concerns    Who should I call with questions?      [facility]: [phone]      [facility]: [phone]      For urgent needs outside of business hours call the [facility] at [phone] and ask for the dermatology resident on call

## 2022-12-20 NOTE — PROGRESS NOTES
[facility] Dermatology Note  Encounter Date: Dec 20, 2022  Office Visit     Dermatology Problem List:  1. Hx of NMSC  2. Lung Cancer   - Current rx: Osimertinib (Tagrisso)   3. Actinic Keratosis, left lateral lip.  - s/p cryo 12/20/2022  4. NUB, left medial cheek near nasal sidewall.  - s/p shave bx 12/20/2022  5. NUB, left calf.  - s/p punch bx 12/20/2022  ____________________________________________    Assessment & Plan:    # Actinic keratosis, left lateral lower lip.  - Cryotherapy performed today, see procedure below.   # Irritant dermatitis, lips; new problem  - Desonide ointment, BID PRN  # NUB, left medial cheek near nasal sidewall. Ddx. BCC vs. Other   - Shave biopsy performed today, see procedure below.   - Photodocumentation done today.   - Refer to dermatology surgery if biopsy results returns as BCC  # NUB, left calf. Ddx. Vasculitis +/- status  - Punch biopsy performed today, see procedure below.     Procedures Performed:   - Cryotherapy procedure note, location(s): left lateral lower lip. After verbal consent and discussion of risks and benefits including, but not limited to, dyspigmentation/scar, blister, and pain, 1 lesion(s) was(were) treated with 1-2 mm freeze border for 1-2 cycles with liquid nitrogen. Post cryotherapy instructions were provided.  - Punch biopsy procedure note, location(s): left calf. After discussion of benefits and risks including but not limited to bleeding, infection, scar, incomplete removal, recurrence, and non-diagnostic biopsy, written consent and photographs were obtained. The area was cleaned with isopropyl alcohol. 0.5mL of 1% lidocaine with epinephrine was injected to obtain adequate anesthesia and a 4 mm punch biopsy was performed at site(s). 4-0 Prolene sutures were utilized to approximate the epidermal edges. White petrolatum ointment and a bandage was applied to the wound. Explicit verbal and written wound care instructions were provided. The  patient left the dermatology clinic in good condition.   - Shave biopsy procedure note, location(s): left medial cheek near nasal sidewall. After discussion of benefits and risks including but not limited to bleeding, infection, scar, incomplete removal, recurrence, and non-diagnostic biopsy, written consent and photographs were obtained. The area was cleaned with isopropyl alcohol. 0.5mL of 1% lidocaine with epinephrine was injected to obtain adequate anesthesia of 1 lesion(s). Shave biopsy at site(s) performed. Hemostasis was achieved with aluminium chloride. Petrolatum ointment and a sterile dressing were applied. The patient tolerated the procedure and no complications were noted. The patient was provided with verbal and written post care instructions.     Follow-up: 6 month(s) in-person, or earlier for new or changing lesions and pending path results     Staff and Scribe:     Scribe Disclosure:  I, Jeff Hsu, am serving as a scribe to document services personally performed by Laz Gasca MD based on data collection and the provider's statements to me.     Staff attestation:  The documentation recorded by the scribe accurately reflects the services I personally performed and the decisions I personally made. I have made edits where needed.    Laz Gasca MD  Staff Dermatologist and Dermatopathologist  , Department of Dermatology   ____________________________________________    CC: Derm Problem (Barbi states she is here for a skin lesion of face, rash on L leg )    HPI:  Ms. Barbi Rebolledo is a(n) 89 year old female who presents today as a new patient for a spot check on her face and a rash on her left leg. She mentions she had previous history of BCC removal. Patient reports a spot on her left side of the nose has been bleeding. Patient denies licking of lips or drooling. Patient reports skin on her left lower leg is itchy with red spots that she thinks may be nerve centers.  It used to go away by treating it with Vanicream but it is not working anymore. Her pervious doctor suggested using thick Vaseline and then Vanicream but symptoms persisted. Additionally, she states she is currently a lung cancer patient at [facility] who is on an experimental drug called Tagrisso.     Patient is otherwise feeling well, without additional skin concerns.    Labs Reviewed:  N/A    Physical Exam:  Vitals: There were no vitals taken for this visit.  SKIN: Focused examination of the face was performed.  - Pale pink erythema around vermilion border of the lip, primarily on the upper lip   - There is an erythematous macule with overyling adherent scale on the left lateral lower lip.   - 4 mm shiny telangiectatic papule on the left cheek.  - Mid shin to the ankle, confluent pale pink erythema with small light brown areas of discoloration and rare non-blanching slightly palpable red to purple lesions.   - No other lesions of concern on areas examined.     Medications:  Current Outpatient Medications   Medication     apixaban ANTICOAGULANT (ELIQUIS) 2.5 MG tablet     diltiazem ER (DILT-XR) 180 MG 24 hr capsule     emollient (VANICREAM) cream     sodium fluoride dental gel (PREVIDENT) 1.1 % GEL     potassium chloride (KLOR-CON) 20 MEQ packet     No current facility-administered medications for this visit.      Past Medical History:   Patient Active Problem List   Diagnosis     BCC (basal cell carcinoma), face     Frontal fibrosing alopecia     Xerosis of skin     Dermatitis, seborrheic     Inflamed seborrheic keratosis     SK (seborrheic keratosis)     History of nonmelanoma skin cancer     Atrial fibrillation (H)     Abnormal glandular Papanicolaou smear of cervix     Drug-induced hypokalemia     History of colonic polyps     Primary hypertension     TIA (transient ischemic attack)     Past Medical History:   Diagnosis Date     Squamous cell carcinoma         CC Juan Pablo Sharif MD  [facility]  [address]  [place] [address] on close of this encounter.

## 2022-12-20 NOTE — NURSING NOTE
Lidocaine-epinephrine 1-1:357497 % injection   1mL once for one use, starting 12/20/2022 ending 12/20/2022,  2mL disp, R-0, injection  Injected by Neha Spencer CMA

## 2022-12-21 LAB
ALBUMIN SERPL BCG-MCNC: 4.1 G/DL (ref 3.5–5.2)
ALP SERPL-CCNC: 121 U/L (ref 35–104)
ALT SERPL W P-5'-P-CCNC: 16 U/L (ref 10–35)
ANION GAP SERPL CALCULATED.3IONS-SCNC: 14 MMOL/L (ref 7–15)
AST SERPL W P-5'-P-CCNC: 26 U/L (ref 10–35)
BILIRUB SERPL-MCNC: 0.3 MG/DL
BUN SERPL-MCNC: 15.4 MG/DL (ref 8–23)
CALCIUM SERPL-MCNC: 9.1 MG/DL (ref 8.2–9.6)
CHLORIDE SERPL-SCNC: 104 MMOL/L (ref 98–107)
CREAT SERPL-MCNC: 0.61 MG/DL (ref 0.51–0.95)
DEPRECATED HCO3 PLAS-SCNC: 21 MMOL/L (ref 22–29)
GFR SERPL CREATININE-BSD FRML MDRD: 83 ML/MIN/1.73M2
GLUCOSE SERPL-MCNC: 84 MG/DL (ref 70–99)
POTASSIUM SERPL-SCNC: 4.1 MMOL/L (ref 3.4–5.3)
PROT SERPL-MCNC: 6.5 G/DL (ref 6.4–8.3)
SODIUM SERPL-SCNC: 139 MMOL/L (ref 136–145)

## 2022-12-22 LAB
PATH REPORT.COMMENTS IMP SPEC: ABNORMAL
PATH REPORT.COMMENTS IMP SPEC: ABNORMAL
PATH REPORT.COMMENTS IMP SPEC: YES
PATH REPORT.FINAL DX SPEC: ABNORMAL
PATH REPORT.GROSS SPEC: ABNORMAL
PATH REPORT.MICROSCOPIC SPEC OTHER STN: ABNORMAL
PATH REPORT.RELEVANT HX SPEC: ABNORMAL

## 2022-12-26 ENCOUNTER — TELEPHONE (OUTPATIENT)
Dept: DERMATOLOGY | Facility: CLINIC | Age: 87
End: 2022-12-26

## 2022-12-26 NOTE — TELEPHONE ENCOUNTER
Called patient to schedule surgery with Dr. Coulter    Date of Surgery: 02/20    Surgery type: mohs    Consult scheduled: Yes    Has patient had mohs with us before? No    Additional comments: pt requests 9:30 appt.      Mellissa James on 12/26/2022 at 11:00 AM

## 2022-12-27 NOTE — TELEPHONE ENCOUNTER
FUTURE VISIT INFORMATION      FUTURE VISIT INFORMATION:    Date: 2.15.23    Time: 2:00    Location: [facility]  REFERRAL INFORMATION:    Referring provider:  Elo    Referring providers clinic:  Derm    Reason for visit/diagnosis  BCC L medial cheek near nasal sidewall    RECORDS REQUESTED FROM:       Clinic name Comments Records Status Imaging Status   Derm 12.20.22  Path # [id number] [facility]

## 2022-12-30 ENCOUNTER — TELEPHONE (OUTPATIENT)
Dept: DERMATOLOGY | Facility: CLINIC | Age: 87
End: 2022-12-30

## 2022-12-30 NOTE — TELEPHONE ENCOUNTER
Prior Authorization Not Needed per Insurance    Medication: mupirocin (BACTROBAN) 2 % external ointment--NO PA NEEDED  Insurance Company: [employer] - Phone [phone] Fax [phone]  Expected CoPay:      Pharmacy Filling the Rx: [id number] [address] AT [address] [place] [address]  Pharmacy Notified: Yes  Patient Notified: Yes **Instructed pharmacy to notify patient when script is ready to /ship.**

## 2022-12-30 NOTE — TELEPHONE ENCOUNTER
Prior Authorization Retail Medication Request    Medication/Dose: Mupriocin 2% ointment 15 GM  ICD code (if different than what is on RX):  See chart  Previously Tried and Failed:  See chart  Rationale:  See chart    Insurance Name:  Medicare  Insurance ID:  [id number]      Pharmacy Information (if different than what is on RX)  Name:  See chart  Phone:  See chart

## 2023-02-15 ENCOUNTER — PRE VISIT (OUTPATIENT)
Dept: DERMATOLOGY | Facility: CLINIC | Age: 88
End: 2023-02-15

## 2023-02-15 ENCOUNTER — OFFICE VISIT (OUTPATIENT)
Dept: DERMATOLOGY | Facility: CLINIC | Age: 88
End: 2023-02-15
Payer: MEDICARE

## 2023-02-15 DIAGNOSIS — C44.319 BASAL CELL CARCINOMA OF LEFT MEDIAL CHEEK: Primary | ICD-10-CM

## 2023-02-15 PROCEDURE — 99214 OFFICE O/P EST MOD 30 MIN: CPT | Mod: GC | Performed by: DERMATOLOGY

## 2023-02-15 ASSESSMENT — PAIN SCALES - GENERAL: PAINLEVEL: NO PAIN (0)

## 2023-02-15 NOTE — PROGRESS NOTES
Mohs Micrographic Surgery Consult Note    Feb 15, 2023    Dermatology Problem List:  1. Hx of NMSC  - BCC, L medial cheek near nasal sidewall, s/p shave biopsy 12/20/2022  2. Lung Cancer   - Current rx: Osimertinib (Tagrisso)   3. Actinic Keratosis, left lateral lip.  - s/p cryo 12/20/2022  4. Stasis dermatitis with capillaritis, left calf s/p punch bx 12/20/2022    Subjective: The patient is a 89 year old woman who presents today for Mohs micrographic surgery consultation for a recent diagnosis of skin cancer.    Skin cancer(s): BCC  Location(s): L medial cheek  Associated symptoms: pruritus, tenderness to touch  Onset: within last 1 year    No other associated symptoms, modifying factors, or prior treatments, except when noted above. The patient denies any constitutional symptoms, lymphadenopathy, unintentional weight loss or decreased appetite. No other skin concerns today.    Objective:   Gen: This is a well appearing individual in no acute distress. The patient is alert and oriented x 3.  An exam of the face was performed today and visualized the following:  - 0.8 cm depressed pink biopsy scar on L medial cheek    Assessment and Plan:     # BCC, L medial cheek  1. Plan for Mohs micrographic surgery for skin cancer(s) above:  - We discussed the nature of the diagnosis/condition above. We discussed the treatment options, including the risks benefits and expectations of these options. We recommend micrographic surgery as the most effective and most tissue sparing option for treatment, and the patient agrees to proceed with this.  The patient is aware of the risks, benefits and expectations of this procedure. The patient will be scheduled for this procedure, if not already done so.  - We anticipate the following closure type: Sliding or lifting flap vs linear    The patient was discussed with and evaluated by attending physician, Brian Coulter MD.    Jorge Monroe MD  Dermatology, PGY-5  Mohs surgery  fellow    Scribe Disclosure:  I, Andrea Smith, am serving as a scribe to prepare the note to document services personally performed by Brian Coulter MD based on data collection and the provider's statements to me.     Attending Attestation  I attest that the Fellow recorded the interview and exam that I personally performed.  I have reviewed the note and edited it as necessary.    Brian Coulter M.D.  Professor  Director of Dermatologic Surgery  Department of Dermatology  [facility]

## 2023-02-15 NOTE — NURSING NOTE
Chief Complaint   Patient presents with     Derm Problem     Patient is here today for Mohs consult regarding left medial cheek BCC.     Aaliyah CUTLER RN

## 2023-02-15 NOTE — LETTER
2/15/2023       RE: Barbi Rebolledo  [address] [facility] [address]     Dear Colleague,    Thank you for referring your patient, Barbi Rebolledo, to the [facility] DERMATOLOGIC SURGERY CLINIC [place] at [facility]. Please see a copy of my visit note below.    Mohs Micrographic Surgery Consult Note    Feb 15, 2023    Dermatology Problem List:  1. Hx of NMSC  - BCC, L medial cheek near nasal sidewall, s/p shave biopsy 12/20/2022  2. Lung Cancer   - Current rx: Osimertinib (Tagrisso)   3. Actinic Keratosis, left lateral lip.  - s/p cryo 12/20/2022  4. Stasis dermatitis with capillaritis, left calf s/p punch bx 12/20/2022    Subjective: The patient is a 89 year old woman who presents today for Mohs micrographic surgery consultation for a recent diagnosis of skin cancer.    Skin cancer(s): BCC  Location(s): L medial cheek  Associated symptoms: pruritus, tenderness to touch  Onset: within last 1 year    No other associated symptoms, modifying factors, or prior treatments, except when noted above. The patient denies any constitutional symptoms, lymphadenopathy, unintentional weight loss or decreased appetite. No other skin concerns today.    Objective:   Gen: This is a well appearing individual in no acute distress. The patient is alert and oriented x 3.  An exam of the face was performed today and visualized the following:  - 0.8 cm depressed pink biopsy scar on L medial cheek    Assessment and Plan:     # BCC, L medial cheek  1. Plan for Mohs micrographic surgery for skin cancer(s) above:  - We discussed the nature of the diagnosis/condition above. We discussed the treatment options, including the risks benefits and expectations of these options. We recommend micrographic surgery as the most effective and most tissue sparing option for treatment, and the patient agrees to proceed with this.  The patient is aware of the risks, benefits and expectations  of this procedure. The patient will be scheduled for this procedure, if not already done so.  - We anticipate the following closure type: Sliding or lifting flap vs linear    The patient was discussed with and evaluated by attending physician, Brian Coulter MD.    Jorge Monroe MD  Dermatology, PGY-5  Mohs surgery fellow    Scribe Disclosure:  I, Andrea Smith, am serving as a scribe to prepare the note to document services personally performed by Brian Coulter MD based on data collection and the provider's statements to me.     Attending Attestation  I attest that the Fellow recorded the interview and exam that I personally performed.  I have reviewed the note and edited it as necessary.    Brian Coulter M.D.  Professor  Director of Dermatologic Surgery  Department of Dermatology  [facility]

## 2023-02-20 ENCOUNTER — OFFICE VISIT (OUTPATIENT)
Dept: DERMATOLOGY | Facility: CLINIC | Age: 88
End: 2023-02-20
Payer: MEDICARE

## 2023-02-20 VITALS — DIASTOLIC BLOOD PRESSURE: 67 MMHG | SYSTOLIC BLOOD PRESSURE: 153 MMHG | HEART RATE: 85 BPM

## 2023-02-20 DIAGNOSIS — C44.319 BASAL CELL CARCINOMA OF LEFT MEDIAL CHEEK: Primary | ICD-10-CM

## 2023-02-20 PROCEDURE — 17311 MOHS 1 STAGE H/N/HF/G: CPT | Mod: GC | Performed by: DERMATOLOGY

## 2023-02-20 PROCEDURE — 14040 TIS TRNFR F/C/C/M/N/A/G/H/F: CPT | Mod: GC | Performed by: DERMATOLOGY

## 2023-02-20 ASSESSMENT — PAIN SCALES - GENERAL: PAINLEVEL: NO PAIN (0)

## 2023-02-20 NOTE — PROGRESS NOTES
[facility] Mohs Surgery Procedure Note    Case #: 1  Date of Service:  Feb 20, 2023  Surgery: Mohs micrographic surgery (MMS)  Staff surgeon: Brian Coulter MD  Fellow surgeon: Jorge Monroe MD  Resident surgeon: Benja Nath MD  Nurse: Skylar Beltrán CMA    Tumor Type: BCC  Location: left medial cheek near nasal sidewall  Derm-Path Accession #: [id number]     Mohs Accession #:   Pre-Op Size: 0.6 cm x 0.8 cm  Final Defect Size: 1.0 cm x 1.0 cm  Number of Mohs stages: 1  Level of Defect: Fat  Local anesthetic: 6 mL 1% lidocaine with epinephrine  Repair Type: Advancement flap  Repair Size: 3.3 cm x 2.0 cm  Suture Material: 4-0 Monocryl; 5-0 fast absorbing gut    Procedure:    Stage I  We discussed the principles of treatment and most likely complications including scarring, bleeding, infection, swelling, pain, crusting, nerve damage, large wound,  incomplete excision, wound dehiscence,  nerve damage, recurrence, and a second procedure may be recommended to obtain the best cosmetic or functional result.    Informed consent was obtained and the patient underwent the procedure as follows:  The patient was placed supine on the operating table.  The cancer was identified, outlined with a marker, and verified by the patient.  The entire surgical field was prepped with chlorhexidine.  The surgical site was anesthetized using lidocaine with epinephrine.    The area of clinically apparent tumor was debulked. The layer of tissue was then surgically excised using a #15 blade and was then transferred onto a specimen sheet maintaining the orientation of the specimen. Hemostasis was obtained using electrocoagulation. The wound site was then covered with a dressing while the tissue samples were processed for examination.    The excised tissue was transported to the Mohs histology laboratory maintaining the tissue orientation.  The tissue specimen was relaxed so that the entire surgical margin was in a a  single horizontal plane for sectioning and inked for precise mapping.  A precise reference map was drawn to reflect the sectioning of the specimen, colored inking of the margins, and orientation on the patient.  The tissue was processed using horizontal sectioning of the base and continuous peripheral margins.  The histopathologic sections were reviewed in conjunction with the reference map.    Total blocks: 1  Total slides: 2    There were no cancer cells visualized on examination, therefore Mohs surgery was complete.    Reconstruction: Advancement Flap    PROCEDURE:  The wound was debeveled and undermined broadly in all directions to the level of fat. Hemostasis was obtained using electrocoagulation. The advancement flap was incised to the level of fat removing a cone of redundant tissue from nasal sidewall and medial cheek. The flap was further undermined in all directions.    Hemostasis was again obtained as above.  The flap was then advanced into the defect and secured using buried dermal sutures.  Redundant areas of tissue were excised using the triangulation technique.  The flap wound edges of both the primary and secondary defects were then approximated with buried dermal sutures, and the epidermis was then carefully approximated using 5-0 fast absorbing gut sutures.  The wound was cleansed with sterile saline, and ointment was applied along the wound surface.  A sterile pressure dressing of non-adherent gauze was applied and wound care instructions were given verbally and in writing. The patient left the operating suite in stable condition. Derma-Abrasion can be used as a second stage of this reconstruction to enhance cosmesis.    Brian Coulter MD was always immediately available.    Dr. Jorge Monroe (Mohs micrographic surgery fellow) performed the Mohs micrographic surgery and reconstruction under the direct supervision of Brian Coulter MD, who was present for the entire micrographic surgery and key portions  of the reconstruction, and always immediately available.    Joreg Monroe MD  Dermatology, PGY-5  Mohs surgery fellow    Scribe Disclosure:  I, Esau Rainey, am serving as a scribe to document services personally performed by Brian Coulter MD based on data collection and the provider's statements to me.       Attending attestation:  I was present for key elements of the procedure and immediately available for all other portions of the procedure.  I have reviewed the note and edited it as necessary.    Brian Coulter M.D.  Professor  Director of Dermatologic Surgery  Department of Dermatology  [facility]    Dermatology Surgery Clinic  [facility]  [address] [place], [place] [id number]

## 2023-02-20 NOTE — LETTER
2/20/2023       RE: Barbi Rebolledo  [address]  [facility] [address]     Dear Colleague,    Thank you for referring your patient, Barbi Rebolledo, to the [facility] DERMATOLOGIC SURGERY CLINIC [place] at [facility]. Please see a copy of my visit note below.    [facility] Mohs Surgery Procedure Note    Case #: 1  Date of Service:  Feb 20, 2023  Surgery: Mohs micrographic surgery (MMS)  Staff surgeon: Brian Coulter MD  Fellow surgeon: Jorge Monroe MD  Resident surgeon: Benja Nath MD  Nurse: Skylar Beltrán CMA    Tumor Type: BCC  Location: left medial cheek near nasal sidewall  Derm-Path Accession #: [id number]     Mohs Accession #:   Pre-Op Size: 0.6 cm x 0.8 cm  Final Defect Size: 1.0 cm x 1.0 cm  Number of Mohs stages: 1  Level of Defect: Fat  Local anesthetic: 6 mL 1% lidocaine with epinephrine  Repair Type: Advancement flap  Repair Size: 3.3 cm x 2.0 cm  Suture Material: 4-0 Monocryl; 5-0 fast absorbing gut    Procedure:    Stage I  We discussed the principles of treatment and most likely complications including scarring, bleeding, infection, swelling, pain, crusting, nerve damage, large wound,  incomplete excision, wound dehiscence,  nerve damage, recurrence, and a second procedure may be recommended to obtain the best cosmetic or functional result.    Informed consent was obtained and the patient underwent the procedure as follows:  The patient was placed supine on the operating table.  The cancer was identified, outlined with a marker, and verified by the patient.  The entire surgical field was prepped with chlorhexidine.  The surgical site was anesthetized using lidocaine with epinephrine.    The area of clinically apparent tumor was debulked. The layer of tissue was then surgically excised using a #15 blade and was then transferred onto a specimen sheet maintaining the orientation of the specimen. Hemostasis was  obtained using electrocoagulation. The wound site was then covered with a dressing while the tissue samples were processed for examination.    The excised tissue was transported to the Mohs histology laboratory maintaining the tissue orientation.  The tissue specimen was relaxed so that the entire surgical margin was in a a single horizontal plane for sectioning and inked for precise mapping.  A precise reference map was drawn to reflect the sectioning of the specimen, colored inking of the margins, and orientation on the patient.  The tissue was processed using horizontal sectioning of the base and continuous peripheral margins.  The histopathologic sections were reviewed in conjunction with the reference map.    Total blocks: 1  Total slides: 2    There were no cancer cells visualized on examination, therefore Mohs surgery was complete.    Reconstruction: Advancement Flap    PROCEDURE:  The wound was debeveled and undermined broadly in all directions to the level of fat. Hemostasis was obtained using electrocoagulation. The advancement flap was incised to the level of fat removing a cone of redundant tissue from nasal sidewall and medial cheek. The flap was further undermined in all directions.    Hemostasis was again obtained as above.  The flap was then advanced into the defect and secured using buried dermal sutures.  Redundant areas of tissue were excised using the triangulation technique.  The flap wound edges of both the primary and secondary defects were then approximated with buried dermal sutures, and the epidermis was then carefully approximated using 5-0 fast absorbing gut sutures.  The wound was cleansed with sterile saline, and ointment was applied along the wound surface.  A sterile pressure dressing of non-adherent gauze was applied and wound care instructions were given verbally and in writing. The patient left the operating suite in stable condition. Derma-Abrasion can be used as a second stage of  this reconstruction to enhance cosmesis.    Brian Coulter MD was always immediately available.    Dr. Jorge Monroe (Mohs micrographic surgery fellow) performed the Mohs micrographic surgery and reconstruction under the direct supervision of Brian Coulter MD, who was present for the entire micrographic surgery and key portions of the reconstruction, and always immediately available.    Jorge Monroe MD  Dermatology, PGY-5  Mohs surgery fellow    Scribe Disclosure:  I, Esau Rainey, am serving as a scribe to document services personally performed by Brian Coulter MD based on data collection and the provider's statements to me.       Attending attestation:  I was present for key elements of the procedure and immediately available for all other portions of the procedure.  I have reviewed the note and edited it as necessary.    Brian Coulter M.D.  Professor  Director of Dermatologic Surgery  Department of Dermatology  [facility]    Dermatology Surgery Clinic  [facility]  [address] [place], [place] [id number]

## 2023-02-20 NOTE — PATIENT INSTRUCTIONS

## 2023-02-20 NOTE — NURSING NOTE
Chief Complaint   Patient presents with     Derm Problem     Patient is here today for Mohs regarding BCC of left medial cheek near nasal sidewall.     Aaliyah CUTLER RN

## 2023-02-21 ENCOUNTER — TELEPHONE (OUTPATIENT)
Dept: DERMATOLOGY | Facility: CLINIC | Age: 88
End: 2023-02-21
Payer: MEDICARE

## 2023-02-21 NOTE — CONFIDENTIAL NOTE
Follow up call completed following Mohs procedure with Dr. Coulter       Are you having pain? no  Are you taking pain medication? no  Are you applying ice?  yes  Have you had any noticeable bleeding through the bandage? no   Do you have any other concerns? no       Please call [phone] option 3 if you have any questions or concerns.

## 2023-03-21 ENCOUNTER — HOSPITAL ENCOUNTER (INPATIENT)
Facility: CLINIC | Age: 88
LOS: 7 days | Discharge: HOME | DRG: 308 | End: 2023-03-28
Attending: EMERGENCY MEDICINE | Admitting: STUDENT IN AN ORGANIZED HEALTH CARE EDUCATION/TRAINING PROGRAM
Payer: MEDICARE

## 2023-03-21 ENCOUNTER — APPOINTMENT (OUTPATIENT)
Dept: GENERAL RADIOLOGY | Facility: CLINIC | Age: 88
DRG: 308 | End: 2023-03-21
Attending: EMERGENCY MEDICINE
Payer: MEDICARE

## 2023-03-21 ENCOUNTER — APPOINTMENT (OUTPATIENT)
Dept: CT IMAGING | Facility: CLINIC | Age: 88
DRG: 308 | End: 2023-03-21
Attending: EMERGENCY MEDICINE
Payer: MEDICARE

## 2023-03-21 DIAGNOSIS — I48.91 ATRIAL FIBRILLATION WITH RAPID VENTRICULAR RESPONSE (H): ICD-10-CM

## 2023-03-21 DIAGNOSIS — Z11.52 ENCOUNTER FOR SCREENING LABORATORY TESTING FOR SEVERE ACUTE RESPIRATORY SYNDROME CORONAVIRUS 2 (SARS-COV-2): ICD-10-CM

## 2023-03-21 DIAGNOSIS — G47.34 NOCTURNAL HYPOXIA: ICD-10-CM

## 2023-03-21 DIAGNOSIS — R53.83 OTHER FATIGUE: ICD-10-CM

## 2023-03-21 DIAGNOSIS — C34.90 PRIMARY MALIGNANT NEOPLASM OF LUNG METASTATIC TO OTHER SITE, UNSPECIFIED LATERALITY (H): ICD-10-CM

## 2023-03-21 DIAGNOSIS — I48.0 PAROXYSMAL ATRIAL FIBRILLATION (H): Primary | ICD-10-CM

## 2023-03-21 DIAGNOSIS — I50.9 CONGESTIVE HEART FAILURE, UNSPECIFIED HF CHRONICITY, UNSPECIFIED HEART FAILURE TYPE (H): ICD-10-CM

## 2023-03-21 DIAGNOSIS — R09.02 HYPOXIA: ICD-10-CM

## 2023-03-21 LAB
ALBUMIN SERPL BCG-MCNC: 4.1 G/DL (ref 3.5–5.2)
ALP SERPL-CCNC: 156 U/L (ref 35–104)
ALT SERPL W P-5'-P-CCNC: 20 U/L (ref 10–35)
ANION GAP SERPL CALCULATED.3IONS-SCNC: 11 MMOL/L (ref 7–15)
AST SERPL W P-5'-P-CCNC: 28 U/L (ref 10–35)
ATRIAL RATE - MUSE: 119 BPM
BASOPHILS # BLD AUTO: 0 10E3/UL (ref 0–0.2)
BASOPHILS NFR BLD AUTO: 0 %
BILIRUB SERPL-MCNC: 0.4 MG/DL
BUN SERPL-MCNC: 16.3 MG/DL (ref 8–23)
CALCIUM SERPL-MCNC: 9.2 MG/DL (ref 8.2–9.6)
CHLORIDE SERPL-SCNC: 102 MMOL/L (ref 98–107)
CREAT SERPL-MCNC: 0.65 MG/DL (ref 0.51–0.95)
DEPRECATED HCO3 PLAS-SCNC: 25 MMOL/L (ref 22–29)
DIASTOLIC BLOOD PRESSURE - MUSE: NORMAL MMHG
EOSINOPHIL # BLD AUTO: 0.1 10E3/UL (ref 0–0.7)
EOSINOPHIL NFR BLD AUTO: 2 %
ERYTHROCYTE [DISTWIDTH] IN BLOOD BY AUTOMATED COUNT: 13.9 % (ref 10–15)
GFR SERPL CREATININE-BSD FRML MDRD: 82 ML/MIN/1.73M2
GLUCOSE SERPL-MCNC: 135 MG/DL (ref 70–99)
HCT VFR BLD AUTO: 37 % (ref 35–47)
HGB BLD-MCNC: 12.2 G/DL (ref 11.7–15.7)
HOLD SPECIMEN: NORMAL
HOLD SPECIMEN: NORMAL
IMM GRANULOCYTES # BLD: 0 10E3/UL
IMM GRANULOCYTES NFR BLD: 0 %
INTERPRETATION ECG - MUSE: NORMAL
LYMPHOCYTES # BLD AUTO: 0.5 10E3/UL (ref 0.8–5.3)
LYMPHOCYTES NFR BLD AUTO: 11 %
MAGNESIUM SERPL-MCNC: 2.5 MG/DL (ref 1.7–2.3)
MCH RBC QN AUTO: 30.3 PG (ref 26.5–33)
MCHC RBC AUTO-ENTMCNC: 33 G/DL (ref 31.5–36.5)
MCV RBC AUTO: 92 FL (ref 78–100)
MONOCYTES # BLD AUTO: 0.5 10E3/UL (ref 0–1.3)
MONOCYTES NFR BLD AUTO: 12 %
NEUTROPHILS # BLD AUTO: 3.4 10E3/UL (ref 1.6–8.3)
NEUTROPHILS NFR BLD AUTO: 75 %
NRBC # BLD AUTO: 0 10E3/UL
NRBC BLD AUTO-RTO: 0 /100
P AXIS - MUSE: NORMAL DEGREES
PLATELET # BLD AUTO: 152 10E3/UL (ref 150–450)
POTASSIUM SERPL-SCNC: 4.1 MMOL/L (ref 3.4–5.3)
PR INTERVAL - MUSE: NORMAL MS
PROT SERPL-MCNC: 6.6 G/DL (ref 6.4–8.3)
QRS DURATION - MUSE: 80 MS
QT - MUSE: 322 MS
QTC - MUSE: 470 MS
R AXIS - MUSE: 50 DEGREES
RBC # BLD AUTO: 4.03 10E6/UL (ref 3.8–5.2)
SODIUM SERPL-SCNC: 138 MMOL/L (ref 136–145)
SYSTOLIC BLOOD PRESSURE - MUSE: NORMAL MMHG
T AXIS - MUSE: 46 DEGREES
TROPONIN T SERPL HS-MCNC: 14 NG/L
TSH SERPL DL<=0.005 MIU/L-ACNC: 1.46 UIU/ML (ref 0.3–4.2)
VENTRICULAR RATE- MUSE: 128 BPM
WBC # BLD AUTO: 4.6 10E3/UL (ref 4–11)

## 2023-03-21 PROCEDURE — 71045 X-RAY EXAM CHEST 1 VIEW: CPT

## 2023-03-21 PROCEDURE — 250N000009 HC RX 250: Performed by: EMERGENCY MEDICINE

## 2023-03-21 PROCEDURE — 96365 THER/PROPH/DIAG IV INF INIT: CPT

## 2023-03-21 PROCEDURE — 36415 COLL VENOUS BLD VENIPUNCTURE: CPT | Performed by: EMERGENCY MEDICINE

## 2023-03-21 PROCEDURE — 99291 CRITICAL CARE FIRST HOUR: CPT | Mod: 25

## 2023-03-21 PROCEDURE — C9803 HOPD COVID-19 SPEC COLLECT: HCPCS

## 2023-03-21 PROCEDURE — 99291 CRITICAL CARE FIRST HOUR: CPT | Mod: 25 | Performed by: EMERGENCY MEDICINE

## 2023-03-21 PROCEDURE — 71275 CT ANGIOGRAPHY CHEST: CPT | Mod: 26 | Performed by: RADIOLOGY

## 2023-03-21 PROCEDURE — 93010 ELECTROCARDIOGRAM REPORT: CPT | Performed by: EMERGENCY MEDICINE

## 2023-03-21 PROCEDURE — 84443 ASSAY THYROID STIM HORMONE: CPT | Performed by: STUDENT IN AN ORGANIZED HEALTH CARE EDUCATION/TRAINING PROGRAM

## 2023-03-21 PROCEDURE — 93005 ELECTROCARDIOGRAM TRACING: CPT

## 2023-03-21 PROCEDURE — G1010 CDSM STANSON: HCPCS

## 2023-03-21 PROCEDURE — 83735 ASSAY OF MAGNESIUM: CPT | Performed by: EMERGENCY MEDICINE

## 2023-03-21 PROCEDURE — 250N000011 HC RX IP 250 OP 636: Performed by: EMERGENCY MEDICINE

## 2023-03-21 PROCEDURE — 214N000001 HC R&B CCU UMMC

## 2023-03-21 PROCEDURE — 258N000003 HC RX IP 258 OP 636: Performed by: EMERGENCY MEDICINE

## 2023-03-21 PROCEDURE — 84484 ASSAY OF TROPONIN QUANT: CPT | Performed by: EMERGENCY MEDICINE

## 2023-03-21 PROCEDURE — 258N000003 HC RX IP 258 OP 636: Performed by: STUDENT IN AN ORGANIZED HEALTH CARE EDUCATION/TRAINING PROGRAM

## 2023-03-21 PROCEDURE — 82310 ASSAY OF CALCIUM: CPT | Performed by: EMERGENCY MEDICINE

## 2023-03-21 PROCEDURE — 250N000013 HC RX MED GY IP 250 OP 250 PS 637: Performed by: STUDENT IN AN ORGANIZED HEALTH CARE EDUCATION/TRAINING PROGRAM

## 2023-03-21 PROCEDURE — 250N000009 HC RX 250: Performed by: STUDENT IN AN ORGANIZED HEALTH CARE EDUCATION/TRAINING PROGRAM

## 2023-03-21 PROCEDURE — 85004 AUTOMATED DIFF WBC COUNT: CPT | Performed by: EMERGENCY MEDICINE

## 2023-03-21 PROCEDURE — G1010 CDSM STANSON: HCPCS | Mod: GC | Performed by: RADIOLOGY

## 2023-03-21 PROCEDURE — 99223 1ST HOSP IP/OBS HIGH 75: CPT | Mod: AI | Performed by: STUDENT IN AN ORGANIZED HEALTH CARE EDUCATION/TRAINING PROGRAM

## 2023-03-21 PROCEDURE — 71045 X-RAY EXAM CHEST 1 VIEW: CPT | Mod: 26 | Performed by: RADIOLOGY

## 2023-03-21 RX ORDER — LIDOCAINE 40 MG/G
CREAM TOPICAL
Status: DISCONTINUED | OUTPATIENT
Start: 2023-03-21 | End: 2023-03-28 | Stop reason: HOSPADM

## 2023-03-21 RX ORDER — DILTIAZEM HYDROCHLORIDE 180 MG/1
180 CAPSULE, COATED, EXTENDED RELEASE ORAL DAILY
Status: DISCONTINUED | OUTPATIENT
Start: 2023-03-21 | End: 2023-03-25

## 2023-03-21 RX ORDER — IOPAMIDOL 755 MG/ML
50 INJECTION, SOLUTION INTRAVASCULAR ONCE
Status: COMPLETED | OUTPATIENT
Start: 2023-03-21 | End: 2023-03-21

## 2023-03-21 RX ORDER — AMOXICILLIN 250 MG
1 CAPSULE ORAL 2 TIMES DAILY PRN
Status: DISCONTINUED | OUTPATIENT
Start: 2023-03-21 | End: 2023-03-28 | Stop reason: HOSPADM

## 2023-03-21 RX ORDER — AMOXICILLIN 250 MG
2 CAPSULE ORAL 2 TIMES DAILY PRN
Status: DISCONTINUED | OUTPATIENT
Start: 2023-03-21 | End: 2023-03-28 | Stop reason: HOSPADM

## 2023-03-21 RX ORDER — POLYETHYLENE GLYCOL 3350 17 G/17G
17 POWDER, FOR SOLUTION ORAL DAILY PRN
Status: DISCONTINUED | OUTPATIENT
Start: 2023-03-21 | End: 2023-03-28 | Stop reason: HOSPADM

## 2023-03-21 RX ORDER — ACETAMINOPHEN 325 MG/1
650 TABLET ORAL EVERY 6 HOURS PRN
Status: DISCONTINUED | OUTPATIENT
Start: 2023-03-21 | End: 2023-03-28 | Stop reason: HOSPADM

## 2023-03-21 RX ORDER — ACETAMINOPHEN 650 MG/1
650 SUPPOSITORY RECTAL EVERY 6 HOURS PRN
Status: DISCONTINUED | OUTPATIENT
Start: 2023-03-21 | End: 2023-03-28 | Stop reason: HOSPADM

## 2023-03-21 RX ADMIN — DILTIAZEM HYDROCHLORIDE 5 MG/HR: 5 INJECTION INTRAVENOUS at 13:12

## 2023-03-21 RX ADMIN — DILTIAZEM HYDROCHLORIDE 15 MG/HR: 5 INJECTION INTRAVENOUS at 22:34

## 2023-03-21 RX ADMIN — DILTIAZEM HYDROCHLORIDE 180 MG: 180 CAPSULE, COATED, EXTENDED RELEASE ORAL at 18:32

## 2023-03-21 RX ADMIN — IOPAMIDOL 50 ML: 755 INJECTION, SOLUTION INTRAVENOUS at 13:52

## 2023-03-21 ASSESSMENT — ACTIVITIES OF DAILY LIVING (ADL)
ADLS_ACUITY_SCORE: 37

## 2023-03-21 NOTE — H&P
[facility]    History and Physical - Medicine Service, MARANGELO TEAM 1       Date of Admission:  3/21/2023    Assessment & Plan      Barbi Rebolledo is a 89 year old female admitted on 3/21/2023. 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      # Afib with RVR  # Shortness of breath  Hypoxic at home and presented in Afib with RVR with rates in the 140s. Trigger of this episode unclear, electrolytes normal and no infectious symptoms, taking diltiazem as prescribed. Takes osimertinib for cancer which can have QT prolonging effects but does not appear to be associated with Afib. Cancer may be contributing. CT PE negative. Started on diltiazem gtt in ED with improvement in symptoms. Has not been on anticoagulation for approximately 1 year.   - Continue diltiazem gtt  - Additional dose of PO diltiazem 180mg   - TTE in AM   - Will discuss anticoagulation with patient   - Monitor electrolytes   - Will consider EP consult if rates not controlled on increased diltiazem        # Metastatic lung adenocarcinoma   Follows at [place].  - PTA osimertinib        Diet: Low Saturated Fat Na <2400 mg    DVT Prophylaxis: Pneumatic Compression Devices  Johnson Catheter: Not present  Fluids: none   Lines: None     Cardiac Monitoring: ACTIVE order. Indication: Tachyarrhythmias, acute (48 hours)  Code Status: Full Code      Clinically Significant Risk Factors Present on Admission               # Drug Induced Coagulation Defect: home medication list includes an anticoagulant medication                 Disposition Plan      Expected Discharge Date: 03/22/2023                The patient's care was discussed with the Attending Physician, Dr. Webber.      Justine Winston MD  Medicine Service, MARVA TEAM 1  [facility]  Securely message with [employer] (more info)  Text  page via [facility] Paging/Directory   See signed in provider for up to date coverage information  ______________________________________________________________________    Chief Complaint   Shortness of breath    History is obtained from the patient    History of Present Illness   Barbi Rebolledo is a 89 year old female who has a history of metastatic lung adenocarcinoma (mets to brain and liver), HTN, paroxysmal Afib, TIA who presented for shortness of breath. Yesterday morning and this morning she woke up with shortness of breath and feeling like her heart was racing. Yesterday she took her diltiazem which helped with her symptoms, today when she took her diltiazem her symptoms persisted. She regularly checks her O2 saturation at home and saw it was in the 80s today so she came to the ED. Has not taken apixaban since March 2022, at that time was having several procedures for cancer treatment so given the need to frequently hold the medication for procedures it was stopped altogether.     Of note had pericardial effusion drained in 2/14/22, was not found to be malignant and etiology unclear.     Follows at [place] for oncology treatment. Has an appointment upcoming to discuss next steps in cancer care, likely immunotherapy.     Lives in her own house with two sons, one son lives in the house next door.      ED course:  - In Afib with RVR, rates in the 140s  - Started on diltiazem drip with improvement in rates to 100-120s  - Labs largely unremarkable, K 4.1, Mg 2.5  - CT PE negative for PE but showed bilateral pleural effusions, new metastatic lesion in the right rib      Past Medical History    Past Medical History:   Diagnosis Date     Squamous cell carcinoma        Past Surgical History   Past Surgical History:   Procedure Laterality Date     MOHS MICROGRAPHIC PROCEDURE         Prior to Admission Medications   Prior to Admission Medications   Prescriptions Last Dose Informant Patient Reported? Taking?   apixaban  ANTICOAGULANT (ELIQUIS) 2.5 MG tablet   No No   Sig: Take 1 tablet (2.5 mg) by mouth 2 times daily   desonide (DESOWEN) 0.05 % external ointment   No No   Sig: Apply topically 2 times daily   diltiazem ER (DILT-XR) 180 MG 24 hr capsule   No No   Sig: Take 1 capsule (180 mg) by mouth daily   emollient (VANICREAM) cream   No No   Sig: Apply topically daily to entire body, especially after bathing.   Patient not taking: Reported on 2/15/2023   mupirocin (BACTROBAN) 2 % external ointment   No No   Sig: Use 2 times a day to affected area for 10 days. If no improvement after 3 days please let us know.   osimertinib (TAGRISSO) 80 MG tablet   Yes No   Sig: Take 80 mg by mouth daily   potassium chloride (KLOR-CON) 20 MEQ packet   Yes No   Sig: Take 20 mEq by mouth daily   Patient not taking: Reported on 10/18/2021   sodium fluoride dental gel (PREVIDENT) 1.1 % GEL   Yes No   Sig: Apply to affected area At Bedtime      Facility-Administered Medications Last Administration Doses Remaining   lidocaine 1% with EPINEPHrine 1:100,000 injection 3 mL None recorded 1           Social History   I have reviewed this patient's social history and updated it with pertinent information if needed.  Social History     Tobacco Use     Smoking status: Never     Smokeless tobacco: Never        Physical Exam   Vital Signs: Temp: 98.9  F (37.2  C) Temp src: Oral BP: 124/87 Pulse: 120   Resp: 15 SpO2: 98 % O2 Device: None (Room air)    Weight: 109 lbs 0 oz    General Appearance: NAD, appears stated age  Respiratory: breathing comfortably on room air, CTAB   Cardiovascular: irregularly irregular, tachycardic   GI: soft, NT, ND, normal bowel sounds  Skin: no lesions/bruising on exposed skin    Medical Decision Making         Data     I have personally reviewed the following data over the past 24 hrs:    4.6  \   12.2   / 152     138 102 16.3 /  135 (H)   4.1 25 0.65 \       ALT: 20 AST: 28 AP: 156 (H) TBILI: 0.4   ALB: 4.1 TOT PROTEIN: 6.6 LIPASE:  N/A       Trop: 14 BNP: N/A       Imaging results reviewed over the past 24 hrs:   Recent Results (from the past 24 hour(s))   CT Chest Pulmonary Embolism w Contrast    Narrative    EXAMINATION: CTA pulmonary angiogram, 3/21/2023 2:11 PM     COMPARISON: Chest CT 1/28/2022. The outside MR and outside CT dated  1/26/2022 and 1/23/2022 respectively are not accessible.    HISTORY: Shortness of breath, tachycardia, hx metastatic lung CA w/  effusions Shortness of breath, tachycardia, hx metastatic lung CA w/  effusions    TECHNIQUE: Volumetric helical acquisition of CT images of the chest  from the lung apices to the kidneys were acquired after the  administration of IV contrast. .  Post-processed multiplanar and/or  MIP reformations were obtained, archived to PACS and used in  interpretation of this study.     Contrast: iopamidol (ISOVUE-370) solution 50 mL    FINDINGS:    Contrast bolus is: adequate.  Exam is negative for acute pulmonary  embolism.     Chest:   Unremarkable thyroid. Unremarkable esophagus. Small sliding hiatal  hernia. Cardiomegaly. Multivessel coronary calcifications. No  significant pericardial effusion. Thoracic aortic caliber measures 4.0  cm in diameter. Pulmonary artery caliber measures 3.2 cm in diameter.  Aortic atherosclerosis. Subcarinal lymph node measures 1.0 x 1.8 cm  with normal fatty hilum (series 6, image 104). There is reflux of  contrast into the IVC and hepatic veins.    Lungs:    Moderate right and small-moderate left pleural effusion with adjacent  atelectasis/consolidation. Right upper lobe mass measuring 3.6 x 4.8  cm impinging on the right main bronchus and downstream bronchi causing  partial right upper lobe atelectasis. Bibasilar linear subsegmental  atelectasis. Scattered patchy groundglass opacities with a small  amount of interlobular septal thickening.No pneumothorax.     Abdomen: Vague rounded partially visualized hepatic hypodensity within  segment 4A/B (series 4, image  109).     Bones/Soft TIssue: Sclerotic focus contiguous with the inferior plate  of T7, new since chest CT 1/20/2022, which may be related to Schmorl's  nodes. Old right lateral third rib healed fracture. Multilevel  thoracolumbar degenerative changes including Schmorl's nodes,  chondrocalcinosis, anterior osteophytic spurring. Chronic compression  deformities of T7. Soft tissue density of the right posterior lateral  sixth rib, new since chest CT 1/20/2022.         Impression    IMPRESSION:   1.  No pulmonary embolism.  2.  Cardiomegaly with reflux of contrast into the IVC and hepatic  veins suggestive of fluid overload.  3.  Right upper lobe mass impinging on the right main bronchus causing  partial right upper lobe atelectasis.  4.  Moderate right and small-moderate left pleural effusion with  adjacent atelectasis/consolidation.  5.  Soft tissue density of the right posterior lateral sixth rib  concerning for metastatic lesion, new since 1/20/2022  6.  Indeterminate sclerotic focus within the T6 vertebra, new since  1/20/2022, which may be related to degenerative change.  7.  Partially visualized right hepatic hypodensity concerning for  metastatic lesion, new since 1/20/2022.    In the event of a positive result for acute pulmonary embolism  resulting in right heart strain, consider calling the   [facility] hospital  for PERT (Pulmonary Embolism Response Team)  Activation?    PERT -- Pulmonary Embolism Response Team (Multidisciplinary team  including cardiology, interventional radiology, critical care,  hematology)    I have personally reviewed the examination and initial interpretation  and I agree with the findings.    MARY SANTACRUZ MD         SYSTEM ID:  [id number]   XR Chest Port 1 View    Narrative    Exam: XR CHEST PORT 1 VIEW, 3/21/2023 2:17 PM    Comparison: Same day CT chest 03/21/2023, chest CT 1/28/2022, chest  x-ray 12/20/2021    History: SOB    Findings:  Portable AP view of the chest. Unchanged  borderline cardiomegaly.  Calcification at the aortic arch and descending thoracic aorta. No  pneumothorax. Bilateral small pleural effusions. Bibasilar hazy  opacities. Asymmetric opacity at the right hilar and suprahilar region  and an ill-defined opacity within the right upper lung zone, seen to  better advantage on same-day CT chest 03/21/2023.     Osseous structures are demineralized. Destructive lesion involving  the right posterolateral sixth rib measuring approximately 1.3 x 2.4  cm, new since prior chest radiograph 12/20/2021. T7 sclerotic lesion  identified on same-day CT chest is not well visualized on today's  chest radiograph.      Impression    Impression:   1. Opacities of the right hilar and suprahilar regions and an  ill-defined opacity within the right upper lobe related to known  obstructive pulmonary mass with probable postobstructive atelectasis.  Please refer to same-day CT chest for additional findings and  recommendations.  2. New destructive lesion involving the right posterior sixth rib  concerning for metastasis. Findings are new since prior chest  radiograph 12/20/2021. The indeterminate sclerotic lesion involving  the T7 vertebral body noted on same-day CT chest is not well  visualized on today's chest radiograph. Please refer to same-day CT  chest for additional findings and recommendations.  3. Bilateral small pleural effusions and associated bibasilar  opacities, atelectasis versus pneumonitis.  4. Stable borderline cardiomegaly.    I have personally reviewed the examination and initial interpretation  and I agree with the findings.    IVETTE MALDONADO MD         SYSTEM ID:  [id number]

## 2023-03-21 NOTE — ED PROVIDER NOTES
ED Provider Note  [facility]      History     Chief Complaint   Patient presents with     Palpitations     HPI  Barbi Rebolledo is a 89 year old female with a past medical history significant for metastatic lung cancer on tagrisso maintenance chemo, atrial fibrillation, HTN who presents to the Emergency Department for evaluation of heart palpitations.  Patient reports onset of symptoms yesterday.  She reports a sensation of racing heart.  This has been intermittent overnight.  This morning the patient noted oxygen saturations declining to the 80s.  She is typically upper 90s.  With that at home monitoring demonstrated a pulse above 130.  She does report dyspnea worse with exertion at this time.  She denies specific chest pain.  Patient does add that she has had several loose stools.  She has not had any episodes of atrial fibrillation to her knowledge in over 1 year since initiation of chemotherapy and utilization of diltiazem.  She does report with her prior episode she was hypokalemic.  She otherwise states has been eating and drinking adequately without upper GI loss such as nausea, vomiting.  She reports no recent illness such as fever, chills, nasal congestion, rhinorrhea.  Patient denies peripheral edema.    (LVEF 60-65% 1/21/22)    Past Medical History  Past Medical History:   Diagnosis Date     Squamous cell carcinoma      Past Surgical History:   Procedure Laterality Date     MOHS MICROGRAPHIC PROCEDURE       apixaban ANTICOAGULANT (ELIQUIS) 2.5 MG tablet  desonide (DESOWEN) 0.05 % external ointment  diltiazem ER (DILT-XR) 180 MG 24 hr capsule  emollient (VANICREAM) cream  mupirocin (BACTROBAN) 2 % external ointment  osimertinib (TAGRISSO) 80 MG tablet  potassium chloride (KLOR-CON) 20 MEQ packet  sodium fluoride dental gel (PREVIDENT) 1.1 % GEL      Allergies   Allergen Reactions     Fentanyl Other (See Comments)     Sedation     Lisinopril Swelling     Swelling of tongue      "Novocain [Procaine] Other (See Comments)     Tachycardia     Family History  Family History   Problem Relation Age of Onset     Cancer No family hx of         no skin cancer     Skin Cancer No family hx of         no skin cancer     Social History   Social History     Tobacco Use     Smoking status: Never     Smokeless tobacco: Never      Past medical history, past surgical history, medications, allergies, family history, and social history were reviewed with the patient. No additional pertinent items.      A medically appropriate review of systems was performed with pertinent positives and negatives noted in the HPI, and all other systems negative.    Physical Exam   BP: (!) 150/97  Pulse: (!) 137  Temp: 98.9  F (37.2  C)  Resp: 20  Height: 156.2 cm (5' 1.5\")  Weight: 49.4 kg (109 lb)  SpO2: 99 %  Physical Exam  Vitals and nursing note reviewed.   Constitutional:       General: She is in acute distress.      Appearance: Normal appearance. She is ill-appearing. She is not toxic-appearing or diaphoretic.   HENT:      Head: Normocephalic and atraumatic.      Right Ear: External ear normal.      Left Ear: External ear normal.      Nose: Nose normal. No congestion.      Mouth/Throat:      Mouth: Mucous membranes are moist.      Pharynx: Oropharynx is clear. No oropharyngeal exudate.   Eyes:      Extraocular Movements: Extraocular movements intact.      Conjunctiva/sclera: Conjunctivae normal.      Pupils: Pupils are equal, round, and reactive to light.   Cardiovascular:      Rate and Rhythm: Tachycardia present. Rhythm irregular.      Pulses: Normal pulses.      Heart sounds: Normal heart sounds. No murmur heard.    No friction rub.   Pulmonary:      Effort: Pulmonary effort is normal. No respiratory distress.      Breath sounds: No stridor. No wheezing, rhonchi or rales.   Abdominal:      General: Abdomen is flat. There is no distension.      Tenderness: There is no abdominal tenderness. There is no guarding or " rebound.   Musculoskeletal:         General: No deformity or signs of injury. Normal range of motion.      Cervical back: Normal range of motion.   Skin:     General: Skin is warm.      Capillary Refill: Capillary refill takes less than 2 seconds.      Coloration: Skin is not pale.      Findings: No bruising or erythema.   Neurological:      General: No focal deficit present.      Mental Status: She is alert.      Cranial Nerves: No cranial nerve deficit.      Motor: No weakness.   Psychiatric:         Mood and Affect: Mood normal.         Behavior: Behavior normal.           ED Course, Procedures, & Data      Procedures                     Results for orders placed or performed during the hospital encounter of 03/21/23   CT Chest Pulmonary Embolism w Contrast     Status: None (Preliminary result)    Impression    RESIDENT PRELIMINARY INTERPRETATION  IMPRESSION:   1.  No pulmonary embolism.  2.  Cardiomegaly with reflux of contrast into the IVC and hepatic  veins suggestive of fluid overload.  3.  Right upper lobe mass impinging on the right main bronchus causing  partial right upper lobe atelectasis.  4.  Moderate right and small-moderate left pleural effusion with  adjacent atelectasis/consolidation.  5.  Soft tissue density of the right posterior lateral sixth rib  concerning for metastatic lesion, new since 1/20/2022  6.  Indeterminate sclerotic focus within the T6 vertebra, new since  1/20/2022, which may be related to degenerative change.    In the event of a positive result for acute pulmonary embolism  resulting in right heart strain, consider calling the   [facility] hospital  for PERT (Pulmonary Embolism Response Team)  Activation?    PERT -- Pulmonary Embolism Response Team (Multidisciplinary team  including cardiology, interventional radiology, critical care,  hematology)   XR Chest Port 1 View     Status: None (Preliminary result)    Impression    RESIDENT PRELIMINARY INTERPRETATION  Impression:   1.  Opacities of the right hilum and right upper lobe consistent with  known pulmonary mass with likely postobstructive atelectasis.  2. Perihilar and bibasilar patchy opacities which may represent  atelectasis and/or edema.  3. Stable borderline cardiomegaly.   Comprehensive metabolic panel     Status: Abnormal   Result Value Ref Range    Sodium 138 136 - 145 mmol/L    Potassium 4.1 3.4 - 5.3 mmol/L    Chloride 102 98 - 107 mmol/L    Carbon Dioxide (CO2) 25 22 - 29 mmol/L    Anion Gap 11 7 - 15 mmol/L    Urea Nitrogen 16.3 8.0 - 23.0 mg/dL    Creatinine 0.65 0.51 - 0.95 mg/dL    Calcium 9.2 8.2 - 9.6 mg/dL    Glucose 135 (H) 70 - 99 mg/dL    Alkaline Phosphatase 156 (H) 35 - 104 U/L    AST 28 10 - 35 U/L    ALT 20 10 - 35 U/L    Protein Total 6.6 6.4 - 8.3 g/dL    Albumin 4.1 3.5 - 5.2 g/dL    Bilirubin Total 0.4 <=1.2 mg/dL    GFR Estimate 82 >60 mL/min/1.73m2   Magnesium     Status: Abnormal   Result Value Ref Range    Magnesium 2.5 (H) 1.7 - 2.3 mg/dL   Troponin T, High Sensitivity     Status: Normal   Result Value Ref Range    Troponin T, High Sensitivity 14 <=14 ng/L   [place] Draw     Status: None    Narrative    The following orders were created for panel order [place] Draw.  Procedure                               Abnormality         Status                     ---------                               -----------         ------                     Extra Blue Top Tube[[id number]]                              Final result               Extra Red Top Tube[[id number]]                               Final result                 Please view results for these tests on the individual orders.   CBC with platelets and differential     Status: Abnormal   Result Value Ref Range    WBC Count 4.6 4.0 - 11.0 10e3/uL    RBC Count 4.03 3.80 - 5.20 10e6/uL    Hemoglobin 12.2 11.7 - 15.7 g/dL    Hematocrit 37.0 35.0 - 47.0 %    MCV 92 78 - 100 fL    MCH 30.3 26.5 - 33.0 pg    MCHC 33.0 31.5 - 36.5 g/dL    RDW 13.9 10.0 - 15.0 %     Platelet Count 152 150 - 450 10e3/uL    % Neutrophils 75 %    % Lymphocytes 11 %    % Monocytes 12 %    % Eosinophils 2 %    % Basophils 0 %    % Immature Granulocytes 0 %    NRBCs per 100 WBC 0 <1 /100    Absolute Neutrophils 3.4 1.6 - 8.3 10e3/uL    Absolute Lymphocytes 0.5 (L) 0.8 - 5.3 10e3/uL    Absolute Monocytes 0.5 0.0 - 1.3 10e3/uL    Absolute Eosinophils 0.1 0.0 - 0.7 10e3/uL    Absolute Basophils 0.0 0.0 - 0.2 10e3/uL    Absolute Immature Granulocytes 0.0 <=0.4 10e3/uL    Absolute NRBCs 0.0 10e3/uL   Extra Blue Top Tube     Status: None   Result Value Ref Range    Hold Specimen JIC    Extra Red Top Tube     Status: None   Result Value Ref Range    Hold Specimen JIC    EKG 12-lead, tracing only     Status: None (Preliminary result)   Result Value Ref Range    Systolic Blood Pressure  mmHg    Diastolic Blood Pressure  mmHg    Ventricular Rate 128 BPM    Atrial Rate 119 BPM    AL Interval  ms    QRS Duration 80 ms     ms    QTc 470 ms    P Axis  degrees    R AXIS 50 degrees    T Axis 46 degrees    Interpretation ECG       Atrial fibrillation with rapid ventricular response with premature ventricular or aberrantly conducted complexes  Abnormal ECG     CBC with platelets differential     Status: Abnormal    Narrative    The following orders were created for panel order CBC with platelets differential.  Procedure                               Abnormality         Status                     ---------                               -----------         ------                     CBC with platelets and d...[[id number]]  Abnormal            Final result                 Please view results for these tests on the individual orders.     Medications   diltiazem (CARDIZEM) 125 mg in sodium chloride 0.9 % 125 mL infusion (15 mg/hr Intravenous Rate/Dose Change 3/21/23 1421)   iopamidol (ISOVUE-370) solution 50 mL (50 mLs Intravenous $Given 3/21/23 1352)   sodium chloride (PF) 0.9% PF flush 81 mL (81 mLs Intravenous  $Given 3/21/23 3338)     Labs Ordered and Resulted from Time of ED Arrival to Time of ED Departure   COMPREHENSIVE METABOLIC PANEL - Abnormal       Result Value    Sodium 138      Potassium 4.1      Chloride 102      Carbon Dioxide (CO2) 25      Anion Gap 11      Urea Nitrogen 16.3      Creatinine 0.65      Calcium 9.2      Glucose 135 (*)     Alkaline Phosphatase 156 (*)     AST 28      ALT 20      Protein Total 6.6      Albumin 4.1      Bilirubin Total 0.4      GFR Estimate 82     MAGNESIUM - Abnormal    Magnesium 2.5 (*)    CBC WITH PLATELETS AND DIFFERENTIAL - Abnormal    WBC Count 4.6      RBC Count 4.03      Hemoglobin 12.2      Hematocrit 37.0      MCV 92      MCH 30.3      MCHC 33.0      RDW 13.9      Platelet Count 152      % Neutrophils 75      % Lymphocytes 11      % Monocytes 12      % Eosinophils 2      % Basophils 0      % Immature Granulocytes 0      NRBCs per 100 WBC 0      Absolute Neutrophils 3.4      Absolute Lymphocytes 0.5 (*)     Absolute Monocytes 0.5      Absolute Eosinophils 0.1      Absolute Basophils 0.0      Absolute Immature Granulocytes 0.0      Absolute NRBCs 0.0     TROPONIN T, HIGH SENSITIVITY - Normal    Troponin T, High Sensitivity 14       XR Chest Port 1 View   Preliminary Result   RESIDENT PRELIMINARY INTERPRETATION   Impression:    1. Opacities of the right hilum and right upper lobe consistent with   known pulmonary mass with likely postobstructive atelectasis.   2. Perihilar and bibasilar patchy opacities which may represent   atelectasis and/or edema.   3. Stable borderline cardiomegaly.      CT Chest Pulmonary Embolism w Contrast   Preliminary Result   RESIDENT PRELIMINARY INTERPRETATION   IMPRESSION:    1.  No pulmonary embolism.   2.  Cardiomegaly with reflux of contrast into the IVC and hepatic   veins suggestive of fluid overload.   3.  Right upper lobe mass impinging on the right main bronchus causing   partial right upper lobe atelectasis.   4.  Moderate right and  small-moderate left pleural effusion with   adjacent atelectasis/consolidation.   5.  Soft tissue density of the right posterior lateral sixth rib   concerning for metastatic lesion, new since 1/20/2022   6.  Indeterminate sclerotic focus within the T6 vertebra, new since   1/20/2022, which may be related to degenerative change.      In the event of a positive result for acute pulmonary embolism   resulting in right heart strain, consider calling the    [facility] hospital  for PERT (Pulmonary Embolism Response Team)   Activation?      PERT -- Pulmonary Embolism Response Team (Multidisciplinary team   including cardiology, interventional radiology, critical care,   hematology)             Critical care was performed.   Critical Care Addendum  My initial assessment, based on my review of prehospital provider report, review of nursing observations, review of vital signs, focused history, physical exam, review of cardiac rhythm monitor and 12 lead ECG analysis, established a high suspicion that Barbi Rebolledo has a critical arrhythmia, which requires immediate intervention, and therefore she is critically ill.     After the initial assessment, the care team initiated multiple lab tests, initiated IV fluid administration and initiated medication therapy with IV diltiazem to provide stabilization care. Due to the critical nature of this patient, I reassessed nursing observations, vital signs, physical exam, review of cardiac rhythm monitor, mental status and respiratory status multiple times prior to her disposition.     Time also spent performing documentation, discussion with family to obtain medical information for decision making, reviewing test results, discussion with consultants and coordination of care.     Critical care time (excluding teaching time and procedures): 45 minutes.     Assessment & Plan      Barbi Rebolledo is a 89 year old female with a past medical history significant for metastatic lung cancer  on tagrisso maintenance chemo, atrial fibrillation, HTN who presents to the Emergency Department for evaluation of heart palpitations.  On arrival patient noted from triage to have significant elevation in pulse approaching 140 bpm.  Mild hypertension.  She is otherwise afebrile.  I did go to evaluate this patient immediately.  She is seated upright in bed and appears to be uncomfortable nontoxic.  Currently at rest SPO2 upper 90s on room air with mild evidence of increased work of breathing.  Respiratory rate 26 on my count.  I did obtain a stat EKG demonstrating atrial fibrillation with rapid ventricular response.  On telemetry heart rate is currently 120-145.  Patient was taken back to room immediately.  I did obtain IV access.  I would plan for initial rate control with diltiazem.  Otherwise certainly this patient has numerous risk for dyspnea.  Development of congestive heart failure secondary to dysrhythmia tachycardia would be possible.  Patient has had prior pericardial effusion and pleural effusion.  Less likely possible pneumonia, I would hold on antibiotics pending imaging of the chest.  Low suspicion for PE however with malignancy certainly possible.  Patient would benefit from laboratory studies, imaging of the chest, rate control for atrial fibrillation with rapid ventricular response.    On diltiazem drip patient has had decline in heart rate to 100-120.  However I did reassess with patient.  She is no longer on her anticoagulation and thus I believe not a candidate for emergent cardioversion.  With recent symptoms of intermittent palpitations I suspect she benefit from IDALMIS.  Patient reports she was taken off anticoagulation for multiple procedures including gamma knife by [facility].  Otherwise CT imaging of the chest demonstrates no obvious PE or infectious cause explain symptoms.  I believe she would benefit from acute hospitalization for rate control possibly adjustment of her outpatient oral  medication or cardioversion.  Presently blood pressure adequate on current diltiazem drip 15 mg/h.  I have reviewed the nursing notes. I have reviewed the findings, diagnosis, plan and need for follow up with the patient.    New Prescriptions    No medications on file       Final diagnoses:   Atrial fibrillation with rapid ventricular response (H)   Other fatigue   Hypoxia   Primary malignant neoplasm of lung metastatic to other site, unspecified laterality (H)       Mukesh Laws M.D.  [facility] EMERGENCY DEPARTMENT  3/21/2023     Mukesh Laws MD  03/21/23 5314

## 2023-03-21 NOTE — ED TRIAGE NOTES
Pt presents with a 2-day history of shortness of breath, heart palpitations, and hypoxia noted at home.  Pt is currently being treated for lung cancer at [facility].  Pt O2 99 on RA today.   Pt has a history of episodes of atrial fibrillation but takes no blood thinners.      Triage Assessment     Row Name 03/21/23 1149       Triage Assessment (Adult)    Airway WDL WDL       Respiratory WDL    Respiratory WDL X       Skin Circulation/Temperature WDL    Skin Circulation/Temperature WDL WDL       Cardiac WDL    Cardiac WDL X;rhythm    Pulse Rate & Regularity tachycardic       Peripheral/Neurovascular WDL    Peripheral Neurovascular WDL WDL       Cognitive/Neuro/Behavioral WDL    Cognitive/Neuro/Behavioral WDL WDL

## 2023-03-22 ENCOUNTER — APPOINTMENT (OUTPATIENT)
Dept: CARDIOLOGY | Facility: CLINIC | Age: 88
DRG: 308 | End: 2023-03-22
Attending: STUDENT IN AN ORGANIZED HEALTH CARE EDUCATION/TRAINING PROGRAM
Payer: MEDICARE

## 2023-03-22 ENCOUNTER — APPOINTMENT (OUTPATIENT)
Dept: OCCUPATIONAL THERAPY | Facility: CLINIC | Age: 88
DRG: 308 | End: 2023-03-22
Attending: STUDENT IN AN ORGANIZED HEALTH CARE EDUCATION/TRAINING PROGRAM
Payer: MEDICARE

## 2023-03-22 LAB
ALBUMIN SERPL BCG-MCNC: 3.5 G/DL (ref 3.5–5.2)
ALP SERPL-CCNC: 135 U/L (ref 35–104)
ALT SERPL W P-5'-P-CCNC: 15 U/L (ref 10–35)
ANION GAP SERPL CALCULATED.3IONS-SCNC: 8 MMOL/L (ref 7–15)
AST SERPL W P-5'-P-CCNC: 24 U/L (ref 10–35)
BASOPHILS # BLD AUTO: 0 10E3/UL (ref 0–0.2)
BASOPHILS NFR BLD AUTO: 0 %
BILIRUB SERPL-MCNC: 0.4 MG/DL
BUN SERPL-MCNC: 12.8 MG/DL (ref 8–23)
CALCIUM SERPL-MCNC: 8.6 MG/DL (ref 8.2–9.6)
CHLORIDE SERPL-SCNC: 105 MMOL/L (ref 98–107)
CREAT SERPL-MCNC: 0.62 MG/DL (ref 0.51–0.95)
DEPRECATED HCO3 PLAS-SCNC: 24 MMOL/L (ref 22–29)
EOSINOPHIL # BLD AUTO: 0.2 10E3/UL (ref 0–0.7)
EOSINOPHIL NFR BLD AUTO: 4 %
ERYTHROCYTE [DISTWIDTH] IN BLOOD BY AUTOMATED COUNT: 13.9 % (ref 10–15)
GFR SERPL CREATININE-BSD FRML MDRD: 83 ML/MIN/1.73M2
GLUCOSE SERPL-MCNC: 93 MG/DL (ref 70–99)
HCT VFR BLD AUTO: 33.6 % (ref 35–47)
HGB BLD-MCNC: 10.8 G/DL (ref 11.7–15.7)
IMM GRANULOCYTES # BLD: 0 10E3/UL
IMM GRANULOCYTES NFR BLD: 0 %
INR PPP: 1.15 (ref 0.85–1.15)
LVEF ECHO: NORMAL
LYMPHOCYTES # BLD AUTO: 0.5 10E3/UL (ref 0.8–5.3)
LYMPHOCYTES NFR BLD AUTO: 11 %
MAGNESIUM SERPL-MCNC: 2 MG/DL (ref 1.7–2.3)
MCH RBC QN AUTO: 29.8 PG (ref 26.5–33)
MCHC RBC AUTO-ENTMCNC: 32.1 G/DL (ref 31.5–36.5)
MCV RBC AUTO: 93 FL (ref 78–100)
MONOCYTES # BLD AUTO: 0.5 10E3/UL (ref 0–1.3)
MONOCYTES NFR BLD AUTO: 11 %
NEUTROPHILS # BLD AUTO: 3.4 10E3/UL (ref 1.6–8.3)
NEUTROPHILS NFR BLD AUTO: 74 %
NRBC # BLD AUTO: 0 10E3/UL
NRBC BLD AUTO-RTO: 0 /100
PLATELET # BLD AUTO: 162 10E3/UL (ref 150–450)
POTASSIUM SERPL-SCNC: 3.9 MMOL/L (ref 3.4–5.3)
PROT SERPL-MCNC: 5.9 G/DL (ref 6.4–8.3)
RBC # BLD AUTO: 3.62 10E6/UL (ref 3.8–5.2)
SARS-COV-2 RNA RESP QL NAA+PROBE: NEGATIVE
SODIUM SERPL-SCNC: 137 MMOL/L (ref 136–145)
WBC # BLD AUTO: 4.6 10E3/UL (ref 4–11)

## 2023-03-22 PROCEDURE — 258N000003 HC RX IP 258 OP 636: Performed by: STUDENT IN AN ORGANIZED HEALTH CARE EDUCATION/TRAINING PROGRAM

## 2023-03-22 PROCEDURE — 97165 OT EVAL LOW COMPLEX 30 MIN: CPT | Mod: GO

## 2023-03-22 PROCEDURE — 80053 COMPREHEN METABOLIC PANEL: CPT | Performed by: STUDENT IN AN ORGANIZED HEALTH CARE EDUCATION/TRAINING PROGRAM

## 2023-03-22 PROCEDURE — 99232 SBSQ HOSP IP/OBS MODERATE 35: CPT | Mod: GC | Performed by: STUDENT IN AN ORGANIZED HEALTH CARE EDUCATION/TRAINING PROGRAM

## 2023-03-22 PROCEDURE — 999N000208 ECHOCARDIOGRAM COMPLETE

## 2023-03-22 PROCEDURE — 85025 COMPLETE CBC W/AUTO DIFF WBC: CPT | Performed by: STUDENT IN AN ORGANIZED HEALTH CARE EDUCATION/TRAINING PROGRAM

## 2023-03-22 PROCEDURE — 83735 ASSAY OF MAGNESIUM: CPT

## 2023-03-22 PROCEDURE — 97535 SELF CARE MNGMENT TRAINING: CPT | Mod: GO

## 2023-03-22 PROCEDURE — 214N000001 HC R&B CCU UMMC

## 2023-03-22 PROCEDURE — 250N000009 HC RX 250: Performed by: STUDENT IN AN ORGANIZED HEALTH CARE EDUCATION/TRAINING PROGRAM

## 2023-03-22 PROCEDURE — 97530 THERAPEUTIC ACTIVITIES: CPT | Mod: GO

## 2023-03-22 PROCEDURE — U0003 INFECTIOUS AGENT DETECTION BY NUCLEIC ACID (DNA OR RNA); SEVERE ACUTE RESPIRATORY SYNDROME CORONAVIRUS 2 (SARS-COV-2) (CORONAVIRUS DISEASE [COVID-19]), AMPLIFIED PROBE TECHNIQUE, MAKING USE OF HIGH THROUGHPUT TECHNOLOGIES AS DESCRIBED BY CMS-2020-01-R: HCPCS

## 2023-03-22 PROCEDURE — 999N000128 HC STATISTIC PERIPHERAL IV START W/O US GUIDANCE

## 2023-03-22 PROCEDURE — 250N000013 HC RX MED GY IP 250 OP 250 PS 637: Performed by: STUDENT IN AN ORGANIZED HEALTH CARE EDUCATION/TRAINING PROGRAM

## 2023-03-22 PROCEDURE — 93306 TTE W/DOPPLER COMPLETE: CPT | Mod: 26 | Performed by: INTERNAL MEDICINE

## 2023-03-22 PROCEDURE — 85610 PROTHROMBIN TIME: CPT | Performed by: STUDENT IN AN ORGANIZED HEALTH CARE EDUCATION/TRAINING PROGRAM

## 2023-03-22 PROCEDURE — 99222 1ST HOSP IP/OBS MODERATE 55: CPT | Mod: 24 | Performed by: INTERNAL MEDICINE

## 2023-03-22 PROCEDURE — 36415 COLL VENOUS BLD VENIPUNCTURE: CPT | Performed by: STUDENT IN AN ORGANIZED HEALTH CARE EDUCATION/TRAINING PROGRAM

## 2023-03-22 PROCEDURE — 255N000002 HC RX 255 OP 636: Performed by: INTERNAL MEDICINE

## 2023-03-22 PROCEDURE — 999N000147 HC STATISTIC PT IP EVAL DEFER

## 2023-03-22 RX ADMIN — HUMAN ALBUMIN MICROSPHERES AND PERFLUTREN 5 ML: 10; .22 INJECTION, SOLUTION INTRAVENOUS at 09:02

## 2023-03-22 RX ADMIN — DILTIAZEM HYDROCHLORIDE 15 MG/HR: 5 INJECTION INTRAVENOUS at 12:55

## 2023-03-22 RX ADMIN — DILTIAZEM HYDROCHLORIDE 15 MG/HR: 5 INJECTION INTRAVENOUS at 05:02

## 2023-03-22 RX ADMIN — DILTIAZEM HYDROCHLORIDE 15 MG/HR: 5 INJECTION INTRAVENOUS at 21:12

## 2023-03-22 RX ADMIN — DILTIAZEM HYDROCHLORIDE 180 MG: 180 CAPSULE, COATED, EXTENDED RELEASE ORAL at 08:36

## 2023-03-22 ASSESSMENT — ACTIVITIES OF DAILY LIVING (ADL)
ADLS_ACUITY_SCORE: 24
ADLS_ACUITY_SCORE: 22

## 2023-03-22 NOTE — PHARMACY-ADMISSION MEDICATION HISTORY
Admission Medication History Completed by Pharmacy    See [facility] Admission Navigator for allergy information, preferred outpatient pharmacy, prior to admission medications and immunization status.     Medication History Sources:     Patient interview - family members at bedside    Fill history    Changes made to PTA medication list (reason):    Added: None    Deleted:   o apixaban (stopped >1 year ago by [facility] because they needed to hold it so often for procedures)  o Desonide  o Lidocaine-epinephrine injection  o Mupirocin  o Potassium chloride  o Sodium fluoride dental gel    Changed: None    Additional Information:    Patient voiced concern about going back on apixaban because of planned procedures at [facility]    Prior to Admission medications    Medication Sig Last Dose Taking? Auth Provider Long Term End Date   diltiazem ER (DILT-XR) 180 MG 24 hr capsule Take 1 capsule (180 mg) by mouth daily   Chandni Bains MD Yes    emollient (VANICREAM) cream Apply topically daily to entire body, especially after bathing.  Patient not taking: Reported on 2/15/2023   Alfredito Ash MD     osimertinib (TAGRISSO) 80 MG tablet Take 80 mg by mouth daily   Reported, Patient         Date completed: 03/22/23    Medication history completed by:     Douglas Marin, PharmD  PGY1 Pharmacist Resident

## 2023-03-22 NOTE — PLAN OF CARE
Goal Outcome Evaluation:      Plan of Care Reviewed With: patient, family    Overall Patient Progress: no changeOverall Patient Progress: no change    Outcome Evaluation: Start sending ONS to aide intake. See RD note.

## 2023-03-22 NOTE — PROGRESS NOTES
[facility]    Progress Note - Medicine Service, [facility] TEAM 1       Date of Admission:  3/21/2023    Assessment & Plan   Barbi Rebolledo is a 89 year old female admitted on 3/21/2023. 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      Today:  - EP consult, considering cardioversion   - Wean oxygen   - TTE pending     # Afib with RVR  # Shortness of breath  Hypoxic at home and presented in Afib with RVR with rates in the 140s. Trigger of this episode unclear, electrolytes normal and no infectious symptoms, taking diltiazem as prescribed. Takes osimertinib for cancer which can have QT prolonging effects but does not appear to be associated with Afib. Cancer may be contributing. CT PE negative. Started on diltiazem gtt in ED with improvement in symptoms. Has not been on anticoagulation for approximately 1 year.   - Continue diltiazem gtt  - Diltiazem 180mg daily   - TTE pending   - Monitor electrolytes   - EP consulted, appreciate recs   - Considering IDALMIS/cardioversion tomorrow if she can continue anticoagulation for 2-4 weeks          # Metastatic lung adenocarcinoma   Follows at [facility].  - PTA osimertinib          Diet: Regular Diet Adult    DVT Prophylaxis: Pneumatic Compression Devices  Johnson Catheter: Not present  Fluids: none  Lines: None     Cardiac Monitoring: ACTIVE order. Indication: Tachyarrhythmias, acute (48 hours)  Code Status: Full Code      Clinically Significant Risk Factors Present on Admission               # Drug Induced Coagulation Defect: home medication list includes an anticoagulant medication          # Moderate Malnutrition: based on nutrition assessment         Disposition Plan      Expected Discharge Date: 03/23/2023                The patient's care was discussed with the Attending Physician, Dr. Webber.    Justine Winston MD  Medicine Service, MARVA  TEAM 1  [facility]  Securely message with [employer] (more info)  Text page via [employer] Paging/Directory   See signed in provider for up to date coverage information  ______________________________________________________________________    Interval History   NAEO. Feeling well this morning. Heart not racing. Was started on 2L NC.     Physical Exam   Vital Signs: Temp: 97.8  F (36.6  C) Temp src: Oral BP: 119/74 Pulse: 80   Resp: 20 SpO2: 93 % O2 Device: Nasal cannula Oxygen Delivery: 2 LPM  Weight: 105 lbs 12.8 oz    General Appearance: Well appearing, NAD  Respiratory: CTAB  Cardiovascular: irregularly irregular   GI: soft, NT, ND      Medical Decision Making       Data     I have personally reviewed the following data over the past 24 hrs:    4.6  \   10.8 (L)   / 162     137 105 12.8 /  93   3.9 24 0.62 \       ALT: 15 AST: 24 AP: 135 (H) TBILI: 0.4   ALB: 3.5 TOT PROTEIN: 5.9 (L) LIPASE: N/A       Trop: 14 BNP: N/A       TSH: 1.46 T4: N/A A1C: N/A       INR:  1.15 PTT:  N/A   D-dimer:  N/A Fibrinogen:  N/A

## 2023-03-22 NOTE — PROGRESS NOTES
"CLINICAL NUTRITION SERVICES - ASSESSMENT NOTE     Nutrition Prescription    RECOMMENDATIONS FOR MDs/PROVIDERS TO ORDER:  None at present    Malnutrition Status:    Moderate malnutrition in the context of chronic illness    Recommendations already ordered by Registered Dietitian (RD):  + Ensure Enlive BID with breakfast and dinner, vanilla flavor  + Gelatin Plus once daily @ 2 PM, cherry flavor (with whip cream)    Future/Additional Recommendations:  Monitor PO intake adequacy, ONS acceptance  Monitor weight trends       REASON FOR ASSESSMENT  Barbi Rebolledo is a/an 89 year old female assessed by the dietitian for Admission Nutrition Risk Screen for MST 5: unintentional weight loss (34#) and poor appetite.    PMHx: Metastatic lung adenocarcinoma (mets to brain and liver), HTN, paroxysmal Afib, TIA who is admitted for Afib with RVR. Pt currently takes chemotherapy tagrisso.    NUTRITION HISTORY  Per nursing, patient stated eating and drinking adequately PTA. During visit, family and patient present in room. Pt stated often eating 3 meals per day with an occasional snack (breakfast protein shake w/ protein powder + greek yogurt + orange juice + fruit, egg and toast; lunch and dinner meat and potatoes, veggies). Family confirmed this intake PTA. Pt stated having a good appetite and was eager for diet to advance. Discussed ONS to aide intake, pt agreed.    CURRENT NUTRITION ORDERS  Diet: NPO  Intake/Tolerance: NPO since admission    LABS  Labs reviewed  Alk phos 135 (H)    MEDICATIONS  Medications reviewed  Tagrisso daily    ANTHROPOMETRICS  Height: 156.2 cm (5' 1.5\")  Most Recent Weight: 48 kg (105 lb 12.8 oz)    IBW: 50 kg  BMI: Normal BMI  Weight History: Standing scale weight taken today 3/22 at 48 kg (wearing gown). Per care everywhere, pt has weight hx of 49 kg on 12/8/22 and 44.5 kg on 3/14/22. No weight loss indicated per weight history.    Dosing Weight: 48 kg (actual scaled weight)    ASSESSED NUTRITION " NEEDS  Estimated Energy Needs: 5010-6438 kcals/day (30 - 35 kcals/kg )  Justification: Increased needs - on chemo  Estimated Protein Needs: 58-72 grams protein/day (1.2 - 1.5 grams of pro/kg)  Justification: Increased needs - on chemo  Estimated Fluid Needs: 3632-1980 mL/day (1 mL/kcal)   Justification: Maintenance    PHYSICAL FINDINGS  See malnutrition section below.    GI: last BM 3/21, bowel regimen PRN. Stated having loose stool PTA. Denies N/V.    MALNUTRITION  % Intake: Decreased intake does not meet criteria  % Weight Loss: Weight loss does not meet criteria  Subcutaneous Fat Loss: Facial region:  mild, Upper arm:  moderate and Lower arm:  moderate  Muscle Loss: Facial & jaw region:  mild, Thoracic region (clavicle, acromium bone, deltoid, trapezius, pectoral):  moderate, Upper arm (bicep, tricep):  moderate and Lower arm  (forearm):  moderate  Fluid Accumulation/Edema: None noted  Malnutrition Diagnosis: Moderate malnutrition in the context of chronic illness    NUTRITION DIAGNOSIS  Inadequate protein-energy intake related to increased nutrient needs while on chemotherapy as evidenced by moderate subcutaneous fat and muscle loss, and need for supplemental nutrition to meet estimated nutrient needs.    INTERVENTIONS  Implementation  Nutrition Education: Role of RD  Medical food supplement therapy     Goals  Patient to consume % of nutritionally adequate meal trays TID, or the equivalent with supplements/snacks.     Monitoring/Evaluation  Progress toward goals will be monitored and evaluated per protocol.    Ade Santiago, Dietitian Eligible      I have read the above assessment and agree with the evaluation and implementation.  Jaime Shea RD/SAGAR  Pager [phone]

## 2023-03-22 NOTE — PLAN OF CARE
Physical Therapy: Orders received and appreciated. Chart reviewed and discussed with care team.? Physical Therapy not indicated due to lack of mobility deficits.? Defer discharge recommendations to OT.? Will complete orders.

## 2023-03-22 NOTE — PLAN OF CARE
"/74 (BP Location: Left arm)   Pulse 91   Temp 98.2  F (36.8  C) (Oral)   Resp 20   Ht 1.562 m (5' 1.5\")   Wt 48 kg (105 lb 12.8 oz)   SpO2 91%   BMI 19.67 kg/m       Dx afib + RVR.    Hx includes paroxysmal afib, metastatic lung adenocarcinoma, HTN, and TIA.    AO X 4, VSS, afib HR 80's - 90's, afebrile, IV and oral dilt, echocardiogram, NPO at midnight for IDALMIS / cardioversion. 1-2 L NC baseline. PT / OT consulting. SBA.  "

## 2023-03-22 NOTE — PROGRESS NOTES
Admission           -----------------------------------------------------------  Admitted from:ED  For: Afib RVR  Report given from:ED RN   Via: Cart  Family Aware of Admission: Yes  Medications: To be given to pharmacy to be relabeled   Chart: Arrived with patient  Belongings: Clothing, shoes, cell phone, , rings, medication and purse. Belongings will remain in pt room, declines security.  Admission Profile: Complete  Teaching: Educated pt on call light use, call don't fall, visiting hours, meal times and plan of care.  Access: PIV  Telemetry: placed on patient   Ht./Wt.: complete  2 RN Skin Check: completed with Bre ARNOLD RN. Findings including cut on right thumb.

## 2023-03-22 NOTE — PROGRESS NOTES
Hours of Care:  1900 - 0700    Temp:  [97.9  F (36.6  C)-98.9  F (37.2  C)] 97.9  F (36.6  C)  Pulse:  [] 102  Resp:  [15-26] 23  BP: (106-150)/() 118/79  SpO2:  [91 %-99 %] 94 %     D: Barbi Rebolledo is a 89yo female with a history of paroxysmal afib (chadsvasc of 6 previously on DOAC), metastatic lung adenocarcinoma, HTN, and TIA admitted 3/21 with afib + RVR     I: Monitored vitals and assessed pt status.   Running: Diltizem 15ml/hr  Tele: Afib 70s-100  O2: 2L  Mobility: SBA    A: Neuro: A/O x4 .  Call light appropriate.  Able to make needs known.  Respiratory:  2 L NC,   Lung sounds clear LISA, dim BLL. DYE  Cardiac: Afib     GI: Last BM 3/21/23.  No report of nausea or vomiting.  : Urinating adequate amounts of clear, yellow urine  LDA:  R. PIV forearm  Pain: Denies  Isolation:  Standard Precautions  Tests/procedures: covid negative  Diet: Low sat fat NA < 2400mg  Sleep:  No sleep disturbances noted or reported.  P: Continue to monitor Pt status and report changes to Primary team

## 2023-03-22 NOTE — PLAN OF CARE
Temp:  [97.9  F (36.6  C)-98.9  F (37.2  C)] 97.9  F (36.6  C)  Pulse:  [] 102  Resp:  [15-26] 23  BP: (106-150)/() 118/79  SpO2:  [91 %-99 %] 94 %     D: Barbi Rbeolledo is a 89yo female with a history of paroxysmal afib (chadsvasc of 6 previously on DOAC), metastatic lung adenocarcinoma, HTN, and TIA admitted 3/21 with afib + RVR.  She acutely developed palpitations and dyspnea earlier in the day.  Whereas previously these episodes would resolve after taking her p.o. diltiazem at this time it did not so she came into the emergency department.  She is currently feeling improved but continues to have palpitations.     I: Monitored vitals and assessed pt status.   Running: Diltizem 15ml/hr  Tele: ST  O2: 2L  Mobility: SBA     A: Neuro: A/O x4 .  Call light appropriate.  Able to make needs known.  Respiratory:  2 L  Lung sounds clear LISA, dim BLL. DYE  Cardiac: Afib     GI: Last BM 3/21/23.  No report of nausea or vomiting.  : Urinating adequate amounts of clear, yellow urine  LDA:  R. PIV forearm  Pain: Denies  Isolation:  Standard Precautions  Tests/procedures: covid negative  Diet: Low sat fat NA < 2400mg  Sleep:  No sleep disturbances noted or reported.  P: Continue to monitor Pt status and report changes to Primary team

## 2023-03-22 NOTE — CONSULTS
"      CARDIOLOGY CONSULT NOTE    Reason for consult: \"89F with atrial fibrillation with RVR on diltiazem with palpitations since 3/21 AM, any indication for cardioversion?\"       Date of Service: 03/22/23    SUMMARY:  Patient is a 89-year-old female with a history of stage IV adenocarcinoma of right lung, paroxysmal atrial fibrillation ([id number], no longer on anticoagulation since Feb/March 2022 due to recurrent hemoptysis), pericardial effusion s/p pericardiocentesis 2/14/2022 (cytology negative), hypertension and TIA who was admitted on 03/21/2023 for atrial fibrillation with rapid ventricular response. EP consulted to assist with management and advise on possible cardioversion.     IMPRESSION:  1. Paroxysmal atrial fibrillation with rapid ventricular response ([id number] 6; Age, sex, HTN, TIA): In the setting of advanced age, left atrial enlargement (CAMILA 62 ml/m2), hypertension and underlying metastatic cancer.  No clear precipitant of recent episode of RVR; no signs of intravascular volume depletion, electrolyte derangement, anemia, recent illness, etc. TSH is within normal limits. She has been taking diltiazem 180 mg daily without interruption.  Of note, she has been on higher doses of diltiazem in the past (300-360 mg daily).  In terms of management of her atrial fibrillation, one option would be to optimize rate control by increasing the dose of the diltiazem.  This option  would avoid initiation of anticoagulation (given possible upcoming Gamma knife radiation and history of hemoptysis), although acknowledging that the patient is at increased risk of stroke given her high [id number] score.  The other option would be to proceed with IDALMIS guided DCCV to restore normal sinus rhythm.  For this option, patient would have to be on anticoagulation after cardioversion due to myocardial stunning and increased stroke risk.  Optimal duration for anticoagulation post cardioversion is 30 days, although the stroke " risk is highest in the first 24 to 48 hours.  For this reason, we would be comfortable with a minimum anticoagulation duration of 2 weeks, in an effort to minimize disruptions to patient's oncology plan.  Patient would also be started on low-dose flecainide post cardioversion to increase the likelihood that she remains in normal sinus rhythm, especially given her advanced age and enlarged left atrium.    2. Mildly reduced LVEF, 45-50% on 03/22/2023 TTE, compared to 61% by biplane in 10/2022. Suspect due to afib RVR.   3. Pericardial effusion s/p pericardiocentesis 3/14/2022  4. Hypertension     RECOMMENDATIONS:  -After discussion with patient and family, she would like to proceed with IDALMIS guided DCCV.  We will try to arrange this for tomorrow 3/23.   - Please make NPO at midnight.   -We will start apixaban 2.5 mg twice daily post cardioversion, plan for minimum duration of 2 weeks.  Can extend duration beyond 2 weeks and up to 4 weeks if no immediate plan for procedures per oncology at patient's next visit on 3/31.  Will also need to monitor for recurrence of hemoptysis (last episode was in 02/2022)  -We will also start flecainide 50 mg twice daily post cardioversion to increase likelihood that she remains in normal sinus rhythm afterwards. Okay to use low-dose flecainide with Osimertinib.   -Will continue PTA diltiazem 180 mg daily alongside flecainide, since flecainide needs to be taken in conjunction with an AV khurram blocking agent (to mitigate potential atrial proarrhythmic effects)   -Continue diltiazem drip until cardioversion tomorrow       Discussed with attending, Dr. Duvall    Thank you for consulting the cardiovascular services at the [facility]. Please do not hesitate to call us with any questions.     Kassidy Pereira MD  Cardiology Fellow  [phone]    =====================================================  HPI:  Patient is a 89-year-old female with a history of stage IV  adenocarcinoma of right lung, paroxysmal atrial fibrillation ([id number] 6, no longer on anticoagulation since Feb 2022 due to recurrent hemoptysis), pericardial effusion s/p pericardiocentesis 2/14/2022 (cytology negative), hypertension and TIA who was admitted on 03/21/2023  for atrial fibrillation with rapid ventricular response. She developed acute palpitations and dyspnea in the 24 hours prior to admission, which was characteristic of her previous afib episodes. She takes diltiazem 180 mg daily and had not missed any doses prior to admission.  Upon presentation to emergency department, she was hemodynamically stable with monitor showing afib RVR with rates in the 130s. Afib RVR confirmed on EKG. Started on diltiazem drip, currently running at 15 mg/hr. Also continued on PO diltiazem 180 mg daily. HR this AM is in the 70-100s. EP consulted to assist with management and advise on possible cardioversion.     Regarding her atrial fibrillation history, diagnosis was first made in 2018, with recurrent episodes since then, most recently after her pericardiocentesis in February 2022. She spontaneously converted to NSR at that time and dose of diltiazem was kept at 180 mg daily.  She also had an episode of A-fib RVR in October 2021 after her diltiazem was held for about a month.  Patient has been off anticoagulation since February 2022 due to recurrent hemoptysis and anticipated procedures for the treatment of her metastatic cancer (liver biopsy, gamma knife radiation of brain metastases, etc.).  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  She has an upcoming appointment with her oncologist to discuss possible repeat gamma knife radiation.    On interview, she reports that she feels much better compared to yesterday when her heart rate was in the 130s.  She denies palpitations or shortness of breath per se, just feels like she is not quite  back to her baseline.  Denies chest pain or lightheadedness.    Cardiac medications: Diltiazem 180 mg daily    PAST MEDICAL HISTORY:  Past Medical History:   Diagnosis Date     Squamous cell carcinoma        CURRENT MEDICATIONS:  No current outpatient medications on file.       ALLERGIES     Allergies   Allergen Reactions     Fentanyl Other (See Comments)     Sedation     Lisinopril Swelling     Swelling of tongue     Novocain [Procaine] Other (See Comments)     Tachycardia       FAMILY HISTORY:  Family History   Problem Relation Age of Onset     Cancer No family hx of         no skin cancer     Skin Cancer No family hx of         no skin cancer       SOCIAL HISTORY:  Social History     Socioeconomic History     Marital status: Single     Spouse name: None     Number of children: None     Years of education: None     Highest education level: None   Tobacco Use     Smoking status: Never     Smokeless tobacco: Never       Review of Systems:   10-point comprehensive review of system negative besides what's noted in HPI.     Physical Exam   Temp: 98.2  F (36.8  C) Temp src: Oral BP: 108/74 Pulse: 91   Resp: 20 SpO2: 94 % O2 Device: Nasal cannula Oxygen Delivery: 2 LPM  Vital Signs with Ranges  Temp:  [97.9  F (36.6  C)-98.9  F (37.2  C)] 98.2  F (36.8  C)  Pulse:  [] 91  Resp:  [15-26] 20  BP: (106-150)/() 108/74  SpO2:  [91 %-99 %] 94 %  105 lbs 12.8 oz    GEN: No acute distress   HEENT: EOMI, no icterus, moist mucous membranes  CV: Irregularly irregular, no murmurs. JVP not elevated   CHEST: Normal work of breathing. Lungs CTAB, no wheezes or crackles.   ABD: Soft, NT/ND   EXT: Warm and well-perfused, No LE edema  NEURO: Awake, alert, answering questions appropriately, motor grossly nonfocal  SKIN: No worrisome lesions  PSYCH: cooperative, affect appropriate    LABS:  Recent Labs   Lab 03/22/23  0511 03/21/23  1223   WBC 4.6 4.6   HGB 10.8* 12.2   MCV 93 92    152   INR 1.15  --     138    POTASSIUM 3.9 4.1   CHLORIDE 105 102   CO2 24 25   BUN 12.8 16.3   CR 0.62 0.65   ANIONGAP 8 11   DEANA 8.6 9.2   GLC 93 135*   ALBUMIN 3.5 4.1   PROTTOTAL 5.9* 6.6   BILITOTAL 0.4 0.4   ALKPHOS 135* 156*   ALT 15 20   AST 24 28     2023 EKG:      Pertinent cardiac testin2023 TTE:  Interpretation Summary  Left ventricular function is decreased. The ejection fraction is 45-50%  (mildly reduced).  Global right ventricular function is mildly reduced.  Moderate mitral insufficiency is present.  Mild to moderate tricuspid insufficiency is present.  Estimated mean right atrial pressure is elevated ~15 mmHg  No pericardial effusion is present.    10/28/2022 TTE:   Final Impressions  1. Borderline enlarged left ventricular chamber size, no regional wall motion abnormalities, calculated 2-D biplane volumetric ejection fraction 61%.  2. Normal right ventricular chamber size, normal systolic function, estimated right ventricular systolic pressure 34 mmHg (right atrial pressure of 5  mmHg).  3. Thickened mitral valve. Slight leaflet over-ride due to posterior leaflet tethering. Moderate, posteriorly-directed mitral valve regurgitation (multiple  jets).  4. No pericardial effusion.    Findings  Echocardiogram performed per left ventricular function protocol. Last full echocardiogram performed 2022.  LEFT VENTRICLE: Borderline enlarged left ventricular chamber size. Calculated 2-D biplane volumetric left ventricular ejection fraction  61%. Global averaged left ventricular longitudinal peak systolic strain is normal at -21% (normal = more negative than -18%). No regional wall  motion abnormalities. Indeterminate left ventricular filling pressure.  RIGHT VENTRICLE: Normal right ventricular chamber size. Normal right ventricular systolic function. Estimated right ventricular systolic pressure  34 mmHg (right atrial pressure of 5 mmHg).  ATRIA: Enlarged left atrial size by visual estimate. Normal right atrial  size by visual estimate.  CARDIAC VALVES: Sclerotic aortic valve. Trivial aortic valve regurgitation. Thickened mitral valve. Slight leaflet over-ride due to posterior leaflet  tethering. Posteriorly-directed mitral valve regurgitation. Moderate mitral valve regurgitation (multiple jets). Normal tricuspid valve. Mild tricuspid  valve regurgitation.  OTHER ECHO FINDINGS: Normal inferior vena cava size with normal inspiratory collapse (>50%). No pericardial effusion.    02/11/2022 TTE:  Final Impressions  1. Moderate circumferential pericardial effusion with early features of cardiac tamponade. In particular, there is systolic right atrial  compression with subtle ventricular interdependence. However there is no hemodynamically significant mitral inflow variation or expiratory  hepatic diastolic flow reversals (inspiratory diastolic flow reversals noted).  2. Borderline enlarged inferior vena cava size with normal inspiratory collapse (>50%) , consistent with mildly elevated central venous  pressure.  3. Normal left ventricular chamber size. Calculated left ventricular ejection fraction 54 %. No regional wall motion abnormalities.  4. Mildly reduced right ventricular systolic function. Normal right ventricular chamber size. Estimated right ventricular systolic pressure 38  mmHg (right atrial pressure of 10 mmHg).  5. No hemodynamically significant valvular heart disease.    Findings  There are no previous [facility] echocardiograms available for comparison. Emergency communication to Dr. FATEMEH Ray and Dr. ALFREDO Chavarria at 11-Feb-2022 8:50 AM regarding the critical echocardiography results was completed and acknowledged.  LEFT VENTRICLE: Normal left ventricular chamber size. Normal left ventricular geometry. Calculated 2-D biplane volumetric left  ventricular ejection fraction 54%. No regional wall motion abnormalities. Indeterminate left ventricular filling pressure.  RIGHT VENTRICLE: Normal right ventricular chamber  size. Mildly reduced right ventricular systolic function. Estimated right ventricular  systolic pressure 38 mmHg (right atrial pressure of 10 mmHg).  ATRIA: Moderately enlarged left atrial size. Left atrial volume index 46 ml/m2. Normal right atrial size by visual estimate.  CARDIAC VALVES: Trileaflet aortic valve. Sclerotic aortic valve. Trivial aortic valve regurgitation. Thickened mitral valve. Mild mitral valve  regurgitation. Normal pulmonary valve. Normal pulmonary valve systolic velocities. Trivial pulmonary valve regurgitation. Normal tricuspid  valve. Mild tricuspid valve regurgitation.  OTHER ECHO FINDINGS: Borderline enlarged inferior vena cava size with normal inspiratory collapse (>50%). Normal proximal  ascending aorta diameter (diameter 33 mm at proximal level). No abdominal aortic aneurysm. Normal abdominal aorta Doppler flow  pattern. No atrial level shunt by color flow imaging. No intracardiac mass or thrombus, but the left atrial appendage cannot be visualized  adequately with transthoracic echo to exclude thrombus in this location. Moderate circumferential pericardial effusion. Small left pleural  Effusion.    I very much appreciated the opportunity to assess Barbi Rebolledo in the hospital with CV Fellow Dr Pereira.  Together we reviewed several potential treatment strategies given the patient's age and comorbidities.  In the end it seemed reasonable to try and restore sinus rhythm by cardioversion and to provide early anticoagulation coverage.  His anticipated that the latter may need to be terminated early due to other therapeutic interventions.  However the greatest risk post cardioversion is generally in the first 24 to 48 hours.  Consequently anticoagulation coverage should be arranged for that time and then continued until such time as determination is needed for safe introduction of other therapies.  The note above summarizes my findings and current recommendations.  Please do not hesitate  to contact my office if you have any questions or concerns.      Aldo Duvall MD  Cardiac Arrhythmia Service  [facility]  [phone]

## 2023-03-22 NOTE — PROGRESS NOTES
"   03/22/23 1100   Appointment Info   Signing Clinician's Name / Credentials (OT) Barbara Landen Lay,OTD, OTR/L   Living Environment   People in Home child(víctor), adult   Current Living Arrangements house   Home Accessibility stairs to enter home;stairs within home   Number of Stairs, Main Entrance 2   Stair Railings, Main Entrance railings safe and in good condition   Number of Stairs, Within Home, Primary greater than 10 stairs   Stair Railings, Within Home, Primary railings safe and in good condition   Transportation Anticipated family or friend will provide   Living Environment Comments Pt lives in 2L home with 2 DEMETRIUS. 2 adult sons in home and 1 next door (someone is always home). Has tub shower and WIS, GBs and chair. Can stay on 1 level if needed.   Self-Care   Usual Activity Tolerance good   Current Activity Tolerance moderate   Equipment Currently Used at Home none   Fall history within last six months no   Activity/Exercise/Self-Care Comment IND with I/ADLs, no AD use, no recent falls. 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   Instrumental Activities of Daily Living (IADL)   IADL Comments shares with sons, IND   General Information   Onset of Illness/Injury or Date of Surgery 03/21/23   Referring Physician Alondra Coreas MD   Additional Occupational Profile Info/Pertinent History of Current Problem \"Barbi Rebolledo is a 89 year old female admitted on 3/21/2023. 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), HTN, paroxysmal Afib, TIA who is admitted for Afib with RVR.\"   Existing Precautions/Restrictions fall;cardiac   General Observations and Info up ad babak   Cognitive Status Examination   Orientation Status orientation to person, place and time   Affect/Mental Status (Cognitive) WNL   Follows Commands WNL   Visual Perception   Visual Impairment/Limitations WNL   Sensory   Sensory Quick Adds sensation intact   Pain Assessment "   Patient Currently in Pain No   Range of Motion Comprehensive   General Range of Motion no range of motion deficits identified   Strength Comprehensive (MMT)   Comment, General Manual Muscle Testing (MMT) Assessment slight deconditioning   Coordination   Upper Extremity Coordination No deficits were identified   Bed Mobility   Bed Mobility supine-sit   Supine-Sit [place] (Bed Mobility) modified independence   Assistive Device (Bed Mobility) bed rails   Transfers   Transfers sit-stand transfer   Sit-Stand Transfer   Sit-Stand [place] (Transfers) contact guard   Activities of Daily Living   BADL Assessment/Intervention lower body dressing;toileting;upper body dressing   Upper Body Dressing Assessment/Training   Comment, (Upper Body Dressing) anticipate SBA per clinical judgement   Lower Body Dressing Assessment/Training   Comment, (Lower Body Dressing) anticipate SBA per clinical judgement   Toileting   Comment, (Toileting) Anticipate SBA per clinical judgement   Clinical Impression   Criteria for Skilled Therapeutic Interventions Met (OT) Yes, treatment indicated   OT Diagnosis slight deconditioning, dec IND with I/ADLs   OT Problem List-Impairments impacting ADL problems related to;activity tolerance impaired;strength   Assessment of Occupational Performance 1-3 Performance Deficits   Identified Performance Deficits bathing, transfers, dressing   Planned Therapy Interventions (OT) ADL retraining;IADL retraining;strengthening;transfer training;home program guidelines;progressive activity/exercise;risk factor education   Clinical Decision Making Complexity (OT) low complexity   Anticipated Equipment Needs Upon Discharge (OT)   (TBD)   Risk & Benefits of therapy have been explained evaluation/treatment results reviewed;care plan/treatment goals reviewed;risks/benefits reviewed;current/potential barriers reviewed;participants voiced agreement with care plan;participants included;patient   OT Total Evaluation  Time   OT Eval, Low Complexity Minutes (41337) 4   OT Goals   Therapy Frequency (OT) 4 times/wk   OT Predicted Duration/Target Date for Goal Attainment 04/05/23   OT Goals Upper Body Dressing;Lower Body Dressing;Toilet Transfer/Toileting;OT Goal 1;OT Goal 2   OT: Upper Body Dressing Independent;including set-up/clothing retrieval   OT: Lower Body Dressing Independent;including set-up/clothing retrieval   OT: Toilet Transfer/Toileting Independent;cleaning and garment management;toilet transfer   OT: Goal 1 pt will IND complete 10+ stairs to simulate home setup   OT: Goal 2 pt will IND complete tub shower transfer to simulate home setup  (also has WIS that can be used, pt prefers tub shower)   OT Discharge Planning   OT Discharge Recommendation (DC Rec) home with assist   OT Rationale for DC Rec pt presenting slightly below baseline limited by slight deconditioning and dec act martín, anticipate with continued IP therapy, pt will be safe to d/c home with assist as needed.   OT Brief overview of current status SBA-CGA with ambulation, no AD   Total Session Time   Total Session Time (sum of timed and untimed services) 4

## 2023-03-23 ENCOUNTER — APPOINTMENT (OUTPATIENT)
Dept: GENERAL RADIOLOGY | Facility: CLINIC | Age: 88
DRG: 308 | End: 2023-03-23
Payer: MEDICARE

## 2023-03-23 LAB
ANION GAP SERPL CALCULATED.3IONS-SCNC: 9 MMOL/L (ref 7–15)
BUN SERPL-MCNC: 20.9 MG/DL (ref 8–23)
CALCIUM SERPL-MCNC: 8.7 MG/DL (ref 8.2–9.6)
CHLORIDE SERPL-SCNC: 104 MMOL/L (ref 98–107)
CREAT SERPL-MCNC: 0.77 MG/DL (ref 0.51–0.95)
DEPRECATED HCO3 PLAS-SCNC: 23 MMOL/L (ref 22–29)
ERYTHROCYTE [DISTWIDTH] IN BLOOD BY AUTOMATED COUNT: 13.9 % (ref 10–15)
GFR SERPL CREATININE-BSD FRML MDRD: 72 ML/MIN/1.73M2
GLUCOSE SERPL-MCNC: 105 MG/DL (ref 70–99)
HCT VFR BLD AUTO: 33.3 % (ref 35–47)
HGB BLD-MCNC: 10.8 G/DL (ref 11.7–15.7)
INR PPP: 1.1 (ref 0.85–1.15)
MAGNESIUM SERPL-MCNC: 2.1 MG/DL (ref 1.7–2.3)
MAGNESIUM SERPL-MCNC: 2.5 MG/DL (ref 1.7–2.3)
MCH RBC QN AUTO: 30.1 PG (ref 26.5–33)
MCHC RBC AUTO-ENTMCNC: 32.4 G/DL (ref 31.5–36.5)
MCV RBC AUTO: 93 FL (ref 78–100)
NT-PROBNP SERPL-MCNC: 683 PG/ML (ref 0–1800)
PLATELET # BLD AUTO: 159 10E3/UL (ref 150–450)
POTASSIUM SERPL-SCNC: 4.3 MMOL/L (ref 3.4–5.3)
POTASSIUM SERPL-SCNC: 4.6 MMOL/L (ref 3.4–5.3)
RBC # BLD AUTO: 3.59 10E6/UL (ref 3.8–5.2)
SODIUM SERPL-SCNC: 136 MMOL/L (ref 136–145)
WBC # BLD AUTO: 5.9 10E3/UL (ref 4–11)

## 2023-03-23 PROCEDURE — 83735 ASSAY OF MAGNESIUM: CPT | Performed by: STUDENT IN AN ORGANIZED HEALTH CARE EDUCATION/TRAINING PROGRAM

## 2023-03-23 PROCEDURE — 80048 BASIC METABOLIC PNL TOTAL CA: CPT

## 2023-03-23 PROCEDURE — 36415 COLL VENOUS BLD VENIPUNCTURE: CPT

## 2023-03-23 PROCEDURE — 250N000009 HC RX 250: Performed by: STUDENT IN AN ORGANIZED HEALTH CARE EDUCATION/TRAINING PROGRAM

## 2023-03-23 PROCEDURE — 71045 X-RAY EXAM CHEST 1 VIEW: CPT

## 2023-03-23 PROCEDURE — 93005 ELECTROCARDIOGRAM TRACING: CPT

## 2023-03-23 PROCEDURE — 258N000003 HC RX IP 258 OP 636: Performed by: STUDENT IN AN ORGANIZED HEALTH CARE EDUCATION/TRAINING PROGRAM

## 2023-03-23 PROCEDURE — 83880 ASSAY OF NATRIURETIC PEPTIDE: CPT

## 2023-03-23 PROCEDURE — 99232 SBSQ HOSP IP/OBS MODERATE 35: CPT | Mod: 24 | Performed by: INTERNAL MEDICINE

## 2023-03-23 PROCEDURE — 93010 ELECTROCARDIOGRAM REPORT: CPT | Mod: 59 | Performed by: INTERNAL MEDICINE

## 2023-03-23 PROCEDURE — 71045 X-RAY EXAM CHEST 1 VIEW: CPT | Mod: 26 | Performed by: RADIOLOGY

## 2023-03-23 PROCEDURE — 84132 ASSAY OF SERUM POTASSIUM: CPT

## 2023-03-23 PROCEDURE — 85610 PROTHROMBIN TIME: CPT | Performed by: STUDENT IN AN ORGANIZED HEALTH CARE EDUCATION/TRAINING PROGRAM

## 2023-03-23 PROCEDURE — 214N000001 HC R&B CCU UMMC

## 2023-03-23 PROCEDURE — 83735 ASSAY OF MAGNESIUM: CPT

## 2023-03-23 PROCEDURE — 250N000011 HC RX IP 250 OP 636

## 2023-03-23 PROCEDURE — 99232 SBSQ HOSP IP/OBS MODERATE 35: CPT | Mod: GC | Performed by: STUDENT IN AN ORGANIZED HEALTH CARE EDUCATION/TRAINING PROGRAM

## 2023-03-23 PROCEDURE — 85014 HEMATOCRIT: CPT

## 2023-03-23 PROCEDURE — 250N000013 HC RX MED GY IP 250 OP 250 PS 637: Performed by: STUDENT IN AN ORGANIZED HEALTH CARE EDUCATION/TRAINING PROGRAM

## 2023-03-23 RX ORDER — POTASSIUM CHLORIDE 750 MG/1
40 TABLET, EXTENDED RELEASE ORAL
Status: DISCONTINUED | OUTPATIENT
Start: 2023-03-23 | End: 2023-03-23

## 2023-03-23 RX ORDER — POTASSIUM CHLORIDE 750 MG/1
20 TABLET, EXTENDED RELEASE ORAL
Status: DISCONTINUED | OUTPATIENT
Start: 2023-03-23 | End: 2023-03-23

## 2023-03-23 RX ORDER — LIDOCAINE 40 MG/G
CREAM TOPICAL
Status: DISCONTINUED | OUTPATIENT
Start: 2023-03-23 | End: 2023-03-28

## 2023-03-23 RX ORDER — MAGNESIUM SULFATE HEPTAHYDRATE 40 MG/ML
2 INJECTION, SOLUTION INTRAVENOUS
Status: DISCONTINUED | OUTPATIENT
Start: 2023-03-23 | End: 2023-03-23

## 2023-03-23 RX ORDER — FUROSEMIDE 10 MG/ML
20 INJECTION INTRAMUSCULAR; INTRAVENOUS ONCE
Status: COMPLETED | OUTPATIENT
Start: 2023-03-23 | End: 2023-03-23

## 2023-03-23 RX ADMIN — DILTIAZEM HYDROCHLORIDE 180 MG: 180 CAPSULE, COATED, EXTENDED RELEASE ORAL at 08:06

## 2023-03-23 RX ADMIN — DILTIAZEM HYDROCHLORIDE 15 MG/HR: 5 INJECTION INTRAVENOUS at 04:28

## 2023-03-23 RX ADMIN — FUROSEMIDE 20 MG: 10 INJECTION, SOLUTION INTRAVENOUS at 10:06

## 2023-03-23 ASSESSMENT — ACTIVITIES OF DAILY LIVING (ADL)
ADLS_ACUITY_SCORE: 28
ADLS_ACUITY_SCORE: 24
ADLS_ACUITY_SCORE: 28
ADLS_ACUITY_SCORE: 32
ADLS_ACUITY_SCORE: 28
ADLS_ACUITY_SCORE: 32
ADLS_ACUITY_SCORE: 28
ADLS_ACUITY_SCORE: 28
ADLS_ACUITY_SCORE: 24

## 2023-03-23 NOTE — PROGRESS NOTES
D: Atrial fibrillation with rapid ventricular response (H)  Other fatigue  Hypoxia  Primary malignant neoplasm of lung metastatic to other site, unspecified laterality (H)      I: Monitored vitals and assessed pt status. Assumed care at 2300.  Changed: Pt desating to 88-89 % on 2L at 2300. O2 increased to 5L throughout night. Pt repositioned in bed to sitting position. Pt satting 96% on 5L NC this am. Pt reports no SOB.   Running: Diltiazem at 15 mg/hr      A: Neuro: A&O*4  Lung: clear dim 5L NC. States difficulty fully expanding lungs. Breathes better sitting up.  Neuro: Afib   GI: Last BM 3/21  : Voided 200 ml this shift.   Skin: intact  Pain: denies  Activity: SBA BSC or bathroom    I/O this shift:  In: -   Out: 200 [Urine:200]    Temp:  [97.7  F (36.5  C)-98.2  F (36.8  C)] 97.9  F (36.6  C)  Pulse:  [67-91] 78  Resp:  [18-20] 18  BP: (108-128)/() 117/94  SpO2:  [89 %-94 %] 94 %      P: NPO at MN for IDALMIS/Cardioversion at 1 pm.

## 2023-03-23 NOTE — PROGRESS NOTES
[facility]    Cardiology EP Follow up Note    SUMMARY:  Patient is a 89-year-old female with a history of stage IV adenocarcinoma of right lung, paroxysmal atrial fibrillation ([id number] 6, no longer on anticoagulation since Feb/March 2022 due to recurrent hemoptysis), pericardial effusion s/p pericardiocentesis 2/14/2022 (cytology negative), hypertension and TIA who was admitted on 03/21/2023 for atrial fibrillation with rapid ventricular response. EP consulted to assist with management and advise on possible cardioversion.     Interval Hx:  - Spontaneously converted to sinus rhythm overnight. EKG this AM shows NSR  - Per nursing notes, desatted to the upper 80s overnight on 2L of supplemental oxygen. Sats improved to the mid-90s on 5L O2. No shortness of breath reported.   - This AM, patient denies chest pain or SOB     ===================================================  IMPRESSION:  1. Paroxysmal atrial fibrillation with rapid ventricular response ([id number] 6; Age, sex, HTN, TIA): In the setting of advanced age, left atrial enlargement (CAMILA 62 ml/m2), hypertension and underlying metastatic cancer. Had planned for IDALMIS/DCCV on 03/23, however, patient spontaneously converted to normal sinus rhythm while on diltiazem drip.   2. Hypoxemia, likely due to pulmonary edema in the setting of tachyarrhythmia  3. Mildly reduced LVEF, 45-50% on 03/22/2023 TTE, compared to 61% by biplane in 10/2022. Suspect due to afib RVR.   4. Pericardial effusion s/p pericardiocentesis 3/14/2022  5. Hypertension      RECOMMENDATIONS:  - IDALMIS/DCCV canceled. No need for apixaban and flecainide as discussed in initial consult note.   - Okay to turn off diltiazem drip   - Continue PTA PO diltiazem 180 mg daily. Contemplated increasing dose, especially since there was no clear precipitating factor identified. However, after discussion with patient and taking her preference into consideration, opting  "to keep same dose.   - Consider diuresis for possible pulmonary edema   - No need for outpatient EP follow up      Discussed with attending, Dr. Calvo Please, see staff addendum for changes/additions to the plan.    Kassidy Pereira MD  Cardiology Fellow  [phone]    Addendum:  I saw and evaluated the patient and agree with the fellow s finding and plans as written.  Dickson Calvo MD    ====================================================  OBJECTIVE:  /71 (BP Location: Left arm, Cuff Size: Adult Small)   Pulse 73   Temp 97.7  F (36.5  C) (Oral)   Resp 18   Ht 1.562 m (5' 1.5\")   Wt 48.3 kg (106 lb 8 oz)   SpO2 93%   BMI 19.80 kg/m    Temp (24hrs), Av.8  F (36.6  C), Min:97.7  F (36.5  C), Max:97.9  F (36.6  C)    Physical Exam   GEN: No acute distress   HEENT: EOMI, no icterus, moist mucous membranes  CV: Regular rate and rhythm with occasional irregularity. JVP mid-neck  CHEST: Normal work of breathing. Lungs CTAB, no wheezes or crackles.   ABD: Soft, NT/ND   EXT: Warm and well-perfused, No LE edema  NEURO: Awake, alert, answering questions appropriately, motor grossly nonfocal  SKIN: No worrisome lesions  PSYCH: cooperative, affect appropriate    DATA:   Labs and imaging reviewed. Pertinent results discussed in assessment and plan above.      EKG:    "

## 2023-03-23 NOTE — PROGRESS NOTES
Major shift updates:    -Pt weaned from 5L NC overnight to RA; sating 90-93% at rest. Occasionally needs 1L NC while eating or ambulating or sleeping. CXR obtained d/t increased oxygen needs overnight; Cards 2 added on 1x IV lasix dose 20mg. Pt up w/SBA, voided x1 without accurate measurement. 1 BM this shift.   -discontinued IDALMIS/cardioversion procedure and diltiazem gtt d/t pt's rhythm conversion to SR. EKG obtained to confirm sinus rhythm (in the 70's). Continue to give PO dose of diltiazem. Will discharge on PO diltiazem dosing per cardiology.   -VSS. BP stable, afebrile, AOx4. Skin is WDL.

## 2023-03-23 NOTE — PROGRESS NOTES
[facility]    Progress Note - Medicine Service, MAROON TEAM 1       Date of Admission:  3/21/2023    Assessment & Plan   Barbi Rebolledo is a 89 year old female admitted on 3/21/2023. 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 Trigger of this episode unclear, electrolytes normal and no infectious symptoms, taking diltiazem as prescribed. Takes osimertinib for cancer which can have QT prolonging effects but does not appear to be associated with Afib. Cancer may be contributing. CT PE negative. Started on diltiazem gtt in ED with improvement in symptoms. Has not been on anticoagulation for approximately 1 year. TTE showing EF 45-50%, moderate mitral insufficiency, and mild/moderate tricuspid insufficiency. CXR on 3/23 demonstrating atelectasis and increased pleural effusions. Effusions likely due to new HFmrEF.   - EP consulted, appreciate recs   - Discontinue diltiazem gtt  - Diltiazem 180mg daily   - Lasix 20mg IV x1 today, redose in afternoon  - Wean oxygen    - Monitor electrolytes   - Strict I/Os, daily weights   - Incentive spirometry       # Metastatic lung adenocarcinoma   Follows at [place].  - PTA osimertinib          Diet: Snacks/Supplements Adult: Other; Vanilla Ensure plus with bkf and dinner trays,; With Meals  Snacks/Supplements Adult: Other; Gelatin Plus once daily @ 2 PM, cherry flavor (with whip cream); Between Meals  Regular Diet Adult    DVT Prophylaxis: Pneumatic Compression Devices  Johnson Catheter: Not present  Fluids: none  Lines: None     Cardiac Monitoring: ACTIVE order.  Indication: Tachyarrhythmias, acute (48 hours)  Code Status: Full Code      Clinically Significant Risk Factors                          # Moderate Malnutrition: based on nutrition assessment, PRESENT ON ADMISSION       Disposition Plan         The patient's care was discussed with the Attending Physician, Dr. Webber.    Justine Winston MD  Medicine Service, [facility] TEAM 1  [facility]  Securely message with [employer] (more info)  Text page via [employer] Paging/Directory   See signed in provider for up to date coverage information  ______________________________________________________________________    Interval History   Increasing oxygen requirements overnight up to 5L NC. Self converted to NSR around 1900 yesterday, cardioversion cancelled. This morning weaned to 2L NC. Feels like she can't really take a deep breath and fill her lungs fully which is consistent with how she felt prior to admission, no significant change in respiratory symptoms. No palpitations.      Physical Exam   Vital Signs: Temp: 97.7  F (36.5  C) Temp src: Oral BP: 121/76 Pulse: 76   Resp: 18 SpO2: 91 % O2 Device: Nasal cannula Oxygen Delivery: 2 LPM  Weight: 106 lbs 8 oz    General Appearance: Well appearing, NAD  HEENT: EOMI, MMM   Respiratory: Decreased breath sounds at the bases bilaterally, no crackles/wheezes   Cardiovascular: RRR, JVD above the clavicle   GI: soft, NT, ND      Medical Decision Making       Data     I have personally reviewed the following data over the past 24 hrs:    5.9  \   10.8 (L)   / 159     136 104 20.9 /  105 (H)   4.3 23 0.77 \       Trop: N/A BNP: 683       INR:  1.10 PTT:  N/A   D-dimer:  N/A Fibrinogen:  N/A

## 2023-03-23 NOTE — PROGRESS NOTES
Spoke with 6C RN Praveen about IDALMIS/DCCV orders. Praveen reported that pt is in sinus rhythm. EKG from 8:26 am demonstrates sinus rhythm.     EP MARIO Chely micheal Lala was updated and she canceled orders for IDALMIS/DCCV. Anesthesia was updated and canceled anesthesia for DCCV.     6C CONI Morton updated about canceled procedures.    Merary Peralta RN

## 2023-03-23 NOTE — PLAN OF CARE
D/I/A: Pt up with SBA; tolerating activity with mild DYE noted.  A+O x4 and making needs known; denies pain.  VSS, afebrile.  Afib on monitor.  Diltiazem infusion monitored and maintained.  Pleasant and cooperative.   P: Continue to monitor status.  NPO p MN for Cardioversion in AM.

## 2023-03-24 ENCOUNTER — APPOINTMENT (OUTPATIENT)
Dept: OCCUPATIONAL THERAPY | Facility: CLINIC | Age: 88
DRG: 308 | End: 2023-03-24
Payer: MEDICARE

## 2023-03-24 LAB
ANION GAP SERPL CALCULATED.3IONS-SCNC: 10 MMOL/L (ref 7–15)
ANION GAP SERPL CALCULATED.3IONS-SCNC: 13 MMOL/L (ref 7–15)
ATRIAL RATE - MUSE: 72 BPM
BUN SERPL-MCNC: 21.9 MG/DL (ref 8–23)
BUN SERPL-MCNC: 22.5 MG/DL (ref 8–23)
CALCIUM SERPL-MCNC: 8.7 MG/DL (ref 8.2–9.6)
CALCIUM SERPL-MCNC: 9 MG/DL (ref 8.2–9.6)
CHLORIDE SERPL-SCNC: 103 MMOL/L (ref 98–107)
CHLORIDE SERPL-SCNC: 98 MMOL/L (ref 98–107)
CREAT SERPL-MCNC: 0.69 MG/DL (ref 0.51–0.95)
CREAT SERPL-MCNC: 0.7 MG/DL (ref 0.51–0.95)
DEPRECATED HCO3 PLAS-SCNC: 24 MMOL/L (ref 22–29)
DEPRECATED HCO3 PLAS-SCNC: 24 MMOL/L (ref 22–29)
DIASTOLIC BLOOD PRESSURE - MUSE: NORMAL MMHG
GFR SERPL CREATININE-BSD FRML MDRD: 81 ML/MIN/1.73M2
GFR SERPL CREATININE-BSD FRML MDRD: 81 ML/MIN/1.73M2
GLUCOSE SERPL-MCNC: 101 MG/DL (ref 70–99)
GLUCOSE SERPL-MCNC: 92 MG/DL (ref 70–99)
INTERPRETATION ECG - MUSE: NORMAL
MAGNESIUM SERPL-MCNC: 1.8 MG/DL (ref 1.7–2.3)
MAGNESIUM SERPL-MCNC: 1.9 MG/DL (ref 1.7–2.3)
P AXIS - MUSE: NORMAL DEGREES
POTASSIUM SERPL-SCNC: 3.8 MMOL/L (ref 3.4–5.3)
POTASSIUM SERPL-SCNC: 4.4 MMOL/L (ref 3.4–5.3)
PR INTERVAL - MUSE: 174 MS
QRS DURATION - MUSE: 80 MS
QT - MUSE: 418 MS
QTC - MUSE: 457 MS
R AXIS - MUSE: -12 DEGREES
SODIUM SERPL-SCNC: 135 MMOL/L (ref 136–145)
SODIUM SERPL-SCNC: 137 MMOL/L (ref 136–145)
SYSTOLIC BLOOD PRESSURE - MUSE: NORMAL MMHG
T AXIS - MUSE: 36 DEGREES
VENTRICULAR RATE- MUSE: 72 BPM

## 2023-03-24 PROCEDURE — 97535 SELF CARE MNGMENT TRAINING: CPT | Mod: GO

## 2023-03-24 PROCEDURE — 83735 ASSAY OF MAGNESIUM: CPT | Performed by: STUDENT IN AN ORGANIZED HEALTH CARE EDUCATION/TRAINING PROGRAM

## 2023-03-24 PROCEDURE — 214N000001 HC R&B CCU UMMC

## 2023-03-24 PROCEDURE — 36415 COLL VENOUS BLD VENIPUNCTURE: CPT | Performed by: STUDENT IN AN ORGANIZED HEALTH CARE EDUCATION/TRAINING PROGRAM

## 2023-03-24 PROCEDURE — 250N000013 HC RX MED GY IP 250 OP 250 PS 637: Performed by: STUDENT IN AN ORGANIZED HEALTH CARE EDUCATION/TRAINING PROGRAM

## 2023-03-24 PROCEDURE — 36415 COLL VENOUS BLD VENIPUNCTURE: CPT

## 2023-03-24 PROCEDURE — 82310 ASSAY OF CALCIUM: CPT | Performed by: STUDENT IN AN ORGANIZED HEALTH CARE EDUCATION/TRAINING PROGRAM

## 2023-03-24 PROCEDURE — 83735 ASSAY OF MAGNESIUM: CPT

## 2023-03-24 PROCEDURE — 99232 SBSQ HOSP IP/OBS MODERATE 35: CPT | Mod: GC | Performed by: STUDENT IN AN ORGANIZED HEALTH CARE EDUCATION/TRAINING PROGRAM

## 2023-03-24 PROCEDURE — 80048 BASIC METABOLIC PNL TOTAL CA: CPT

## 2023-03-24 RX ORDER — FUROSEMIDE 10 MG/ML
20 INJECTION INTRAMUSCULAR; INTRAVENOUS ONCE
Status: DISCONTINUED | OUTPATIENT
Start: 2023-03-24 | End: 2023-03-24

## 2023-03-24 RX ORDER — FUROSEMIDE 40 MG
40 TABLET ORAL DAILY
Status: DISCONTINUED | OUTPATIENT
Start: 2023-03-24 | End: 2023-03-24

## 2023-03-24 RX ADMIN — FUROSEMIDE 40 MG: 40 TABLET ORAL at 09:38

## 2023-03-24 RX ADMIN — DILTIAZEM HYDROCHLORIDE 180 MG: 180 CAPSULE, COATED, EXTENDED RELEASE ORAL at 08:03

## 2023-03-24 ASSESSMENT — ACTIVITIES OF DAILY LIVING (ADL)
ADLS_ACUITY_SCORE: 28
ADLS_ACUITY_SCORE: 25
ADLS_ACUITY_SCORE: 28
ADLS_ACUITY_SCORE: 25
ADLS_ACUITY_SCORE: 25
ADLS_ACUITY_SCORE: 28
ADLS_ACUITY_SCORE: 28
ADLS_ACUITY_SCORE: 25
ADLS_ACUITY_SCORE: 28
ADLS_ACUITY_SCORE: 25
ADLS_ACUITY_SCORE: 25
ADLS_ACUITY_SCORE: 28

## 2023-03-24 NOTE — PROGRESS NOTES
No significant events overnight. Patient has steady gait to bathroom. VSS. Currently normal sinus rhythm. Patient does require 2L N/C and having difficulty recovering after exertion. A/Ox4. Otherwise negative assessment. Continue to monitor.

## 2023-03-24 NOTE — PLAN OF CARE
"  Problem: Plan of Care - These are the overarching goals to be used throughout the patient stay.    Goal: Plan of Care Review  Description: The Plan of Care Review/Shift note should be completed every shift.  The Outcome Evaluation is a brief statement about your assessment that the patient is improving, declining, or no change.  This information will be displayed automatically on your shift note.  Outcome: Progressing  Goal: Patient-Specific Goal (Individualized)  Description: You can add care plan individualizations to a care plan. Examples of Individualization might be:  \"Parent requests to be called daily at 9am for status\", \"I have a hard time hearing out of my right ear\", or \"Do not touch me to wake me up as it startles me\".  Outcome: Progressing  Goal: Absence of Hospital-Acquired Illness or Injury  Outcome: Progressing  Intervention: Identify and Manage Fall Risk  Recent Flowsheet Documentation  Taken 3/24/2023 0400 by Dangelo Singh RN  Safety Promotion/Fall Prevention:   activity supervised   assistive device/personal items within reach   lighting adjusted   nonskid shoes/slippers when out of bed   fall prevention program maintained   clutter free environment maintained  Taken 3/24/2023 0000 by Dangelo Singh RN  Safety Promotion/Fall Prevention:   activity supervised   assistive device/personal items within reach   lighting adjusted   nonskid shoes/slippers when out of bed   fall prevention program maintained   clutter free environment maintained  Intervention: Prevent Skin Injury  Recent Flowsheet Documentation  Taken 3/24/2023 0412 by Dangelo Singh RN  Body Position: position changed independently  Taken 3/24/2023 0400 by Dangelo Singh RN  Body Position: position changed independently  Taken 3/24/2023 0000 by Dangelo Singh RN  Body Position: position changed independently  Intervention: Prevent and Manage VTE (Venous Thromboembolism) Risk  Recent Flowsheet Documentation  Taken 3/24/2023 0400 by " Dangelo Singh, RN  VTE Prevention/Management: SCDs (sequential compression devices) off  Taken 3/24/2023 0000 by Dangelo Singh RN  VTE Prevention/Management: SCDs (sequential compression devices) off  Goal: Optimal Comfort and Wellbeing  Outcome: Progressing  Goal: Readiness for Transition of Care  Outcome: Progressing   Goal Outcome Evaluation:

## 2023-03-24 NOTE — PLAN OF CARE
D/I/A:  A+O x4 and making needs known.  VSS. Afebrile. SR on monitor.  Unable to wean O2 this evening.  2L/min per nasal cannula.  Pleasant and cooperative.  Denies pain; mild DYE noted.    P: Continue to monitor status.

## 2023-03-24 NOTE — PROGRESS NOTES
[facility]    Progress Note - Medicine Service, MARVA TEAM 1       Date of Admission:  3/21/2023    Assessment & Plan   Barbi Rebolledo is a 89 year old female admitted on 3/21/2023. 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 Trigger of this episode unclear, electrolytes normal and no infectious symptoms, taking diltiazem as prescribed. Takes osimertinib for cancer which can have QT prolonging effects but does not appear to be associated with Afib. Cancer may be contributing. CT PE negative. Started on diltiazem gtt in ED with improvement in symptoms. Has not been on anticoagulation for approximately 1 year. TTE showing EF 45-50%, moderate mitral insufficiency, and mild/moderate tricuspid insufficiency. CXR on 3/23 demonstrating atelectasis and increased pleural effusions. Effusions likely due to new HFmrEF.   - EP consulted, appreciate recs   - Discontinue diltiazem gtt  - Diltiazem 180mg daily   - Lasix 40mg PO x1 today, redose in afternoon  - Wean oxygen    - Monitor electrolytes   - Strict I/Os, daily weights   - Incentive spirometry       # Metastatic lung adenocarcinoma   Follows at [place].  - PTA osimertinib          Diet: Snacks/Supplements Adult: Other; Vanilla Ensure plus with bkf and dinner trays,; With Meals  Snacks/Supplements Adult: Other; Gelatin Plus once daily @ 2 PM, cherry flavor (with whip cream); Between Meals  Regular Diet Adult    DVT Prophylaxis: Pneumatic Compression Devices  Johnson Catheter: Not present  Fluids: none  Lines: None     Cardiac Monitoring: ACTIVE order. Indication: Tachyarrhythmias, acute (48 hours)  Code Status: Full  Code      Clinically Significant Risk Factors                          # Moderate Malnutrition: based on nutrition assessment, PRESENT ON ADMISSION       Disposition Plan         The patient's care was discussed with the Attending Physician, Dr. Webber.    Justine Winston MD  Medicine Service, [facility] TEAM 1  [facility]  Securely message with [employer] (more info)  Text page via [facility] Paging/Directory   See signed in provider for up to date coverage information  ______________________________________________________________________    Interval History    Required 2L NC overnight. Endorsed some dyspnea with exertion. Denies palpitations.         Physical Exam   Vital Signs: Temp: 97.8  F (36.6  C) Temp src: Oral BP: (!) 150/93 Pulse: 97   Resp: 18 SpO2: 95 % O2 Device: None (Room air) Oxygen Delivery: 2 LPM  Weight: 106 lbs 11.24 oz    General Appearance: Well appearing, NAD  HEENT: EOMI, MMM   Respiratory: Bilateral crackles at the bases,    Cardiovascular: RRR, JVD just above the clavicle   GI: soft, NT, ND      Medical Decision Making       Data     I have personally reviewed the following data over the past 24 hrs:    N/A  \   N/A   / N/A     137 103 22.5 /  92   4.4 24 0.69 \

## 2023-03-24 NOTE — PROGRESS NOTES
Major shift updates:    -weaned back to room air on shift. Tolerating ambulation, rest, and exertion on room air. 91-95%. Pt sitting up in chair and performing regular walks in pierce/room.   -started on PO 40mg lasix daily dose w/good urine output. 1 BM this shift.   -had episode of emesis. Pt states she over-ate. Felt better after emesis. Sips of fluid given w/relief.   -remains SR w/PAC's, on diltiazem PO 180mg. VSS. Afebrile. BP's elevated, MD aware and okay with her levels.   -Possible discharge tomorrow based on tonight's oxygen needs. Wanting to maintain room air overnight to discharge.

## 2023-03-25 ENCOUNTER — APPOINTMENT (OUTPATIENT)
Dept: CT IMAGING | Facility: CLINIC | Age: 88
DRG: 308 | End: 2023-03-25
Attending: STUDENT IN AN ORGANIZED HEALTH CARE EDUCATION/TRAINING PROGRAM
Payer: MEDICARE

## 2023-03-25 ENCOUNTER — APPOINTMENT (OUTPATIENT)
Dept: GENERAL RADIOLOGY | Facility: CLINIC | Age: 88
DRG: 308 | End: 2023-03-25
Attending: STUDENT IN AN ORGANIZED HEALTH CARE EDUCATION/TRAINING PROGRAM
Payer: MEDICARE

## 2023-03-25 LAB
ANION GAP SERPL CALCULATED.3IONS-SCNC: 12 MMOL/L (ref 7–15)
ANION GAP SERPL CALCULATED.3IONS-SCNC: 15 MMOL/L (ref 7–15)
BUN SERPL-MCNC: 18.1 MG/DL (ref 8–23)
BUN SERPL-MCNC: 18.2 MG/DL (ref 8–23)
CALCIUM SERPL-MCNC: 8.8 MG/DL (ref 8.2–9.6)
CALCIUM SERPL-MCNC: 8.8 MG/DL (ref 8.2–9.6)
CHLORIDE SERPL-SCNC: 93 MMOL/L (ref 98–107)
CHLORIDE SERPL-SCNC: 96 MMOL/L (ref 98–107)
CREAT SERPL-MCNC: 0.63 MG/DL (ref 0.51–0.95)
CREAT SERPL-MCNC: 0.64 MG/DL (ref 0.51–0.95)
DEPRECATED HCO3 PLAS-SCNC: 22 MMOL/L (ref 22–29)
DEPRECATED HCO3 PLAS-SCNC: 25 MMOL/L (ref 22–29)
ERYTHROCYTE [DISTWIDTH] IN BLOOD BY AUTOMATED COUNT: 13.6 % (ref 10–15)
GFR SERPL CREATININE-BSD FRML MDRD: 82 ML/MIN/1.73M2
GFR SERPL CREATININE-BSD FRML MDRD: 83 ML/MIN/1.73M2
GLUCOSE SERPL-MCNC: 120 MG/DL (ref 70–99)
GLUCOSE SERPL-MCNC: 143 MG/DL (ref 70–99)
HCT VFR BLD AUTO: 37.4 % (ref 35–47)
HGB BLD-MCNC: 12.7 G/DL (ref 11.7–15.7)
HOLD SPECIMEN: NORMAL
MAGNESIUM SERPL-MCNC: 1.7 MG/DL (ref 1.7–2.3)
MAGNESIUM SERPL-MCNC: 3.1 MG/DL (ref 1.7–2.3)
MCH RBC QN AUTO: 30.5 PG (ref 26.5–33)
MCHC RBC AUTO-ENTMCNC: 34 G/DL (ref 31.5–36.5)
MCV RBC AUTO: 90 FL (ref 78–100)
PLATELET # BLD AUTO: 160 10E3/UL (ref 150–450)
POTASSIUM SERPL-SCNC: 3.5 MMOL/L (ref 3.4–5.3)
POTASSIUM SERPL-SCNC: 3.9 MMOL/L (ref 3.4–5.3)
RBC # BLD AUTO: 4.17 10E6/UL (ref 3.8–5.2)
SODIUM SERPL-SCNC: 130 MMOL/L (ref 136–145)
SODIUM SERPL-SCNC: 133 MMOL/L (ref 136–145)
WBC # BLD AUTO: 15.4 10E3/UL (ref 4–11)

## 2023-03-25 PROCEDURE — 80048 BASIC METABOLIC PNL TOTAL CA: CPT

## 2023-03-25 PROCEDURE — 250N000011 HC RX IP 250 OP 636: Performed by: STUDENT IN AN ORGANIZED HEALTH CARE EDUCATION/TRAINING PROGRAM

## 2023-03-25 PROCEDURE — 85027 COMPLETE CBC AUTOMATED: CPT | Performed by: STUDENT IN AN ORGANIZED HEALTH CARE EDUCATION/TRAINING PROGRAM

## 2023-03-25 PROCEDURE — 214N000001 HC R&B CCU UMMC

## 2023-03-25 PROCEDURE — 74177 CT ABD & PELVIS W/CONTRAST: CPT | Mod: 26 | Performed by: STUDENT IN AN ORGANIZED HEALTH CARE EDUCATION/TRAINING PROGRAM

## 2023-03-25 PROCEDURE — 80048 BASIC METABOLIC PNL TOTAL CA: CPT | Performed by: STUDENT IN AN ORGANIZED HEALTH CARE EDUCATION/TRAINING PROGRAM

## 2023-03-25 PROCEDURE — 74018 RADEX ABDOMEN 1 VIEW: CPT

## 2023-03-25 PROCEDURE — G1010 CDSM STANSON: HCPCS | Mod: GC | Performed by: STUDENT IN AN ORGANIZED HEALTH CARE EDUCATION/TRAINING PROGRAM

## 2023-03-25 PROCEDURE — 999N000128 HC STATISTIC PERIPHERAL IV START W/O US GUIDANCE

## 2023-03-25 PROCEDURE — 258N000003 HC RX IP 258 OP 636: Performed by: STUDENT IN AN ORGANIZED HEALTH CARE EDUCATION/TRAINING PROGRAM

## 2023-03-25 PROCEDURE — 36415 COLL VENOUS BLD VENIPUNCTURE: CPT

## 2023-03-25 PROCEDURE — 93005 ELECTROCARDIOGRAM TRACING: CPT

## 2023-03-25 PROCEDURE — 36415 COLL VENOUS BLD VENIPUNCTURE: CPT | Performed by: STUDENT IN AN ORGANIZED HEALTH CARE EDUCATION/TRAINING PROGRAM

## 2023-03-25 PROCEDURE — G1010 CDSM STANSON: HCPCS

## 2023-03-25 PROCEDURE — 99232 SBSQ HOSP IP/OBS MODERATE 35: CPT | Mod: GC | Performed by: STUDENT IN AN ORGANIZED HEALTH CARE EDUCATION/TRAINING PROGRAM

## 2023-03-25 PROCEDURE — 83735 ASSAY OF MAGNESIUM: CPT

## 2023-03-25 PROCEDURE — 74018 RADEX ABDOMEN 1 VIEW: CPT | Mod: 26 | Performed by: STUDENT IN AN ORGANIZED HEALTH CARE EDUCATION/TRAINING PROGRAM

## 2023-03-25 PROCEDURE — 250N000009 HC RX 250: Performed by: STUDENT IN AN ORGANIZED HEALTH CARE EDUCATION/TRAINING PROGRAM

## 2023-03-25 PROCEDURE — 250N000013 HC RX MED GY IP 250 OP 250 PS 637: Performed by: STUDENT IN AN ORGANIZED HEALTH CARE EDUCATION/TRAINING PROGRAM

## 2023-03-25 PROCEDURE — 93010 ELECTROCARDIOGRAM REPORT: CPT | Performed by: INTERNAL MEDICINE

## 2023-03-25 PROCEDURE — 83735 ASSAY OF MAGNESIUM: CPT | Performed by: STUDENT IN AN ORGANIZED HEALTH CARE EDUCATION/TRAINING PROGRAM

## 2023-03-25 RX ORDER — DILTIAZEM HYDROCHLORIDE 5 MG/ML
10 INJECTION INTRAVENOUS
Status: COMPLETED | OUTPATIENT
Start: 2023-03-25 | End: 2023-03-25

## 2023-03-25 RX ORDER — DILTIAZEM HYDROCHLORIDE 120 MG/1
240 CAPSULE, COATED, EXTENDED RELEASE ORAL DAILY
Status: DISCONTINUED | OUTPATIENT
Start: 2023-03-26 | End: 2023-03-27

## 2023-03-25 RX ORDER — POTASSIUM CHLORIDE 7.45 MG/ML
10 INJECTION INTRAVENOUS
Status: COMPLETED | OUTPATIENT
Start: 2023-03-25 | End: 2023-03-25

## 2023-03-25 RX ORDER — IOPAMIDOL 755 MG/ML
64 INJECTION, SOLUTION INTRAVASCULAR ONCE
Status: COMPLETED | OUTPATIENT
Start: 2023-03-25 | End: 2023-03-25

## 2023-03-25 RX ORDER — PROCHLORPERAZINE MALEATE 5 MG
5 TABLET ORAL EVERY 6 HOURS PRN
Status: DISCONTINUED | OUTPATIENT
Start: 2023-03-25 | End: 2023-03-28 | Stop reason: HOSPADM

## 2023-03-25 RX ORDER — NALOXONE HYDROCHLORIDE 0.4 MG/ML
0.4 INJECTION, SOLUTION INTRAMUSCULAR; INTRAVENOUS; SUBCUTANEOUS
Status: DISCONTINUED | OUTPATIENT
Start: 2023-03-25 | End: 2023-03-28 | Stop reason: HOSPADM

## 2023-03-25 RX ORDER — NALOXONE HYDROCHLORIDE 0.4 MG/ML
0.2 INJECTION, SOLUTION INTRAMUSCULAR; INTRAVENOUS; SUBCUTANEOUS
Status: DISCONTINUED | OUTPATIENT
Start: 2023-03-25 | End: 2023-03-28 | Stop reason: HOSPADM

## 2023-03-25 RX ORDER — HYDROMORPHONE HCL IN WATER/PF 6 MG/30 ML
0.2 PATIENT CONTROLLED ANALGESIA SYRINGE INTRAVENOUS
Status: DISCONTINUED | OUTPATIENT
Start: 2023-03-25 | End: 2023-03-28 | Stop reason: HOSPADM

## 2023-03-25 RX ORDER — MAGNESIUM SULFATE HEPTAHYDRATE 40 MG/ML
4 INJECTION, SOLUTION INTRAVENOUS ONCE
Status: COMPLETED | OUTPATIENT
Start: 2023-03-25 | End: 2023-03-25

## 2023-03-25 RX ORDER — PROCHLORPERAZINE 25 MG
12.5 SUPPOSITORY, RECTAL RECTAL EVERY 12 HOURS PRN
Status: DISCONTINUED | OUTPATIENT
Start: 2023-03-25 | End: 2023-03-28 | Stop reason: HOSPADM

## 2023-03-25 RX ORDER — HEPARIN SODIUM 10000 [USP'U]/100ML
0-5000 INJECTION, SOLUTION INTRAVENOUS CONTINUOUS
Status: DISCONTINUED | OUTPATIENT
Start: 2023-03-25 | End: 2023-03-26

## 2023-03-25 RX ORDER — ONDANSETRON 2 MG/ML
4 INJECTION INTRAMUSCULAR; INTRAVENOUS
Status: DISCONTINUED | OUTPATIENT
Start: 2023-03-25 | End: 2023-03-25

## 2023-03-25 RX ORDER — DILTIAZEM HYDROCHLORIDE 180 MG/1
180 CAPSULE, COATED, EXTENDED RELEASE ORAL ONCE
Status: COMPLETED | OUTPATIENT
Start: 2023-03-25 | End: 2023-03-25

## 2023-03-25 RX ORDER — ONDANSETRON 2 MG/ML
4 INJECTION INTRAMUSCULAR; INTRAVENOUS EVERY 6 HOURS PRN
Status: DISCONTINUED | OUTPATIENT
Start: 2023-03-25 | End: 2023-03-28 | Stop reason: HOSPADM

## 2023-03-25 RX ORDER — ONDANSETRON 4 MG/1
4 TABLET, ORALLY DISINTEGRATING ORAL EVERY 6 HOURS PRN
Status: DISCONTINUED | OUTPATIENT
Start: 2023-03-25 | End: 2023-03-28 | Stop reason: HOSPADM

## 2023-03-25 RX ADMIN — DILTIAZEM HYDROCHLORIDE 5 MG/HR: 5 INJECTION INTRAVENOUS at 13:18

## 2023-03-25 RX ADMIN — ACETAMINOPHEN 650 MG: 325 TABLET ORAL at 04:43

## 2023-03-25 RX ADMIN — IOPAMIDOL 64 ML: 755 INJECTION, SOLUTION INTRAVENOUS at 12:04

## 2023-03-25 RX ADMIN — MAGNESIUM SULFATE IN WATER 4 G: 40 INJECTION, SOLUTION INTRAVENOUS at 08:25

## 2023-03-25 RX ADMIN — POTASSIUM CHLORIDE 10 MEQ: 7.46 INJECTION, SOLUTION INTRAVENOUS at 10:40

## 2023-03-25 RX ADMIN — DILTIAZEM HYDROCHLORIDE 180 MG: 180 CAPSULE, COATED, EXTENDED RELEASE ORAL at 07:57

## 2023-03-25 RX ADMIN — HEPARIN SODIUM AND DEXTROSE 850 UNITS/HR: 10000; 5 INJECTION INTRAVENOUS at 20:19

## 2023-03-25 RX ADMIN — DILTIAZEM HYDROCHLORIDE 180 MG: 180 CAPSULE, COATED, EXTENDED RELEASE ORAL at 12:27

## 2023-03-25 RX ADMIN — DILTIAZEM HYDROCHLORIDE 10 MG: 5 INJECTION, SOLUTION INTRAVENOUS at 08:46

## 2023-03-25 RX ADMIN — POTASSIUM CHLORIDE 10 MEQ: 7.46 INJECTION, SOLUTION INTRAVENOUS at 09:54

## 2023-03-25 RX ADMIN — POTASSIUM CHLORIDE 10 MEQ: 7.46 INJECTION, SOLUTION INTRAVENOUS at 08:51

## 2023-03-25 ASSESSMENT — ACTIVITIES OF DAILY LIVING (ADL)
ADLS_ACUITY_SCORE: 25

## 2023-03-25 NOTE — PROGRESS NOTES
Paged: Cecilia Spence  Time: 0420  HF in room 6419-2. Pt c/o pain in left upper quad of abdomen, which she says it has been there since yesterday. Please advise.  CONI Akins  [id number]  Returned call received at 0426, let us try Tylenol for the pain and re-evaluate

## 2023-03-25 NOTE — PROGRESS NOTES
0745 heart rhythm changed from sinus to afib / flutter. HR 60's to 150's. BP and MAP elevated. Magnesium 1.7, Potassium 3.5. Reported to Charlee 1. EKG ordered. Intermittent nausea; Patient continues to have lower left-sided abdominal pain; no appetite, patient refusing IV dilaudid for pain. Patient refusing zofran. New PIV placed. PRN IV Diltiazem given for HR sustained over 130. Mag and Potassium replaced IV.

## 2023-03-25 NOTE — PROGRESS NOTES
[facility]    Progress Note - Medicine Service, [facility] TEAM 1       Date of Admission:  3/21/2023    Assessment & Plan   Barbi Rebolledo is a 89 year old female admitted on 3/21/2023. 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 Trigger of this episode unclear, electrolytes normal and no infectious symptoms, taking diltiazem as prescribed. Takes osimertinib for cancer which can have QT prolonging effects but does not appear to be associated with Afib. Cancer may be contributing. CT PE negative. Started on diltiazem gtt in ED with improvement in symptoms. Has not been on anticoagulation for approximately 1 year. TTE showing EF 45-50%, moderate mitral insufficiency, and mild/moderate tricuspid insufficiency. CXR on 3/23 demonstrating atelectasis and increased pleural effusions. Effusions likely due to new HFmrEF.   - EP consulted, appreciate recs   - Restart diltiazem drip if heart rates are greater than 130  - Increase diltiazem oral to 240 daily  - Replete electrolytes  - Strict I/Os, daily weights   - Incentive spirometry     Left-sided abdominal pain nausea and vomiting: Started on 3/24, dull achy pain associated with nausea and vomiting that is new.  X-ray of the abdomen did not show any obstruction.  Will evaluate further with CT scan, differential includes  diverticulitis, constipation, bowel obstruction, metastasis, infarction.  No peritoneal signs on exam.  - IV Zofran as needed for nausea  - IV Dilaudid as needed for pain given nausea cannot take orals    Hypokalemia: Replacement protocol  Hypomagnesemia: Replacement protocol    Metastatic lung adenocarcinoma, follows at [facility].  Continue PTA osimertinib.        Diet: Snacks/Supplements Adult: Other; Vanilla Ensure plus with bkf and dinner trays,; With Meals  Snacks/Supplements Adult: Other; Gelatin Plus once daily @ 2 PM, cherry flavor (with whip cream); Between Meals  Regular Diet Adult    DVT Prophylaxis: Pneumatic Compression Devices  Johnson Catheter: Not present  Fluids: none  Lines: None     Cardiac Monitoring: ACTIVE order. Indication: Tachyarrhythmias, acute (48 hours)  Code Status: Full Code      Clinically Significant Risk Factors                          # Moderate Malnutrition: based on nutrition assessment        Disposition Plan         The patient's care was discussed with the Attending Physician, Dr. Webber.    Alondra Coreas MD   Internal Medicine, PGY-3     Medicine Service, [facility] TEAM [address] [place] [address]  Securely message with Vocera (more info)  Text page via [facility] Paging/Directory   See signed in provider for up to date coverage information  ______________________________________________________________________    Interval History    No acute events overnight.  She reports that she did have vomiting yesterday and has felt nauseous all day.  She has had abdominal pain since yesterday which she feels is somewhat worse, it is a dull ache and is constant.  Has not worse with any specific thing and has not really gotten better.  She feels very uncomfortable and reports that the abdominal pain did not seem to be correlated with her heart rate.  She does feel palpitations.    Physical Exam   Vital Signs: Temp: 97.7  F (36.5  C) Temp src: Oral BP: 96/63 Pulse: 101    Resp: 16 SpO2: 95 % O2 Device: Nasal cannula Oxygen Delivery: 1 LPM  Weight: 104 lbs 3.2 oz    General Appearance: Sitting up in bed, no acute distress  HEENT: EOMI  Respiratory: Normal work of breathing on ambient air, no wheezes  Cardiovascular: Tachycardic, warm well perfused, no lower extremity edema  GI: soft, nondistended, tender to palpation in left lower quadrant and left flank, no suprapubic tenderness  Neuro: Alert and oriented, conversant without focal deficits    Data     I have personally reviewed the following data over the past 24 hrs:    N/A  \   N/A   / N/A     130 (L) 93 (L) 18.2 /  143 (H)   3.9 22 0.64 \

## 2023-03-25 NOTE — PLAN OF CARE
"        Neuro: A&Ox4, well able to make needs known, and calls appropriately   Cardiac: SR, Afebrile, VSS.   Respiratory: Lungs sound clear but diminished in the bases, denies cough and SOB, on 1L of oxygen at bed time as pt is a mouth breather, sat dipped to upper 80s when sleeping, Sat>94% on 1L oxygen  GI/: Active bowel sound, passing flatus, had BM 3/24, and voiding spontaneously. No BM this shift.  Diet/appetite: Tolerating regular diet. Denies nausea.  Activity: Up with SBA   Pain: C/o left quad abdominal pain, prn Tylenol given x 1, and MD updated   Skin: OK , no new deficits noted.  Lines: Piv x 1,L hand, SL'D  Drains: None  Plan: Patient had an uneventful night slept majority of the night. Continue with the current POC, notify MD with any change in status    Problem: Plan of Care - These are the overarching goals to be used throughout the patient stay.    Goal: Patient-Specific Goal (Individualized)  Description: You can add care plan individualizations to a care plan. Examples of Individualization might be:  \"Parent requests to be called daily at 9am for status\", \"I have a hard time hearing out of my right ear\", or \"Do not touch me to wake me up as it startles me\".  Outcome: Not Progressing     Problem: Plan of Care - These are the overarching goals to be used throughout the patient stay.    Goal: Plan of Care Review  Description: The Plan of Care Review/Shift note should be completed every shift.  The Outcome Evaluation is a brief statement about your assessment that the patient is improving, declining, or no change.  This information will be displayed automatically on your shift note.  Outcome: Progressing  Flowsheets (Taken 3/25/2023 0313)  Plan of Care Reviewed With: patient  Overall Patient Progress: improving  Goal: Absence of Hospital-Acquired Illness or Injury  Outcome: Progressing  Intervention: Identify and Manage Fall Risk  Recent Flowsheet Documentation  Taken 3/24/2023 5538 by Sita, " Hanane GAY RN  Safety Promotion/Fall Prevention:    activity supervised    assistive device/personal items within reach    nonskid shoes/slippers when out of bed    fall prevention program maintained    clutter free environment maintained    room organization consistent    safety round/check completed  Taken 3/24/2023 1937 by Hanane Buckner RN  Safety Promotion/Fall Prevention:    activity supervised    assistive device/personal items within reach    nonskid shoes/slippers when out of bed    fall prevention program maintained    clutter free environment maintained    room organization consistent    safety round/check completed  Intervention: Prevent Skin Injury  Recent Flowsheet Documentation  Taken 3/24/2023 2334 by Hanane Buckner RN  Body Position: position changed independently  Taken 3/24/2023 1937 by Hanane Buckner RN  Body Position: position changed independently  Intervention: Prevent and Manage VTE (Venous Thromboembolism) Risk  Recent Flowsheet Documentation  Taken 3/24/2023 2334 by Hanane Buckner RN  VTE Prevention/Management: SCDs (sequential compression devices) off  Taken 3/24/2023 1937 by Hanane Buckner RN  VTE Prevention/Management: SCDs (sequential compression devices) off  Goal: Optimal Comfort and Wellbeing  Outcome: Progressing   Goal Outcome Evaluation:      Plan of Care Reviewed With: patient    Overall Patient Progress: improvingOverall Patient Progress: improving

## 2023-03-25 NOTE — PLAN OF CARE
"/85 (BP Location: Left arm)   Pulse 110   Temp 97.7  F (36.5  C) (Oral)   Resp 16   Ht 1.562 m (5' 1.5\")   Wt 47.3 kg (104 lb 3.2 oz)   SpO2 94%   BMI 19.37 kg/m      See progress note    AO X 4, A-flutter (EKG), 's, oral and IV diltiazem, pain left lower abdomen, Abdominal Xray and CT with contrast, last BM 3/24, 1 L NC PRN. [facility] 1 primary updated regularly.    Yellow emesis X 2 with IV potassium, 100 mL. Pt refusing zofran for nausea and dilaudid for pain. Family in room.    Continuing to monitor.  "

## 2023-03-25 NOTE — PROGRESS NOTES
D/she appears pale pink. Daughter is here.  I/I asked her if she can try to eat something.  D/she is going to try to eat corn flakes, banana and milk.   I/I also suggested that when she is thirsty to take a couple drinks of ensure.  D/she says she likes the taste of water better.   I/I suggested she takes a few sips of ensure and then follow with some water. I reminded her that she will get weak from poor nutrition, and won't heal, and may get sick easier  D/she reminded me she likes water better  A/continues in A flutter rate 100-110, (days had to stop Dilt due to low BP). She continues to decline meds for nausea  I/started Heparin drip per order. MD talked to them and I reviewed rationale and risks for this and need for XA level. Talked to them about problems with A fib/flutter-decreased effective heart pumping, risk of clots, and heart rate too fast. So, we try to get rate down, and hope to switch back to sinus rhythm with better rate, and need for blood thinners, keep K and MG high normal levels, and team to look at causes  P/monitor for changes, I told her we could try ensure clear to see if she likes that better. Keep team updated.  A/she ate the whole banana with some milk and 1/2 of the cornflakes.she wanted to keep the soggy flakes there to eat later she and family said she ate this and DID NOT feel nauseated, no abdominal pain at this time

## 2023-03-25 NOTE — PROGRESS NOTES
BRIEF PROGRESS NOTE    Had discussed preliminary read with the patient and her daughter earlier in the afternoon--namely that findings were concerning for progressive malignancy but that infarct related to afib was a possibility. The patient opted to forego oncologic evaluation while inpatient given the fact that she has an appointment to see her oncologist at [facility] in less than a week. Offered antiemetics and low dose oxycodone for pain control.     Once final read resulted, now seems more likely (especially along with timing of conversion to sinus rhythm followed by LUQ abdominal pain in the next 24 hours) that atrial fibrillation led to showering of thrombus. Called the patient and discussed this update. Again revisited possibility of anticoagulation along with risks of bleeding, including intracranial bleeding given her history of brain metastases. She was understanding and requested we proceed with anticoagulation.     Heparin infusion ordered to start tonight--the patient initially asked for apixaban but was amenable to a trial of IV heparin first with transition to orals if this is well tolerated.

## 2023-03-26 LAB
ALBUMIN SERPL BCG-MCNC: 3.1 G/DL (ref 3.5–5.2)
ALP SERPL-CCNC: 147 U/L (ref 35–104)
ALT SERPL W P-5'-P-CCNC: 44 U/L (ref 10–35)
ANION GAP SERPL CALCULATED.3IONS-SCNC: 14 MMOL/L (ref 7–15)
AST SERPL W P-5'-P-CCNC: 55 U/L (ref 10–35)
BASOPHILS # BLD AUTO: 0 10E3/UL (ref 0–0.2)
BASOPHILS NFR BLD AUTO: 0 %
BILIRUB SERPL-MCNC: 0.4 MG/DL
BUN SERPL-MCNC: 23.3 MG/DL (ref 8–23)
CALCIUM SERPL-MCNC: 8.7 MG/DL (ref 8.2–9.6)
CHLORIDE SERPL-SCNC: 92 MMOL/L (ref 98–107)
CREAT SERPL-MCNC: 0.72 MG/DL (ref 0.51–0.95)
DEPRECATED HCO3 PLAS-SCNC: 22 MMOL/L (ref 22–29)
EOSINOPHIL # BLD AUTO: 0 10E3/UL (ref 0–0.7)
EOSINOPHIL NFR BLD AUTO: 0 %
GFR SERPL CREATININE-BSD FRML MDRD: 78 ML/MIN/1.73M2
GLUCOSE SERPL-MCNC: 144 MG/DL (ref 70–99)
HOLD SPECIMEN: NORMAL
IMM GRANULOCYTES # BLD: 0.1 10E3/UL
IMM GRANULOCYTES NFR BLD: 0 %
LYMPHOCYTES # BLD AUTO: 0.4 10E3/UL (ref 0.8–5.3)
LYMPHOCYTES NFR BLD AUTO: 2 %
MAGNESIUM SERPL-MCNC: 2.6 MG/DL (ref 1.7–2.3)
MONOCYTES # BLD AUTO: 0.6 10E3/UL (ref 0–1.3)
MONOCYTES NFR BLD AUTO: 4 %
NEUTROPHILS # BLD AUTO: 14.6 10E3/UL (ref 1.6–8.3)
NEUTROPHILS NFR BLD AUTO: 94 %
NRBC # BLD AUTO: 0 10E3/UL
NRBC BLD AUTO-RTO: 0 /100
POTASSIUM SERPL-SCNC: 4 MMOL/L (ref 3.4–5.3)
PROT SERPL-MCNC: 6 G/DL (ref 6.4–8.3)
SODIUM SERPL-SCNC: 128 MMOL/L (ref 136–145)
UFH PPP CHRO-ACNC: 0.16 IU/ML
UFH PPP CHRO-ACNC: 0.24 IU/ML
WBC # BLD AUTO: 15.4 10E3/UL (ref 4–11)

## 2023-03-26 PROCEDURE — 93005 ELECTROCARDIOGRAM TRACING: CPT

## 2023-03-26 PROCEDURE — 36415 COLL VENOUS BLD VENIPUNCTURE: CPT | Performed by: STUDENT IN AN ORGANIZED HEALTH CARE EDUCATION/TRAINING PROGRAM

## 2023-03-26 PROCEDURE — 250N000013 HC RX MED GY IP 250 OP 250 PS 637: Performed by: STUDENT IN AN ORGANIZED HEALTH CARE EDUCATION/TRAINING PROGRAM

## 2023-03-26 PROCEDURE — 87506 IADNA-DNA/RNA PROBE TQ 6-11: CPT | Performed by: STUDENT IN AN ORGANIZED HEALTH CARE EDUCATION/TRAINING PROGRAM

## 2023-03-26 PROCEDURE — 99233 SBSQ HOSP IP/OBS HIGH 50: CPT | Mod: GC | Performed by: STUDENT IN AN ORGANIZED HEALTH CARE EDUCATION/TRAINING PROGRAM

## 2023-03-26 PROCEDURE — 85520 HEPARIN ASSAY: CPT | Performed by: STUDENT IN AN ORGANIZED HEALTH CARE EDUCATION/TRAINING PROGRAM

## 2023-03-26 PROCEDURE — 36415 COLL VENOUS BLD VENIPUNCTURE: CPT | Performed by: INTERNAL MEDICINE

## 2023-03-26 PROCEDURE — 93010 ELECTROCARDIOGRAM REPORT: CPT | Performed by: INTERNAL MEDICINE

## 2023-03-26 PROCEDURE — 80053 COMPREHEN METABOLIC PANEL: CPT | Performed by: STUDENT IN AN ORGANIZED HEALTH CARE EDUCATION/TRAINING PROGRAM

## 2023-03-26 PROCEDURE — 214N000001 HC R&B CCU UMMC

## 2023-03-26 PROCEDURE — 83735 ASSAY OF MAGNESIUM: CPT | Performed by: INTERNAL MEDICINE

## 2023-03-26 RX ORDER — FUROSEMIDE 40 MG
40 TABLET ORAL ONCE
Status: DISCONTINUED | OUTPATIENT
Start: 2023-03-26 | End: 2023-03-26

## 2023-03-26 RX ORDER — FUROSEMIDE 20 MG
20 TABLET ORAL ONCE
Status: COMPLETED | OUTPATIENT
Start: 2023-03-26 | End: 2023-03-26

## 2023-03-26 RX ADMIN — APIXABAN 2.5 MG: 2.5 TABLET, FILM COATED ORAL at 11:13

## 2023-03-26 RX ADMIN — APIXABAN 2.5 MG: 2.5 TABLET, FILM COATED ORAL at 20:02

## 2023-03-26 RX ADMIN — DILTIAZEM HYDROCHLORIDE 240 MG: 120 CAPSULE, COATED, EXTENDED RELEASE ORAL at 08:41

## 2023-03-26 RX ADMIN — FUROSEMIDE 20 MG: 20 TABLET ORAL at 13:28

## 2023-03-26 ASSESSMENT — ACTIVITIES OF DAILY LIVING (ADL)
ADLS_ACUITY_SCORE: 24
ADLS_ACUITY_SCORE: 25
ADLS_ACUITY_SCORE: 24
ADLS_ACUITY_SCORE: 25

## 2023-03-26 NOTE — PROGRESS NOTES
Addendum to H&P dated 3/21/2023    The patient does NOT have a drug induced coagulation defect.     Ana Paula Webber MD

## 2023-03-26 NOTE — PROGRESS NOTES
EP fellow  Barbi Rebolledo is a 89 year old woman admitted on 3/21/2023. 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   Patient seen by EP recently plan was to do IDALMIS cardioversion however patient spontaneously converted into sinus rhythm.  Few days after the patient went back into A-fib flutter.    EP was called back for management of atrial flutter A-fib, rate was 110s to 120s when seen today (after we increase the diltiazem from 180 to 240 mg daily yesterday), blood pressure stable systolic of 90s.  Last chest x-ray on the system was reviewed (on 3/23/2023) showed worsening of opacities and increasing effusions bilaterally, patient has been on Lasix for diuresis.  Worsening of the lung function could be the underlying reason for patient's going back into A-fib flutter.     Plan: Please consider optimizations of her lung status/improvement of pleural effusions (for the last few nights patient has been using oxygen at night due to episodes of desaturation), will plan for a IDALMIS cardioversion.  Optimizations of her lung status will increase the chances for successful cardioversion. Patient is not on any antiarrhythmic, difficulty to use amiodarone or other antiarrhythmic in the presence of elevated LFTs. We will repeat chest x-ray if improvement is seen patient can have a IDALMIS cardioversion (patient just started her apixaban today, after episodes of renal and splenic infarcts).  Heart rate at the time being is 110-120 in the setting of an EF of 45 to 50%.     Discussed with attending.     Keturah Short MD  Cardiac electrophysiology fellow    I have reviewed the laboratory tests, imaging, and other investigations. I agree with the assessment and plan in this resident/fellow/nurse practitioner's note. In addition, changes in the assessment and plan  have been incorporated into the note by myself, as to make it a single cohesive document. Plan was communicated to referring team/physician.    Bethany Loredo MD, MS  Cardiology/Cardiac EP Attending Staff

## 2023-03-26 NOTE — PROGRESS NOTES
D/she tells me she is getting a cardioversion in the am. She plans to eat a banana and drink ensure tonight. She is not nauseated, but just is not hungry, but she is trying to eat a little and drink the supplement. She did drink a full supplement and a few bites of cheerios and a whole banana.  A/informed of procedure and has no questions at this tiime  P/monitor for changes,, NPO for CV in am

## 2023-03-26 NOTE — PROGRESS NOTES
Addendum to Progress Note dated 3/22/2023    The patient does NOT have a drug induced coagulation defect.     Ana Paula Webber MD

## 2023-03-26 NOTE — PLAN OF CARE
D AVSS with sat's 95% on 1L/min of oxygen via nasal cannula. Heart irregular/Afib/flutter and lungs decreased in bases otherwise clear. Up to bedside commode to void adequate amounts. Voiced no c/o pain or nausea.   I Vital's, assessment and med's per order.   A Resting in bed with call light in reach.   P Continue to monitor and update MD with changes.

## 2023-03-26 NOTE — PROGRESS NOTES
[facility]    Progress Note - Medicine Service, [facility] TEAM 1       Date of Admission:  3/21/2023    Assessment & Plan   Barbi Rebolledo is a 89 year old woman admitted on 3/21/2023. 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 Trigger of this episode unclear, electrolytes normal and no infectious symptoms, taking diltiazem as prescribed. Takes osimertinib for cancer which can have QT prolonging effects but does not appear to be associated with Afib. Cancer may be contributing. CT PE negative. Started on diltiazem gtt in ED with improvement in symptoms. Has not been on anticoagulation for approximately 1 year. TTE showing EF 45-50%, moderate mitral insufficiency, and mild/moderate tricuspid insufficiency. CXR on 3/23 demonstrating atelectasis and increased pleural effusions. Effusions likely due to new HFmrEF. Recent EKG 3/25 shows possible atrial flutter. Started on IV heparin 3/25.  - EP consulted, appreciate recs   - Continue diltiazem oral to 240 daily  - Strict I/Os, daily weights   - Incentive spirometry     Splenic and renal infarcts: Pt noted new abdominal pain 3/24, and CT scan of abdomen 3/25 show multiple findings of infarcts in liver, small bowel, spleen, and left kidney. Most likely attributable to embolic source from underlying arrthymia or possible malignancy hypercoagulable state.  - IV apixaban as above   - IV Zofran  as needed for nausea  - IV Dilaudid as needed for pain given nausea cannot take orals    Leukocytosis: Elevated WBC 15.4, previously 5.9, likely caused by reactive inflammation from infarctions, less likely infection.   - Monitor symptoms  - Trend WBC  - Consider additional infectious workup if patient worsens clinically    Hyponatremia: Patient has persistently decreasing sodium levels since admission likely attributable to decreased intake in the setting of nausea and vomiting. She may also be hypervolemic due to heart failure.  - Encourage PO intake as tolerated  - PO lasix 20 mg   - If persisent consider IV fluids    Elevated liver enzymes: Possibly some hypovolemia versus infarction of liver given recent atrial fibrillation not on anticoagulation and subsequent renal and splenic infarcts.   - CMP in AM     Metastatic lung adenocarcinoma, follows at [facility].  Continue PTA osimertinib.Patient has appointment at [facility] on 3/31/23.   Hypokalemia: Replacement protocol  Hypomagnesemia: Replacement protocol       Diet: Snacks/Supplements Adult: Other; Vanilla Ensure plus with bkf and dinner trays,; With Meals  Snacks/Supplements Adult: Other; Gelatin Plus once daily @ 2 PM, cherry flavor (with whip cream); Between Meals  Regular Diet Adult    DVT Prophylaxis: Pneumatic Compression Devices  Johnson Catheter: Not present  Fluids: none  Lines: None     Cardiac Monitoring: ACTIVE order. Indication: Tachyarrhythmias, acute (48 hours)  Code Status: Full Code      Clinically Significant Risk Factors      # Hyponatremia: Lowest Na = 128 mmol/L in last 2 days, will monitor as appropriate      # Hypoalbuminemia: Lowest albumin = 3.1 g/dL at 3/26/2023  5:37 AM, will monitor as appropriate             # Moderate Malnutrition: based on nutrition assessment        Disposition Plan  In 2 days pending heart rate control, electrolyte abnormalities correction, and no worsening abdominal pain     The patient's care was discussed with the  "Attending Physician, Dr. Webber.     Laurent Julian, MS3  Medical Student    Resident/Fellow Attestation   I, Alondra Coreas, was present with the medical student who participated in the service and in the documentation of the note.  I have verified the history and personally performed the physical exam and medical decision making.  I agree with the assessment and plan of care as documented in the note.      Alondra Coreas MD  Internal Medicine, PGY-3    Date of Service (when I saw the patient): 03/26/23    Medicine Service, [facility] TEAM [address] [place] [address]  Securely message with ClickOn (more info)  Text page via [facility] Paging/Directory   See signed in provider for up to date coverage information  ______________________________________________________________________    Interval History    NAEON    She states that she feels nauseated and that her appetite has decreased. She says that she \"feels more full and food doesn't taste as good\". She attributes this to new \"imaging findings of damage\" in her spleen and GI tract. She denies any current vomiting besides yesterday 3/25. Has had limited amounts to eat, but says she wants \"To try a broth or soup\". Denies SOB but states that overnight \"the oxygen machine beeps loud because my levels decrease\". Endorses abdominal discomfort but says \"I wouldn't call it pain\".    Physical Exam   Vital Signs: Temp: 97.6  F (36.4  C) Temp src: Axillary BP: 106/67 Pulse: 119   Resp: 16 SpO2: 98 % O2 Device: None (Room air) Oxygen Delivery: 1 LPM  Weight: 104 lbs 9.6 oz    General Appearance: Laying down in bed no apparent distress  Respiratory: Normal work of breathing on ambient air, no wheezes  Cardiovascular: Tachycardic irregularly rhythmn, warm well perfused, no lower extremity edema, dorsalis pedis pulses 1+  GI: soft, nondistended, tender to deep palpation in R. upper quadrant, no suprapubic tenderness, bowel sounds normoactive, "   Neuro: Alert and oriented, conversant without focal deficits    Data     I have personally reviewed the following data over the past 24 hrs:    15.4 (H); 15.4 (H)  \   12.7   / 160     128 (L) 92 (L) 23.3 (H) /  144 (H)   4.0 22 0.72 \       ALT: 44 (H) AST: 55 (H) AP: 147 (H) TBILI: 0.4   ALB: 3.1 (L) TOT PROTEIN: 6.0 (L) LIPASE: N/A

## 2023-03-26 NOTE — PLAN OF CARE
"/67   Pulse 119   Temp 97.6  F (36.4  C) (Axillary)   Resp 16   Ht 1.562 m (5' 1.5\")   Wt 47.4 kg (104 lb 9.6 oz)   SpO2 98%   BMI 19.44 kg/m       Dx Afib with RVR, converted to SR and back to afib/flutter 2/25.    AO X 4, 1 L NC PRN, afib with RVR, 's - 120's, oral diltiazem and eliquis, denies pain, appetite increasing, no nausea / emesis. Poor UOP. No BM. Encouraged to turn in bed side to side. Foam dressing placed on coccyx. Heparin discontinued.   "

## 2023-03-27 ENCOUNTER — ANESTHESIA (OUTPATIENT)
Dept: SURGERY | Facility: CLINIC | Age: 88
DRG: 308 | End: 2023-03-27
Payer: MEDICARE

## 2023-03-27 ENCOUNTER — APPOINTMENT (OUTPATIENT)
Dept: GENERAL RADIOLOGY | Facility: CLINIC | Age: 88
DRG: 308 | End: 2023-03-27
Payer: MEDICARE

## 2023-03-27 ENCOUNTER — ANESTHESIA EVENT (OUTPATIENT)
Dept: SURGERY | Facility: CLINIC | Age: 88
DRG: 308 | End: 2023-03-27
Payer: MEDICARE

## 2023-03-27 ENCOUNTER — APPOINTMENT (OUTPATIENT)
Dept: CARDIOLOGY | Facility: CLINIC | Age: 88
DRG: 308 | End: 2023-03-27
Attending: NURSE PRACTITIONER
Payer: MEDICARE

## 2023-03-27 LAB
ALBUMIN SERPL BCG-MCNC: 3.1 G/DL (ref 3.5–5.2)
ALP SERPL-CCNC: 148 U/L (ref 35–104)
ALT SERPL W P-5'-P-CCNC: 37 U/L (ref 10–35)
ANION GAP SERPL CALCULATED.3IONS-SCNC: 12 MMOL/L (ref 7–15)
AST SERPL W P-5'-P-CCNC: 46 U/L (ref 10–35)
BASOPHILS # BLD AUTO: 0 10E3/UL (ref 0–0.2)
BASOPHILS NFR BLD AUTO: 0 %
BILIRUB SERPL-MCNC: 0.4 MG/DL
BUN SERPL-MCNC: 22.6 MG/DL (ref 8–23)
C COLI+JEJUNI+LARI FUSA STL QL NAA+PROBE: NOT DETECTED
CALCIUM SERPL-MCNC: 8.7 MG/DL (ref 8.2–9.6)
CHLORIDE SERPL-SCNC: 94 MMOL/L (ref 98–107)
CREAT SERPL-MCNC: 0.74 MG/DL (ref 0.51–0.95)
DEPRECATED HCO3 PLAS-SCNC: 23 MMOL/L (ref 22–29)
EC STX1 GENE STL QL NAA+PROBE: NOT DETECTED
EC STX2 GENE STL QL NAA+PROBE: NOT DETECTED
EOSINOPHIL # BLD AUTO: 0.2 10E3/UL (ref 0–0.7)
EOSINOPHIL NFR BLD AUTO: 2 %
ERYTHROCYTE [DISTWIDTH] IN BLOOD BY AUTOMATED COUNT: 13.7 % (ref 10–15)
GFR SERPL CREATININE-BSD FRML MDRD: 75 ML/MIN/1.73M2
GLUCOSE SERPL-MCNC: 102 MG/DL (ref 70–99)
HCT VFR BLD AUTO: 36.5 % (ref 35–47)
HGB BLD-MCNC: 12 G/DL (ref 11.7–15.7)
HOLD SPECIMEN: NORMAL
IMM GRANULOCYTES # BLD: 0 10E3/UL
IMM GRANULOCYTES NFR BLD: 0 %
LYMPHOCYTES # BLD AUTO: 0.6 10E3/UL (ref 0.8–5.3)
LYMPHOCYTES NFR BLD AUTO: 6 %
MAGNESIUM SERPL-MCNC: 2.3 MG/DL (ref 1.7–2.3)
MCH RBC QN AUTO: 29.9 PG (ref 26.5–33)
MCHC RBC AUTO-ENTMCNC: 32.9 G/DL (ref 31.5–36.5)
MCV RBC AUTO: 91 FL (ref 78–100)
MONOCYTES # BLD AUTO: 0.9 10E3/UL (ref 0–1.3)
MONOCYTES NFR BLD AUTO: 9 %
NEUTROPHILS # BLD AUTO: 8.8 10E3/UL (ref 1.6–8.3)
NEUTROPHILS NFR BLD AUTO: 83 %
NOROV GI+II ORF1-ORF2 JNC STL QL NAA+PR: NOT DETECTED
NRBC # BLD AUTO: 0 10E3/UL
NRBC BLD AUTO-RTO: 0 /100
PLATELET # BLD AUTO: 165 10E3/UL (ref 150–450)
POTASSIUM SERPL-SCNC: 4.3 MMOL/L (ref 3.4–5.3)
PROT SERPL-MCNC: 6 G/DL (ref 6.4–8.3)
RBC # BLD AUTO: 4.01 10E6/UL (ref 3.8–5.2)
RVA NSP5 STL QL NAA+PROBE: NOT DETECTED
SALMONELLA SP RPOD STL QL NAA+PROBE: NOT DETECTED
SHIGELLA SP+EIEC IPAH STL QL NAA+PROBE: NOT DETECTED
SODIUM SERPL-SCNC: 129 MMOL/L (ref 136–145)
V CHOL+PARA RFBL+TRKH+TNAA STL QL NAA+PR: NOT DETECTED
WBC # BLD AUTO: 10.6 10E3/UL (ref 4–11)
Y ENTERO RECN STL QL NAA+PROBE: NOT DETECTED

## 2023-03-27 PROCEDURE — 250N000009 HC RX 250: Performed by: INTERNAL MEDICINE

## 2023-03-27 PROCEDURE — 93312 ECHO TRANSESOPHAGEAL: CPT | Mod: 26 | Performed by: INTERNAL MEDICINE

## 2023-03-27 PROCEDURE — 5A2204Z RESTORATION OF CARDIAC RHYTHM, SINGLE: ICD-10-PCS | Performed by: NURSE PRACTITIONER

## 2023-03-27 PROCEDURE — 80053 COMPREHEN METABOLIC PANEL: CPT | Performed by: STUDENT IN AN ORGANIZED HEALTH CARE EDUCATION/TRAINING PROGRAM

## 2023-03-27 PROCEDURE — 92960 CARDIOVERSION ELECTRIC EXT: CPT

## 2023-03-27 PROCEDURE — 85025 COMPLETE CBC W/AUTO DIFF WBC: CPT | Performed by: STUDENT IN AN ORGANIZED HEALTH CARE EDUCATION/TRAINING PROGRAM

## 2023-03-27 PROCEDURE — 93325 DOPPLER ECHO COLOR FLOW MAPG: CPT | Mod: 26 | Performed by: INTERNAL MEDICINE

## 2023-03-27 PROCEDURE — 93005 ELECTROCARDIOGRAM TRACING: CPT

## 2023-03-27 PROCEDURE — 93325 DOPPLER ECHO COLOR FLOW MAPG: CPT

## 2023-03-27 PROCEDURE — 93010 ELECTROCARDIOGRAM REPORT: CPT | Mod: 59 | Performed by: INTERNAL MEDICINE

## 2023-03-27 PROCEDURE — 36415 COLL VENOUS BLD VENIPUNCTURE: CPT | Performed by: STUDENT IN AN ORGANIZED HEALTH CARE EDUCATION/TRAINING PROGRAM

## 2023-03-27 PROCEDURE — 250N000009 HC RX 250: Performed by: REGISTERED NURSE

## 2023-03-27 PROCEDURE — 71045 X-RAY EXAM CHEST 1 VIEW: CPT

## 2023-03-27 PROCEDURE — 370N000017 HC ANESTHESIA TECHNICAL FEE, PER MIN

## 2023-03-27 PROCEDURE — 71045 X-RAY EXAM CHEST 1 VIEW: CPT | Mod: 26 | Performed by: RADIOLOGY

## 2023-03-27 PROCEDURE — 99232 SBSQ HOSP IP/OBS MODERATE 35: CPT | Mod: GC | Performed by: STUDENT IN AN ORGANIZED HEALTH CARE EDUCATION/TRAINING PROGRAM

## 2023-03-27 PROCEDURE — 93312 ECHO TRANSESOPHAGEAL: CPT

## 2023-03-27 PROCEDURE — 99152 MOD SED SAME PHYS/QHP 5/>YRS: CPT | Performed by: INTERNAL MEDICINE

## 2023-03-27 PROCEDURE — 250N000011 HC RX IP 250 OP 636: Performed by: INTERNAL MEDICINE

## 2023-03-27 PROCEDURE — 214N000001 HC R&B CCU UMMC

## 2023-03-27 PROCEDURE — 92960 CARDIOVERSION ELECTRIC EXT: CPT | Performed by: NURSE PRACTITIONER

## 2023-03-27 PROCEDURE — 83735 ASSAY OF MAGNESIUM: CPT | Performed by: INTERNAL MEDICINE

## 2023-03-27 PROCEDURE — 258N000003 HC RX IP 258 OP 636: Performed by: INTERNAL MEDICINE

## 2023-03-27 PROCEDURE — 93320 DOPPLER ECHO COMPLETE: CPT | Mod: 26 | Performed by: INTERNAL MEDICINE

## 2023-03-27 PROCEDURE — 250N000013 HC RX MED GY IP 250 OP 250 PS 637: Performed by: STUDENT IN AN ORGANIZED HEALTH CARE EDUCATION/TRAINING PROGRAM

## 2023-03-27 RX ORDER — NALOXONE HYDROCHLORIDE 0.4 MG/ML
0.4 INJECTION, SOLUTION INTRAMUSCULAR; INTRAVENOUS; SUBCUTANEOUS
Status: DISCONTINUED | OUTPATIENT
Start: 2023-03-27 | End: 2023-03-28

## 2023-03-27 RX ORDER — NALOXONE HYDROCHLORIDE 0.4 MG/ML
0.2 INJECTION, SOLUTION INTRAMUSCULAR; INTRAVENOUS; SUBCUTANEOUS
Status: DISCONTINUED | OUTPATIENT
Start: 2023-03-27 | End: 2023-03-28

## 2023-03-27 RX ORDER — LIDOCAINE 40 MG/G
CREAM TOPICAL
Status: DISCONTINUED | OUTPATIENT
Start: 2023-03-27 | End: 2023-03-28

## 2023-03-27 RX ORDER — POTASSIUM CHLORIDE 750 MG/1
40 TABLET, EXTENDED RELEASE ORAL
Status: DISCONTINUED | OUTPATIENT
Start: 2023-03-27 | End: 2023-03-28

## 2023-03-27 RX ORDER — MAGNESIUM SULFATE HEPTAHYDRATE 40 MG/ML
2 INJECTION, SOLUTION INTRAVENOUS
Status: DISCONTINUED | OUTPATIENT
Start: 2023-03-27 | End: 2023-03-28

## 2023-03-27 RX ORDER — ACYCLOVIR 200 MG/1
9.5 CAPSULE ORAL
Status: DISCONTINUED | OUTPATIENT
Start: 2023-03-27 | End: 2023-03-28

## 2023-03-27 RX ORDER — DILTIAZEM HYDROCHLORIDE 120 MG/1
240 CAPSULE, COATED, EXTENDED RELEASE ORAL DAILY
Status: DISCONTINUED | OUTPATIENT
Start: 2023-03-28 | End: 2023-03-28

## 2023-03-27 RX ORDER — FUROSEMIDE 20 MG
20 TABLET ORAL DAILY
Status: DISCONTINUED | OUTPATIENT
Start: 2023-03-27 | End: 2023-03-28 | Stop reason: HOSPADM

## 2023-03-27 RX ORDER — LIDOCAINE HYDROCHLORIDE 20 MG/ML
15 SOLUTION OROPHARYNGEAL ONCE
Status: COMPLETED | OUTPATIENT
Start: 2023-03-27 | End: 2023-03-27

## 2023-03-27 RX ORDER — FENTANYL CITRATE 50 UG/ML
25 INJECTION, SOLUTION INTRAMUSCULAR; INTRAVENOUS
Status: DISCONTINUED | OUTPATIENT
Start: 2023-03-27 | End: 2023-03-28

## 2023-03-27 RX ORDER — FLUMAZENIL 0.1 MG/ML
0.2 INJECTION, SOLUTION INTRAVENOUS
Status: DISCONTINUED | OUTPATIENT
Start: 2023-03-27 | End: 2023-03-28

## 2023-03-27 RX ORDER — POTASSIUM CHLORIDE 750 MG/1
20 TABLET, EXTENDED RELEASE ORAL
Status: DISCONTINUED | OUTPATIENT
Start: 2023-03-27 | End: 2023-03-28

## 2023-03-27 RX ORDER — SODIUM CHLORIDE 9 MG/ML
INJECTION, SOLUTION INTRAVENOUS CONTINUOUS PRN
Status: DISCONTINUED | OUTPATIENT
Start: 2023-03-27 | End: 2023-03-27

## 2023-03-27 RX ADMIN — DILTIAZEM HYDROCHLORIDE 240 MG: 120 CAPSULE, COATED, EXTENDED RELEASE ORAL at 08:19

## 2023-03-27 RX ADMIN — MIDAZOLAM 0.5 MG: 1 INJECTION INTRAMUSCULAR; INTRAVENOUS at 12:01

## 2023-03-27 RX ADMIN — LIDOCAINE HYDROCHLORIDE 30 ML: 20 SOLUTION ORAL; TOPICAL at 11:49

## 2023-03-27 RX ADMIN — APIXABAN 2.5 MG: 2.5 TABLET, FILM COATED ORAL at 08:19

## 2023-03-27 RX ADMIN — SODIUM CHLORIDE: 9 INJECTION, SOLUTION INTRAVENOUS at 13:09

## 2023-03-27 RX ADMIN — APIXABAN 2.5 MG: 2.5 TABLET, FILM COATED ORAL at 20:07

## 2023-03-27 RX ADMIN — Medication 30 MG: at 13:12

## 2023-03-27 RX ADMIN — TOPICAL ANESTHETIC 0.5 ML: 200 SPRAY DENTAL; PERIODONTAL at 11:53

## 2023-03-27 RX ADMIN — MIDAZOLAM 0.5 MG: 1 INJECTION INTRAMUSCULAR; INTRAVENOUS at 11:58

## 2023-03-27 ASSESSMENT — ACTIVITIES OF DAILY LIVING (ADL)
ADLS_ACUITY_SCORE: 24

## 2023-03-27 ASSESSMENT — ENCOUNTER SYMPTOMS: DYSRHYTHMIAS: 1

## 2023-03-27 NOTE — ANESTHESIA CARE TRANSFER NOTE
Patient: Barbi Rebolledo    Procedure: Procedure(s):  Anesthesia cardioversion       Diagnosis: Atrial fibrillation, unspecified type (H) [I48.91]  Diagnosis Additional Information: No value filed.    Anesthesia Type:   No value filed.     Note:    Oropharynx: oropharynx clear of all foreign objects and spontaneously breathing  Level of Consciousness: drowsy  Oxygen Supplementation: nasal cannula  Level of Supplemental Oxygen (L/min / FiO2): 4  Independent Airway: airway patency satisfactory and stable  Dentition: dentition unchanged  Vital Signs Stable: post-procedure vital signs reviewed and stable  Report to RN Given: handoff report given  Patient transferred to: Phase II (2A cardiology)    Handoff Report: Identifed the Patient, Identified the Reponsible Provider, Reviewed the pertinent medical history, Discussed the surgical course, Reviewed Intra-OP anesthesia mangement and issues during anesthesia, Set expectations for post-procedure period and Allowed opportunity for questions and acknowledgement of understanding      Vitals:  Vitals Value Taken Time   BP     Temp     Pulse     Resp     SpO2         Electronically Signed By: JOSELUIS Ellis CRNA  March 27, 2023  1:19 PM

## 2023-03-27 NOTE — PROGRESS NOTES
Pt arrived in ECHO department  for scheduled IDALMIS/DCCV.   Procedure explained, questions answered and consent signed. Discharge instructions discussed with patient.  Pt's throat sprayed at 1150, therefore pt will not be able to eat or drink until 2 hours after at 1350.  Informed pt of this time and encouraged to start with warm fluids and soft foods.    Pt tolerated procedure well, and was given a total of 1 mg IV versed for conscious sedation.  Pt denied throat or chest pain after IDALMIS complete.   IDALMIS probe 56 used for procedure.  No clots visualized on IDALMIS so proceeded with DCCV.  Anesthesia gave pt 30 mg IV brevital for sedation and pt was DCCV at 120 Joules to a SR.  Pt denied pain after procedure and was transferred back to  after awake and VSS.  Daughter Charity and son Nahum updated to plan of care.     Report given to KINGSLEY Littlejohn RN.     Merary Peralta RN

## 2023-03-27 NOTE — ANESTHESIA PREPROCEDURE EVALUATION
Anesthesia Pre-Procedure Evaluation    Patient: Barbi Rebolledo   MRN: [id number] : 1930        Procedure : Procedure(s):  Anesthesia cardioversion          Past Medical History:   Diagnosis Date     Squamous cell carcinoma       Past Surgical History:   Procedure Laterality Date     MOHS MICROGRAPHIC PROCEDURE        Allergies   Allergen Reactions     Fentanyl Other (See Comments)     Sedation     Lisinopril Swelling     Swelling of tongue     Novocain [Procaine] Other (See Comments)     Tachycardia      Social History     Tobacco Use     Smoking status: Never     Smokeless tobacco: Never   Substance Use Topics     Alcohol use: Not on file      Wt Readings from Last 1 Encounters:   23 47.8 kg (105 lb 6.1 oz)        Anesthesia Evaluation            ROS/MED HX  ENT/Pulmonary:       Neurologic:     (+) TIA,     Cardiovascular:     (+) -----dysrhythmias, a-fib,  (-) murmur and wheezes   METS/Exercise Tolerance:     Hematologic:       Musculoskeletal:       GI/Hepatic:       Renal/Genitourinary:       Endo:       Psychiatric/Substance Use:       Infectious Disease:       Malignancy:   (+) Malignancy, History of Lung.    Other:            Physical Exam    Airway        Mallampati: II   TM distance: > 3 FB   Neck ROM: full   Mouth opening: > 3 cm    Respiratory Devices and Support         Dental           Cardiovascular          Rhythm and rate: irregular and normal (-) no systolic click and no murmur    Pulmonary   pulmonary exam normal        breath sounds clear to auscultation   (-) no wheezes        OUTSIDE LABS:  CBC:   Lab Results   Component Value Date    WBC 10.6 2023    WBC 15.4 (H) 2023    WBC 15.4 (H) 2023    HGB 12.0 2023    HGB 12.7 2023    HCT 36.5 2023    HCT 37.4 2023     2023     2023     BMP:   Lab Results   Component Value Date     (L) 2023     (L) 2023    POTASSIUM 4.3 2023    POTASSIUM 4.0  03/26/2023    CHLORIDE 94 (L) 03/27/2023    CHLORIDE 92 (L) 03/26/2023    CO2 23 03/27/2023    CO2 22 03/26/2023    BUN 22.6 03/27/2023    BUN 23.3 (H) 03/26/2023    CR 0.74 03/27/2023    CR 0.72 03/26/2023     (H) 03/27/2023     (H) 03/26/2023     COAGS:   Lab Results   Component Value Date    PTT 31 06/15/2014    INR 1.10 03/23/2023     POC: No results found for: BGM, HCG, HCGS  HEPATIC:   Lab Results   Component Value Date    ALBUMIN 3.1 (L) 03/27/2023    PROTTOTAL 6.0 (L) 03/27/2023    ALT 37 (H) 03/27/2023    AST 46 (H) 03/27/2023    ALKPHOS 148 (H) 03/27/2023    BILITOTAL 0.4 03/27/2023     OTHER:   Lab Results   Component Value Date    DEANA 8.7 03/27/2023    MAG 2.3 03/27/2023    TSH 1.46 03/21/2023    T4 9.9 07/21/2021    .4 (H) 06/15/2014    SED 44 (H) 06/15/2014       Anesthesia Plan    ASA Status:  3   NPO Status:  NPO Appropriate    Anesthesia Type: General.     - Airway: Native airway   Induction: Intravenous.   Maintenance: Inhalation.        Consents    Anesthesia Plan(s) and associated risks, benefits, and realistic alternatives discussed. Questions answered and patient/representative(s) expressed understanding.    - Discussed:     - Discussed with:  Patient      - Extended Intubation/Ventilatory Support Discussed: Yes.      - Patient is DNR/DNI Status: No    Use of blood products discussed: Yes.     - Discussed with: Patient.     Postoperative Care    Pain management: IV analgesics.        Comments:                Christopher J. Behrens, MD

## 2023-03-27 NOTE — ANESTHESIA POSTPROCEDURE EVALUATION
Patient: Barbi Rebolledo    Procedure: Procedure(s):  Anesthesia cardioversion       Anesthesia Type:  General    Note:  Disposition: Outpatient   Postop Pain Control: Uneventful            Sign Out: Well controlled pain   PONV: No   Neuro/Psych: Uneventful            Sign Out: Acceptable/Baseline neuro status   Airway/Respiratory: Uneventful            Sign Out: Acceptable/Baseline resp. status   CV/Hemodynamics: Uneventful            Sign Out: Acceptable CV status   Other NRE: NONE   DID A NON-ROUTINE EVENT OCCUR? No           Last vitals:  Vitals:    03/27/23 1312 03/27/23 1317 03/27/23 1321   BP: (!) 81/62 (!) 82/58 (!) 81/54   Pulse: 80 76 74   Resp: 27 18 19   Temp:      SpO2: 92% 92% 94%       Electronically Signed By: Christopher J. Behrens, MD  March 27, 2023  1:38 PM

## 2023-03-27 NOTE — PLAN OF CARE
"BP 95/62   Pulse 78   Temp 98  F (36.7  C) (Oral)   Resp 20   Ht 1.562 m (5' 1.5\")   Wt 47.8 kg (105 lb 6.1 oz)   SpO2 91%   BMI 19.59 kg/m       AO X 4, SR 70's after IDALMIS / cardioversion, VSS, afebrile, 1 L NC PRN, bed rest post conscious sedation. Family in room.  "

## 2023-03-27 NOTE — PROGRESS NOTES
D/she continues to be sound asleep and moans and moves some post CV. Family tells us that the last time she had surgery it took her 5 hours to wake up. contiues in SR  I/turned on side,   A/she is pink and breathing easily on 2 L post CCL. At 1800 she was wide awake and says she feels okay, and will try to eat supper soon.Later tonight she ate mashed potatoes and butter. She reminded me that this is a good [place] meal.   I/we talked for a few minutes about [place] food. She lives next to relatives. She DOES not want to be on daily Lasix and says she will talk to the team about this in the am, she wears oxygen at night and is on RA during the day per her usual  P/monitor for changes, possibly home tomorrow

## 2023-03-27 NOTE — PROCEDURES
[facility]    Procedure: Cardioversion    Date/Time: 3/27/2023 1:22 PM  Performed by: Chely Guthrie APRN CNP  Authorized by: Chely Guthrie APRN CNP       UNIVERSAL PROTOCOL   Site Marked: NA  Prior Images Obtained and Reviewed:  NA  Required items: Required blood products, implants, devices and special equipment available    Patient identity confirmed:  Verbally with patient  Patient was reevaluated immediately before administering moderate or deep sedation or anesthesia  Confirmation Checklist:  Patient's identity using two indicators  Time out: Immediately prior to the procedure a time out was called    Universal Protocol: the Joint Commission Universal Protocol was followed       ANESTHESIA  Anesthesia was administered and monitored by anesthesiology.  See anesthesia documentation for details.    PROCEDURE DETAILS  Pre-procedure rhythm: atrial fibrillation  Patient position: patient was placed in a supine position  Chest area: chest area exposed  Electrodes: pads  Electrodes placed: anterior-posterior  Number of attempts: 1    Details of Attempts:  120J biphasic synchronized shock delivered with successful conversion to sinus   Post-procedure rhythm: normal sinus rhythm  Complications: no complications      PROCEDURE    Patient Tolerance:  Patient tolerated the procedure well with no immediate complications  Length of time physician/provider present for 1:1 monitoring during sedation: 0        JOSELUIS Chery CNP  Electrophysiology Consult Service  Pager: [phone]

## 2023-03-27 NOTE — PRE-PROCEDURE
GENERAL PRE-PROCEDURE:   Procedure:  Transesophageal echocardiogram.  Date/Time:  3/27/2023 11:41 AM    Risks and benefits: Risks, benefits and alternatives were discussed    Patient states understanding of procedure being performed: Yes    Patient's understanding of procedure matches consent: Yes    Procedure consent matches procedure scheduled: Yes    Appropriately NPO:  Yes  ASA Class:  3  Lungs:  Lungs clear with good breath sounds bilaterally  Heart:  Normal heart sounds and rate  History & Physical reviewed:  History and physical reviewed and no updates needed  Statement of review:  I have reviewed the lab findings, diagnostic data, medications, and the plan for sedation

## 2023-03-27 NOTE — CONSULTS
Discharge Pharmacy Test Claim    Eliquis is covered by patient's [employer] Medicare Part D plan. Per the [employer]  website, copay is $10.35 during the initial coverage phase and $0 once patient meets the catastrophic phase. [facility] is not contracted with this plan. Please prescribe to patient's preferred pharmacy.    Test Claim Initial Copay Catastrophic Phase   eliquis 10.35 0.00       Ivon Orellana  [facility] Liaison  Ph: [phone] Pager: [phone]   Securely message with the Vocera Web Console (learn more here)

## 2023-03-27 NOTE — PLAN OF CARE
Major Shift Events:  Pt A&O x4 and denies pain this shift. Pt up to bathroom x1 this shift w/ standby assist. Pt NPO since midnight for possible cardioversion later on 3/27/2023.     For vital signs and complete assessments, please see documentation flowsheets.

## 2023-03-27 NOTE — PROGRESS NOTES
[facility]    Progress Note - Medicine Service, [facility] TEAM 1       Date of Admission:  3/21/2023    Assessment & Plan   Barbi Rebolledo is a 89 year old woman admitted on 3/21/2023. 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     Today:  - PO lasix 20 mg once   - XR showing some improvement from prior, still with cardiomegaly and possible edema  - Plan for cardioversion with IDALMIS per EP, will await recs post-procedure regarding medication adjustment     Persistent recurrent atrial fibrillation with rapid ventricular response  Acute hypoxic respiratory failure (1L NC overnight likely BC)  Patient presented with hypoxia and palpitations and was found to have atrial fibrillation with rates in the 140s. CT PE negative. Had not been on anticoagulation for approximately 1 year due to gamma knife procedures. She was started on diltiazem gtt and electrophysiology initially had planned cardioversion but she self-converted to normal sinus rhythm then returned to atrial fibrillation a day later. Currently rates in 120 on PO diltiazem.   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 It may be that this EF is not entirely accurate given patient was in atrial fibrillation at the time (rates 70s). CXR on 3/27 with atelectasis and pleural effusions. Effusions likely due to new HFmrEF.   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 Most likely attributable to embolic source from underlying arrthymia or possible malignancy hypercoagulable state.  - PO apixaban as above   - IV Zofran as needed for nausea    Leukocytosis, improving: Elevated WBC 15.4 (3/25), previously 5.9, likely caused by reactive inflammation from infarctions, less likely infection. WBC 10.6 on 3/27.  - Monitor symptoms  - Trend WBC  - Consider additional infectious workup if patient worsens clinically    Hyponatremia secondary to unknown cause: Patient has persistently decreasing sodium levels since admission likely attributable to decreased intake in the setting of nausea and vomiting. She may also be hypervolemic due to heart failure.  - Encourage PO intake as tolerated  - PO lasix 20 mg     Elevated liver enzymes, downtrending: Possibly some hypovolemia vs metastasis, versus infarction of liver given recent atrial fibrillation not on anticoagulation and subsequent renal and splenic infarcts. AST 46, ALT 37, Alk phos 148 (3/27).  - Daily CMP     Nocturnal hypoxic respiratory failure most likely secondary to obstructive sleep apnea: Patient has required use of low flow oxygen (2L or less) since being admitted to the hospital with desaturation of 90% while sleeping. Patient denies any symptoms while awake and lying flat and is not requiring oxygen to maintain sats >90%. This is most likely related to undiagnosed obstructive sleep apnea given hypoxia only while sleeping.  - Consider outpatient sleep study    Metastatic lung adenocarcinoma, follows at [facility]. Continue PTA osimertinib. Patient has appointment at [facility] on 3/31/23.   Hypokalemia: Replacement protocol  Hypomagnesemia: Replacement protocol     Diet: Snacks/Supplements Adult: Other; Vanilla Ensure  plus with bkf and dinner trays,; With Meals  Snacks/Supplements Adult: Other; Gelatin Plus once daily @ 2 PM, cherry flavor (with whip cream); Between Meals  NPO per Anesthesia Guidelines for Procedure/Surgery Except for: Meds    DVT Prophylaxis: Pneumatic Compression Devices  Johnson Catheter: Not present  Fluids: none  Lines: None     Cardiac Monitoring: ACTIVE order. Indication: Tachyarrhythmias, acute (48 hours)  Code Status: Full Code      Clinically Significant Risk Factors      # Hyponatremia: Lowest Na = 128 mmol/L in last 2 days, will monitor as appropriate      # Hypoalbuminemia: Lowest albumin = 3.1 g/dL at 3/27/2023  6:14 AM, will monitor as appropriate             # Moderate Malnutrition: based on nutrition assessment      Disposition Plan  In 2 days pending heart rate control, electrolyte abnormalities correction, and no worsening abdominal pain     The patient's care was discussed with the Attending Physician, Dr. Webber.     Laurent Julian, MS3  Medical Student    Resident/Fellow Attestation   I, Alondra Coreas, was present with the medical student who participated in the service and in the documentation of the note.  I have verified the history and personally performed the physical exam and medical decision making.  I agree with the assessment and plan of care as documented in the note.      Alondra Coreas MD  Internal Medicine, PGY-3    Date of Service (when I saw the patient): 03/27/23    Medicine Service, [facility] TEAM 1  [facility]  Securely message with [employer] (more info)  Text page via [facility] Paging/Directory   See signed in provider for up to date coverage information  ______________________________________________________________________    Interval History    NAEON    Patient was NPO since midnight for possible cardioversion. She states that she is somewhat nauseous today but denies any vomiting. She says that her abdominal tenderness improved  "after having a \"gassy bowel movement\". She denies any pain, lightheadedness, or SOB.     Physical Exam   Vital Signs: Temp: 98.3  F (36.8  C) Temp src: Oral BP: 98/70 Pulse: (!) 139   Resp: 18 SpO2: 96 % O2 Device: Nasal cannula Oxygen Delivery: 1 LPM  Weight: 105 lbs 6.08 oz    General Appearance: Laying down in bed no apparent distress  Respiratory: Normal work of breathing on ambient air, no wheezes  Cardiovascular: Tachycardic irregularly rhythmn, warm well perfused, no lower extremity edema, dorsalis pedis pulses 2+  GI: soft, nondistended, no tenderness noted in all quadrants, no suprapubic tenderness, bowel sounds normoactive,   Neuro: Alert and oriented, conversant without focal deficits    Data     I have personally reviewed the following data over the past 24 hrs:    10.6  \   12.0   / 165     129 (L) 94 (L) 22.6 /  102 (H)   4.3 23 0.74 \       ALT: 37 (H) AST: 46 (H) AP: 148 (H) TBILI: 0.4   ALB: 3.1 (L) TOT PROTEIN: 6.0 (L) LIPASE: N/A       Imaging results reviewed over the past 24 hrs:   Recent Results (from the past 24 hour(s))   XR Chest Port 1 View    Narrative    EXAM:  XR CHEST PORT 1 VIEW    INDICATION: 89 yo female with hx of paroxysmal afib and metastatic  lung adenocarcinoma; evaluate pleural effusions    COMPARISON:  Chest x-ray 3/23/2023, CTAP 3/25/2023    FINDINGS:  Single AP view of the chest.    Stable cardiomegaly. Retrocardiac opacity with silhouetting of the  medial bilateral hemidiaphragms. Diffuse interstitial opacities. Hazy  bibasilar opacities. No pneumothorax.  Unremarkable upper abdomen.  Right sixth posterior rib lucency better seen on CT PE 3/21/2023.  Aortic calcifications.      Impression    IMPRESSION:  1.  Stable cardiomegaly with interstitial opacities, likely pulmonary  edema.  2.  Hazy bibasilar opacities likely reflecting moderate right and  small-moderate left pleural effusions with superimposed  atelectasis/edema.    I have personally reviewed the examination and " initial interpretation  and I agree with the findings.    MAGALIS FREED MD         SYSTEM ID:  [id number]

## 2023-03-28 VITALS
SYSTOLIC BLOOD PRESSURE: 108 MMHG | HEIGHT: 62 IN | RESPIRATION RATE: 16 BRPM | OXYGEN SATURATION: 93 % | WEIGHT: 104.6 LBS | DIASTOLIC BLOOD PRESSURE: 66 MMHG | HEART RATE: 91 BPM | BODY MASS INDEX: 19.25 KG/M2 | TEMPERATURE: 98 F

## 2023-03-28 LAB
ALBUMIN SERPL BCG-MCNC: 3.2 G/DL (ref 3.5–5.2)
ALP SERPL-CCNC: 142 U/L (ref 35–104)
ALT SERPL W P-5'-P-CCNC: 33 U/L (ref 10–35)
ANION GAP SERPL CALCULATED.3IONS-SCNC: 10 MMOL/L (ref 7–15)
AST SERPL W P-5'-P-CCNC: 42 U/L (ref 10–35)
ATRIAL RATE - MUSE: 312 BPM
ATRIAL RATE - MUSE: 82 BPM
ATRIAL RATE - MUSE: NORMAL BPM
BASOPHILS # BLD AUTO: 0 10E3/UL (ref 0–0.2)
BASOPHILS NFR BLD AUTO: 0 %
BILIRUB SERPL-MCNC: 0.5 MG/DL
BUN SERPL-MCNC: 22.7 MG/DL (ref 8–23)
CALCIUM SERPL-MCNC: 8.7 MG/DL (ref 8.2–9.6)
CHLORIDE SERPL-SCNC: 96 MMOL/L (ref 98–107)
CREAT SERPL-MCNC: 0.74 MG/DL (ref 0.51–0.95)
DEPRECATED HCO3 PLAS-SCNC: 24 MMOL/L (ref 22–29)
DIASTOLIC BLOOD PRESSURE - MUSE: NORMAL MMHG
EOSINOPHIL # BLD AUTO: 0.1 10E3/UL (ref 0–0.7)
EOSINOPHIL NFR BLD AUTO: 1 %
ERYTHROCYTE [DISTWIDTH] IN BLOOD BY AUTOMATED COUNT: 13.7 % (ref 10–15)
GFR SERPL CREATININE-BSD FRML MDRD: 75 ML/MIN/1.73M2
GLUCOSE SERPL-MCNC: 91 MG/DL (ref 70–99)
HCT VFR BLD AUTO: 36.1 % (ref 35–47)
HGB BLD-MCNC: 11.7 G/DL (ref 11.7–15.7)
IMM GRANULOCYTES # BLD: 0.1 10E3/UL
IMM GRANULOCYTES NFR BLD: 1 %
INTERPRETATION ECG - MUSE: NORMAL
LYMPHOCYTES # BLD AUTO: 0.6 10E3/UL (ref 0.8–5.3)
LYMPHOCYTES NFR BLD AUTO: 7 %
MAGNESIUM SERPL-MCNC: 2.4 MG/DL (ref 1.7–2.3)
MCH RBC QN AUTO: 30.5 PG (ref 26.5–33)
MCHC RBC AUTO-ENTMCNC: 32.4 G/DL (ref 31.5–36.5)
MCV RBC AUTO: 94 FL (ref 78–100)
MONOCYTES # BLD AUTO: 0.7 10E3/UL (ref 0–1.3)
MONOCYTES NFR BLD AUTO: 10 %
NEUTROPHILS # BLD AUTO: 6.1 10E3/UL (ref 1.6–8.3)
NEUTROPHILS NFR BLD AUTO: 81 %
NRBC # BLD AUTO: 0 10E3/UL
NRBC BLD AUTO-RTO: 0 /100
P AXIS - MUSE: 58 DEGREES
P AXIS - MUSE: NORMAL DEGREES
P AXIS - MUSE: NORMAL DEGREES
PLATELET # BLD AUTO: 182 10E3/UL (ref 150–450)
POTASSIUM SERPL-SCNC: 4.4 MMOL/L (ref 3.4–5.3)
PR INTERVAL - MUSE: 182 MS
PR INTERVAL - MUSE: NORMAL MS
PR INTERVAL - MUSE: NORMAL MS
PROT SERPL-MCNC: 6 G/DL (ref 6.4–8.3)
QRS DURATION - MUSE: 74 MS
QRS DURATION - MUSE: 78 MS
QRS DURATION - MUSE: 80 MS
QT - MUSE: 348 MS
QT - MUSE: 364 MS
QT - MUSE: 420 MS
QTC - MUSE: 490 MS
QTC - MUSE: 501 MS
QTC - MUSE: 515 MS
R AXIS - MUSE: -17 DEGREES
R AXIS - MUSE: -30 DEGREES
R AXIS - MUSE: -37 DEGREES
RBC # BLD AUTO: 3.84 10E6/UL (ref 3.8–5.2)
SODIUM SERPL-SCNC: 130 MMOL/L (ref 136–145)
SYSTOLIC BLOOD PRESSURE - MUSE: NORMAL MMHG
T AXIS - MUSE: -13 DEGREES
T AXIS - MUSE: -56 DEGREES
T AXIS - MUSE: 147 DEGREES
VENTRICULAR RATE- MUSE: 114 BPM
VENTRICULAR RATE- MUSE: 132 BPM
VENTRICULAR RATE- MUSE: 82 BPM
WBC # BLD AUTO: 7.6 10E3/UL (ref 4–11)

## 2023-03-28 PROCEDURE — 80053 COMPREHEN METABOLIC PANEL: CPT | Performed by: STUDENT IN AN ORGANIZED HEALTH CARE EDUCATION/TRAINING PROGRAM

## 2023-03-28 PROCEDURE — 36415 COLL VENOUS BLD VENIPUNCTURE: CPT | Performed by: STUDENT IN AN ORGANIZED HEALTH CARE EDUCATION/TRAINING PROGRAM

## 2023-03-28 PROCEDURE — 93010 ELECTROCARDIOGRAM REPORT: CPT | Performed by: INTERNAL MEDICINE

## 2023-03-28 PROCEDURE — 83735 ASSAY OF MAGNESIUM: CPT | Performed by: INTERNAL MEDICINE

## 2023-03-28 PROCEDURE — 85025 COMPLETE CBC W/AUTO DIFF WBC: CPT | Performed by: STUDENT IN AN ORGANIZED HEALTH CARE EDUCATION/TRAINING PROGRAM

## 2023-03-28 PROCEDURE — 93005 ELECTROCARDIOGRAM TRACING: CPT

## 2023-03-28 PROCEDURE — 99239 HOSP IP/OBS DSCHRG MGMT >30: CPT | Mod: GC | Performed by: STUDENT IN AN ORGANIZED HEALTH CARE EDUCATION/TRAINING PROGRAM

## 2023-03-28 PROCEDURE — 250N000013 HC RX MED GY IP 250 OP 250 PS 637: Performed by: STUDENT IN AN ORGANIZED HEALTH CARE EDUCATION/TRAINING PROGRAM

## 2023-03-28 PROCEDURE — 99231 SBSQ HOSP IP/OBS SF/LOW 25: CPT | Mod: 24 | Performed by: NURSE PRACTITIONER

## 2023-03-28 RX ORDER — DILTIAZEM HYDROCHLORIDE 120 MG/1
240 CAPSULE, COATED, EXTENDED RELEASE ORAL DAILY
Status: DISCONTINUED | OUTPATIENT
Start: 2023-03-28 | End: 2023-03-28 | Stop reason: HOSPADM

## 2023-03-28 RX ORDER — DILTIAZEM HYDROCHLORIDE 240 MG/1
240 CAPSULE, COATED, EXTENDED RELEASE ORAL DAILY
Qty: 90 CAPSULE | Refills: 3 | Status: ON HOLD | OUTPATIENT
Start: 2023-03-29 | End: 2023-04-12

## 2023-03-28 RX ORDER — FUROSEMIDE 20 MG
TABLET ORAL
Qty: 90 TABLET | Refills: 3 | Status: ON HOLD | OUTPATIENT
Start: 2023-03-28 | End: 2023-04-12

## 2023-03-28 RX ADMIN — APIXABAN 2.5 MG: 2.5 TABLET, FILM COATED ORAL at 08:38

## 2023-03-28 RX ADMIN — DILTIAZEM HYDROCHLORIDE 240 MG: 120 CAPSULE, COATED, EXTENDED RELEASE ORAL at 10:44

## 2023-03-28 RX ADMIN — FUROSEMIDE 20 MG: 20 TABLET ORAL at 08:38

## 2023-03-28 ASSESSMENT — ACTIVITIES OF DAILY LIVING (ADL)
ADLS_ACUITY_SCORE: 24

## 2023-03-28 NOTE — DISCHARGE SUMMARY
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   Follow up with Electrophysiology in 1 month, oncology as scheduled, and   schedule a sleep study.     Appointments on [place] and/or [facility] (with [facility] or [facility]   provider or service). Call [phone] if you haven't heard regarding   these appointments within 7 days of discharge.               Discharge Disposition   Discharged to home  Condition at discharge: Stable      Hospital Course   Barbi Rebolledo was admitted on 3/21/2023 for Afib with RVR.  The following problems were addressed during her hospitalization:    Persistent recurrent atrial fibrillation with rapid ventricular response  Acute hypoxic respiratory failure (1L NC overnight likely BC)  Patient presented with hypoxia and palpitations and was found to have atrial fibrillation with rates in the 140s. CT PE negative. Had not been on anticoagulation for approximately 1 year due to gamma knife procedures. She was started on diltiazem gtt and electrophysiology initially had planned cardioversion but she self-converted to normal sinus rhythm then returned to atrial fibrillation a day later. Currently rates in 120 on PO diltiazem. EP was consulted, her diltiazem was  increased to 240mg daily and she had a DCCV on 3/27. Would consider amiodarone if sustained recurrence of afib but at this time continue diltiazem.   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 It may be that this EF is not entirely accurate given patient was in atrial fibrillation at the time (rates 70s). CXR on 3/27 with atelectasis and pleural effusions. Effusions likely due to new HFmrEF. She received IV diuresis which was transitioned to PO prior to discharge.   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 Most likely attributable to embolic source from underlying arrthymia or possible malignancy hypercoagulable state. Was started on apixaban 2.5mg BID for Afib.      Leukocytosis, resolved: Elevated WBC 15.4 on admission likely caused by reactive inflammation from infarctions, less likely infection.      Hyponatremia secondary to unknown cause: Patient has persistently decreasing sodium levels since admission likely attributable to decreased intake in the setting of nausea and vomiting. She may also be hypervolemic due to heart failure.     Elevated liver enzymes, downtrending: Possibly some hypovolemia vs metastasis, versus infarction of liver given recent atrial fibrillation not on anticoagulation and subsequent renal and splenic infarcts. Downtrending on  CMP.      Nocturnal hypoxic respiratory failure most likely secondary to obstructive sleep apnea: Patient has required use of low flow oxygen (2L or less) since being admitted to the hospital with desaturation of 90% while sleeping. Patient denies any symptoms while awake and lying flat and is not requiring oxygen to maintain sats >90%. This is most likely related to undiagnosed obstructive sleep apnea given hypoxia only while sleeping.  - Referred for outpatient sleep study     Metastatic lung adenocarcinoma, follows at [facility]. Continue PTA osimertinib. Patient has follow up appointment at [facility] on 3/31/23.     Hypokalemia: Replacement protocol  Hypomagnesemia: Replacement protocol    Consultations This Hospital Stay   OCCUPATIONAL THERAPY ADULT IP CONSULT  PHYSICAL THERAPY ADULT IP CONSULT  CARDIOLOGY ELECTROPHYSIOLOGY (EP) IP CONSULT  NURSING TO CONSULT FOR VASCULAR ACCESS CARE IP CONSULT  NURSING TO CONSULT FOR VASCULAR ACCESS CARE IP CONSULT  SURGICAL ONCOLOGY ADULT IP CONSULT  PHARMACY IP CONSULT  PHARMACY LIAISON FOR MEDICATION COVERAGE CONSULT  DENTAL HYGIENE IP ADULT CONSULT - [facility]    Code Status   Full Code       The patient was discussed with Dr. Akbar Winston MD  [address] [address]  Phone: [phone]  ______________________________________________________________________    Physical Exam   Vital Signs: Temp: 98  F (36.7  C) Temp src: Oral BP: 108/66 Pulse: 91   Resp: 16 SpO2: 93 % O2 Device: None (Room air) Oxygen Delivery: 1 LPM  Weight: 104 lbs 9.6 oz  General Appearance: Laying in bed, comfortable appearing  Respiratory: CTAB, normal WOB on room air   Cardiovascular: RRR, warm/well perfused  GI: soft, nontender, nondistended, normal bowel sounds  Skin: no rashes/lesions on exposed skin   Neuro: Awake, alert, oriented, no focal deficits        Primary Care Physician   Cristina Balderas    Discharge Orders      Sleep Study  Referral      Reason for your hospital stay    You were admitted to the hospital because of a fast heart rate. This happened because of your atrial fibrillation (A fib) and because of excess fluid that built up around your lungs. You underwent a cardioversion to return your hear rhythm to a normal rhythm, increased your diltiazem, and gave you medications to remove the excess fluid.  - Follow up with your primary care doctor in the next 2-3 weeks  - Follow up with Electrophysiology (EP) in 1 month, they should be setting this appointment up  - Follow up with your oncologist at [facility] as scheduled  - Follow up for a sleep study to be evaluate for sleep apnea   - Start taking diltiazem 240mg daily  - Start taking apixaban 2.5mg twice a day  - Take furosemide 40mg as needed for weight gain of 3 pounds in 1 day or 5 pounds in 7 days     Activity    Your activity upon discharge: activity as tolerated     Monitor and record    Weigh yourself every morning     When to contact your care team    Contact your care team If symptoms get worse, If increased shortness of breath, If waking up at night with difficulty breathing, If unable to lie down for sleep due to symptoms, If weight gain of 2 pounds a day for 2 days in a row OR 5 pounds in 1 week., Increased swelling in your ankles or legs, and Dizziness or lightheadedness     Adult [facility] Follow-up and recommended labs and tests    Follow up with primary care provider, Cristina Balderas, in 2-3 weeks. Follow up with Electrophysiology in 1 month, oncology as scheduled, and schedule a sleep study.     Appointments on [place] and/or [facility] (with [facility] or [facility] provider or service). Call [phone] if you haven't heard regarding these appointments within 7 days of discharge.     Diet    Follow this diet upon discharge: regular       Significant Results and Procedures   Most Recent 3 CBC's:Recent Labs   Lab Test 03/28/23  0626 03/27/23  0614 03/25/23  1848   WBC 7.6 10.6  15.4*  15.4*   HGB 11.7 12.0 12.7   MCV 94 91 90    165 160     Most Recent 3 BMP's:Recent Labs   Lab Test 03/28/23  0626 03/27/23  0614 03/26/23  0537   * 129* 128*   POTASSIUM 4.4 4.3 4.0   CHLORIDE 96* 94* 92*   CO2 24 23 22   BUN 22.7 22.6 23.3*   CR 0.74 0.74 0.72   ANIONGAP 10 12 14   DEANA 8.7 8.7 8.7   GLC 91 102* 144*     Most Recent 2 LFT's:Recent Labs   Lab Test 03/28/23  0626 03/27/23  0614   AST 42* 46*   ALT 33 37*   ALKPHOS 142* 148*   BILITOTAL 0.5 0.4       Discharge Medications   Current Discharge Medication List      START taking these medications    Details   apixaban ANTICOAGULANT (ELIQUIS) 2.5 MG tablet Take 1 tablet (2.5 mg) by mouth 2 times daily  Qty: 180 tablet, Refills: 3    Associated Diagnoses: Paroxysmal atrial fibrillation (H)      diltiazem ER COATED BEADS (CARDIZEM CD/CARTIA XT) 240 MG 24 hr capsule Take 1 capsule (240 mg) by mouth daily  Qty: 90 capsule, Refills: 3    Associated Diagnoses: Paroxysmal atrial fibrillation (H)      furosemide (LASIX) 20 MG tablet Take 40mg if you gain 3 pounds in 1 day or 5 pounds in 7 days.  Qty: 90 tablet, Refills: 3    Associated Diagnoses: Congestive heart failure, unspecified HF chronicity, unspecified heart failure type (H)         CONTINUE these medications which have NOT CHANGED    Details   emollient (VANICREAM) cream Apply topically daily to entire body, especially after bathing.  Qty: 453 g, Refills: 11    Associated Diagnoses: Dermatitis; Xerosis of skin      osimertinib (TAGRISSO) 80 MG tablet Take 80 mg by mouth daily         STOP taking these medications       diltiazem ER (DILT-XR) 180 MG 24 hr capsule Comments:   Reason for Stopping:             Allergies   Allergies   Allergen Reactions     Brevital Sodium [Methohexital] Fatigue     Was aware of surroundings but unable to move, gave her significant anxiety after given for DCCV 3 hours after administered 30g     Fentanyl Other (See Comments)     Sedation     Lisinopril  Swelling     Swelling of tongue     Novocain [Procaine] Other (See Comments)     Tachycardia

## 2023-03-28 NOTE — PROGRESS NOTES
"    Electrophysiology Consult Service  Follow up Note   Date of Service: 3/28/2023     S: We continue to follow this patient for rhythm management. Her BPs have improved post DCCV and she is maintaining sinus. She reports feeling improved.   HPI:  Ms. Rebolledo is a 89 year old female who has a medical history significant for stage IV adenocarcinoma of right lung, PAF (CHADSVASC 6), off anticoagulation due to hemoptysis, pericardial effusion s/p pericardiocentesis 2/2022 (cytology negative), HTN, and TIA. She was admitted on 3/21/23 with AF with RVR. She had acutely developed palpitations and dyspnea that day. Previously, with these symptoms she would take PO diltiazem and resolve; however, when this one persisted she came in for evaluation. She was started on IV diltiazem and spontaneously converted.  Echo had shown LVEF 45-50% on 3/22/23. She recurred and was then cleared to start anticoagulation again after new splenic and renal infarcts. She was having some hypotension with the AF. She then underwent IDALMIS/DCCV on 3/27/23.  O:   Vitals: /83   Pulse 90   Temp 98.2  F (36.8  C)   Resp 16   Ht 1.562 m (5' 1.5\")   Wt 47.4 kg (104 lb 9.6 oz)   SpO2 93%   BMI 19.44 kg/m    Gen:   NAD   Lungs:  CTAB   CV:  S1S2,Reg, no M/R/G noted. No JVD.   Abd: NT, ND, Soft   Ext:  No pedal edema    Data:  Labs:  BMPRecent Labs   Lab 03/28/23  0626 03/27/23  0614 03/26/23  0537 03/25/23  1241   * 129* 128* 130*   POTASSIUM 4.4 4.3 4.0 3.9   CHLORIDE 96* 94* 92* 93*   DEANA 8.7 8.7 8.7 8.8   CO2 24 23 22 22   BUN 22.7 22.6 23.3* 18.2   CR 0.74 0.74 0.72 0.64   GLC 91 102* 144* 143*     CBC  Recent Labs   Lab 03/28/23  0626 03/27/23  0614 03/25/23  1848 03/23/23  0430   WBC 7.6 10.6 15.4*  15.4* 5.9   RBC 3.84 4.01 4.17 3.59*   HGB 11.7 12.0 12.7 10.8*   HCT 36.1 36.5 37.4 33.3*   MCV 94 91 90 93   MCH 30.5 29.9 30.5 30.1   MCHC 32.4 32.9 34.0 32.4   RDW 13.7 13.7 13.6 13.9    165 160 159     INR  Recent Labs   Lab " 23  0430 23  0511   INR 1.10 1.15       EKG:       Meds per EPIC EMR:    3/22/23 Echo:  Interpretation Summary  Left ventricular function is decreased. The ejection fraction is 45-50%  (mildly reduced).  Global right ventricular function is mildly reduced.  Moderate mitral insufficiency is present.  Mild to moderate tricuspid insufficiency is present.  Estimated mean right atrial pressure is elevated ~15 mmHg  No pericardial effusion is present.  A:   Ms. Rebolledo is a 89 year old female who has a medical history significant for stage IV adenocarcinoma of right lung, PAF (CHADSVASC 6), off anticoagulation due to hemoptysis, pericardial effusion s/p pericardiocentesis 2022 (cytology negative), HTN, and TIA. She was admitted on 3/21/23 with AF with RVR. She had acutely developed palpitations and dyspnea that day. Previously, with these symptoms she would take PO diltiazem and resolve; however, when this one persisted she came in for evaluation. She was started on IV diltiazem and spontaneously converted.  Echo had shown LVEF 45-50% on 3/22/23. She recurred and was then cleared to start anticoagulation again after new splenic and renal infarcts. She was having some hypotension with the AF. She then underwent IDALMIS/DCCV on 3/27/23.We continue to follow this patient for rhythm management. Her BPs have improved post DCCV and she is maintaining sinus. She reports feeling improved.     EP recommendations:   Paroxsymal Atrial Fibrillation:   We discussed in detail with the patient management/treatment options for Berta includin. Stroke Prophylaxis:  CHADSVASC=++age, +gender, ++TIA, +HTN  6, corresponding to a 9.8% annual stroke / [facility] event rate. indicating need for long term oral anticoagulation.  She is on Eliquis now appropriately at 2.5 mg BID. She had some history of hemoptysis so will need to monitor tolerance. She will need to be on Eliquis for at least 4 weeks post DCCV.   2. Rate Control:  Continue Diltiazem at 240 mg daily.   3. Rhythm Control: S/p DCCV 3/27/23. Does have history of PAF. Given her co-morbidities, amiodarone is likely the best option should AAT be needed (acknowledge that LFTs have had mild elevation). Could possibly consider low dose flecainide,Patient strongly preferred to avoid AATs at this time. Will do amiodarone if sustained recurrences.   4. Risk Factor Management: Statin, BP control, and BC evaluation as indicated.  At discharge, follow up in 4 weeks post DCCV.       The patient states understanding and is agreeable with plan.   Thank you much for allowing us to participate in the care of this pleasant patient.     JOSELUIS Chery CNP  Electrophysiology Consult Service  Pager: [phone]

## 2023-03-28 NOTE — PLAN OF CARE
D: Barbi Rebolledo is a 89 year old female admitted on 3/21/2023. 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     I: Monitored vitals and assessed pt status.      PRN:   Tele: SR 80s  O2: 1L NC  Mobility: SBA     A: Neuro: A/O x4.  Call light appropriate.  Able to make needs known.  Respiratory:  1L NC. Sats around 92%. Denies shortness of breath at rest.  Cardiac: VSS. Remains in SR 70s-80s after CV.  GI: Sat on toilet to pass gas but no luck with BM. No N/V.  : Voiding adequate clear, yellow urine.  Skin: No deficits noted  LDA:  PIV - SL  Pain: Slight chest discomfort likely from procedure  Diet: Regular     P: Continue to monitor Pt status and report changes to [facility] 1.      Goal Outcome Evaluation:      Plan of Care Reviewed With: patient    Overall Patient Progress: no change

## 2023-03-28 NOTE — PLAN OF CARE
Pt admitted 3/21 with Afib with RVR s/p converted to SR 3/27.  VS'S on RA and pain in left abdomen.  Lasix given in am and will be prn as out-pt.  Increased her scheduled Dilt as well.  Awaiting discharge.  Continue to monitor and with POC.

## 2023-03-28 NOTE — PROGRESS NOTES
DISCHARGE   Discharged to: Home  Via: Automobile  Accompanied by: Family  Discharge Instructions: diet, activity, medications, follow up appointments, when to call the MD, and what to watchout for (i.e. s/s of infection, increasing SOB, palpitations, chest pain,)  Prescriptions: To be filled by pharmacy per pt's request; medication list reviewed & sent with pt  Follow Up Appointments: arranged; information given  Belongings: All sent with pt  IV: out  Telemetry: off and return to tele room  Pt exhibits understanding of above discharge instructions; all questions answered.  Discharge Paperwork: faxed

## 2023-03-28 NOTE — PLAN OF CARE
Occupational Therapy Discharge Summary    Reason for therapy discharge:    Discharged to home.    Progress towards therapy goal(s). See goals on Care Plan in [facility] electronic health record for goal details.  Goals partially met.  Barriers to achieving goals:   discharge from facility.    Therapy recommendation(s):    No further therapy is recommended.

## 2023-03-29 ENCOUNTER — PATIENT OUTREACH (OUTPATIENT)
Dept: CARE COORDINATION | Facility: CLINIC | Age: 88
End: 2023-03-29
Payer: MEDICARE

## 2023-03-29 LAB
ATRIAL RATE - MUSE: 87 BPM
DIASTOLIC BLOOD PRESSURE - MUSE: NORMAL MMHG
INTERPRETATION ECG - MUSE: NORMAL
P AXIS - MUSE: 57 DEGREES
PR INTERVAL - MUSE: 180 MS
QRS DURATION - MUSE: 78 MS
QT - MUSE: 392 MS
QTC - MUSE: 471 MS
R AXIS - MUSE: -32 DEGREES
SYSTOLIC BLOOD PRESSURE - MUSE: NORMAL MMHG
T AXIS - MUSE: 76 DEGREES
VENTRICULAR RATE- MUSE: 87 BPM

## 2023-03-29 NOTE — PROGRESS NOTES
Clinic Care Coordination Contact  [facility]: Post-Discharge Note  SITUATION                                                      Admission:    Admission Date: 03/21/23   Reason for Admission: Primary malignant neoplasm of lung metastatic to other site, unspecified laterality  Discharge:   Discharge Date: 03/28/23  Discharge Diagnosis: Primary malignant neoplasm of lung metastatic to other site, unspecified laterality    BACKGROUND                                                      Per hospital discharge summary and inpatient provider notes:  Barbi Rebolledo was admitted on 3/21/2023 for Afib with RVR.  The following problems were addressed during her hospitalization:     Persistent recurrent atrial fibrillation with rapid ventricular response  Acute hypoxic respiratory failure (1L NC overnight likely BC)  Patient presented with hypoxia and palpitations and was found to have atrial fibrillation with rates in the 140s. CT PE negative. Had not been on anticoagulation for approximately 1 year due to gamma knife procedures. She was started on diltiazem gtt and electrophysiology initially had planned cardioversion but she self-converted to normal sinus rhythm then returned to atrial fibrillation a day later. Currently rates in 120 on PO diltiazem. EP was consulted, her diltiazem was increased to 240mg daily and she had a DCCV on 3/27. Would consider amiodarone if sustained recurrence of afib but at this time continue diltiazem.   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 It may be that this EF is not entirely accurate given patient was in atrial fibrillation at  the time (rates 70s). CXR on 3/27 with atelectasis and pleural effusions. Effusions likely due to new HFmrEF. She received IV diuresis which was transitioned to PO prior to discharge.   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 Most likely attributable to embolic source from underlying arrthymia or possible malignancy hypercoagulable state. Was started on apixaban 2.5mg BID for Afib.      Leukocytosis, resolved: Elevated WBC 15.4 on admission likely caused by reactive inflammation from infarctions, less likely infection.      Hyponatremia secondary to unknown cause: Patient has persistently decreasing sodium levels since admission likely attributable to decreased intake in the setting of nausea and vomiting. She may also be hypervolemic due to heart failure.     Elevated liver enzymes, downtrending: Possibly some hypovolemia vs metastasis, versus infarction of liver given recent atrial fibrillation not on anticoagulation and subsequent renal and splenic infarcts. Downtrending on CMP.      Nocturnal hypoxic respiratory failure most likely secondary to obstructive sleep apnea: Patient has required use of low flow oxygen (2L or less) since being admitted to the hospital with desaturation of 90% while sleeping. Patient denies any symptoms while awake and lying flat and is not requiring oxygen to maintain sats >90%. This is most likely related to undiagnosed obstructive sleep apnea given hypoxia only while sleeping.  - Referred for outpatient sleep study     Metastatic lung adenocarcinoma, follows at [place]. Continue PTA osimertinib. Patient has follow up appointment at [place] on 3/31/23.  "     Hypokalemia: Replacement protocol  Hypomagnesemia: Replacement protocol           Consultations This Hospital Stay      OCCUPATIONAL THERAPY ADULT IP CONSULT  PHYSICAL THERAPY ADULT IP CONSULT  CARDIOLOGY ELECTROPHYSIOLOGY (EP) IP CONSULT  NURSING TO CONSULT FOR VASCULAR ACCESS CARE IP CONSULT  NURSING TO CONSULT FOR VASCULAR ACCESS CARE IP CONSULT  SURGICAL ONCOLOGY ADULT IP CONSULT  PHARMACY IP CONSULT  PHARMACY LIAISON FOR MEDICATION COVERAGE CONSULT  DENTAL HYGIENE IP ADULT CONSULT - [facility]           Code Status      Full Code     The patient was discussed with Dr. Akbar Winston MD  [address] [address]  Phone: [phone]  ______________________________________________________________________           Physical Exam     Vital Signs: Temp: 98  F (36.7  C) Temp src: Oral BP: 108/66 Pulse: 91   Resp: 16 SpO2: 93 % O2 Device: None (Room air) Oxygen Delivery: 1 LPM  Weight: 104 lbs 9.6 oz  General Appearance:  Laying in bed, comfortable appearing  Respiratory: CTAB, normal WOB on room air   Cardiovascular: RRR, warm/well perfused  GI: soft, nontender, nondistended, normal bowel sounds  Skin: no rashes/lesions on exposed skin   Neuro: Awake, alert, oriented, no focal deficits            ASSESSMENT           Discharge Assessment  How are you doing now that you are home?: \" Okay nice to be in my own bed but think I need oxygen for sleeping\"  How are your symptoms? (Red Flag symptoms escalate to triage hotline per guidelines): Improved  Do you feel your condition is stable enough to be safe at home until your provider visit?: Yes  Does the patient have their discharge instructions? : Yes  Does the patient have questions regarding their discharge instructions? : No  Were you started on any new medications or were there changes to any of your previous medications? : Yes  Does the patient have all of their medications?: Yes  Do you have " "questions regarding any of your medications? : No  Do you have all of your needed medical supplies or equipment (DME)?  (i.e. oxygen tank, CPAP, cane, etc.): (S) No - What equipment or supplies are needed? (\" Think I need oxygen for sleeping\")  Discharge follow-up appointment scheduled within 14 calendar days? : No  Discharge Follow Up Appointment Date: 04/27/23  Discharge Follow Up Appointment Scheduled with?: Specialty Care Provider  Is patient agreeable to assistance with scheduling? : No    Post-op ([facility] CTA Only)  If the patient had a surgery or procedure, do they have any questions for a nurse?: No             PLAN                                                      Outpatient Plan:     Your activity upon discharge: activity as tolerated          Monitor and record     Weigh yourself every morning          When to contact your care team     Contact your care team If symptoms get worse, If increased shortness of breath, If waking up at night with difficulty breathing, If unable to lie down for sleep due to symptoms, If weight gain of 2 pounds a day for 2 days in a row OR 5 pounds in 1 week., Increased swelling in your ankles or legs, and Dizziness or lightheadedness          Adult [facility] Follow-up and recommended labs and tests     Follow up with primary care provider, Cristina Balderas, in 2-3 weeks. Follow up with Electrophysiology in 1 month, oncology as scheduled, and schedule a sleep study.      Appointments on [place] and/or [facility] (with [facility] or [facility] provider or service). Call [phone] if you haven't heard regarding these appointments within 7 days of discharge.          Diet     Follow this diet upon discharge: regul         Future Appointments   Date Time Provider Department Center   4/27/2023  2:00 PM Kaylah Dyer PA-C [facility]   5/22/2023  2:30 PM Brian Coulter MD [facility]   8/15/2023  8:10 AM Laz Gasca MD [facility]         For any urgent concerns, please contact " our 24 hour nurse triage line: [phone] ([phone])         Darlene Sumner MA

## 2023-03-30 ENCOUNTER — DOCUMENTATION ONLY (OUTPATIENT)
Dept: OTHER | Facility: CLINIC | Age: 88
End: 2023-03-30
Payer: MEDICARE

## 2023-03-31 ENCOUNTER — TELEPHONE (OUTPATIENT)
Dept: SLEEP MEDICINE | Facility: CLINIC | Age: 88
End: 2023-03-31
Payer: MEDICARE

## 2023-04-03 ENCOUNTER — DOCUMENTATION ONLY (OUTPATIENT)
Dept: OTHER | Facility: CLINIC | Age: 88
End: 2023-04-03
Payer: MEDICARE

## 2023-04-03 ENCOUNTER — TELEPHONE (OUTPATIENT)
Dept: CARDIOLOGY | Facility: CLINIC | Age: 88
End: 2023-04-03
Payer: MEDICARE

## 2023-04-03 DIAGNOSIS — I48.0 PAROXYSMAL ATRIAL FIBRILLATION (H): Primary | ICD-10-CM

## 2023-04-03 RX ORDER — METOPROLOL TARTRATE 25 MG/1
12.5 TABLET, FILM COATED ORAL 2 TIMES DAILY
Qty: 90 TABLET | Refills: 3 | Status: ON HOLD | OUTPATIENT
Start: 2023-04-03 | End: 2023-04-12

## 2023-04-03 NOTE — TELEPHONE ENCOUNTER
RX sent    When I talked with patient's son he said I could follow up by mycfreyat as he has proxy.    Levar sent with reply from Dr. Bains

## 2023-04-03 NOTE — TELEPHONE ENCOUNTER
Patient likely back in atrial fibrillation.  I will send her metoprolol 12.5 mg twice daily.  Also recommend a Zio patch for a week which can be done in the cardiology clinic.  I will review the results.    DR.CAN

## 2023-04-03 NOTE — TELEPHONE ENCOUNTER
Health Call Center    Phone Message    May a detailed message be left on voicemail: yes     Reason for Call: Other: Patient would like to speak with the nurse about an elevated pulse of over 100. This has happened twice overnight with some shortness of breath. Please call patient to discuss.      Action Taken: Other: cardiology    Travel Screening: Not Applicable   Thank you!  [facility]

## 2023-04-04 ENCOUNTER — TELEPHONE (OUTPATIENT)
Dept: CARDIOLOGY | Facility: CLINIC | Age: 88
End: 2023-04-04
Payer: MEDICARE

## 2023-04-04 NOTE — TELEPHONE ENCOUNTER
Health Call Center    Phone Message    May a detailed message be left on voicemail: yes     Reason for Call: Other: Pt and daughter calling back in regards to message they recieved yesterday about pt getting an event monitor. Pt would like to get a monitor right away but instead of waiting for one to arrive in the mail they are wondering if pt's daughter would be able to come to the [facility] and  a monitor since pt has limited mobility at this time. Please return call to discuss if this can be done or to coordinate having one mailed out per previous conversation.     Action Taken: Other: Cardiology    Travel Screening: Not Applicable     Thank you!  [facility]

## 2023-04-05 DIAGNOSIS — I48.0 PAROXYSMAL ATRIAL FIBRILLATION (H): Primary | ICD-10-CM

## 2023-04-07 ENCOUNTER — TELEPHONE (OUTPATIENT)
Dept: CARDIOLOGY | Facility: CLINIC | Age: 88
End: 2023-04-07
Payer: MEDICARE

## 2023-04-07 NOTE — TELEPHONE ENCOUNTER
Spoke with pt and her son. Stated Dr. Bains is recommending pt go to ER. Pt is hesitant to do so but reports she will go in. She has not yet taken her Metoprolol today. Instructed pt to hold off for now and get recommendations from ER provider.     Roshan Hobson, RN   Cardiology Nurse Coordinator

## 2023-04-07 NOTE — TELEPHONE ENCOUNTER
I think she should come to ER. If she denies to come in, then should stop and at least be seen in urgent care or PCP asap.    DR.CAN

## 2023-04-07 NOTE — TELEPHONE ENCOUNTER
[facility] Call Center    Phone Message    May a detailed message be left on voicemail: yes     Reason for Call: Symptoms or Concerns     If patient has red-flag symptoms, warm transfer to triage line    Current symptom or concern: Listless and exhausted  Since she started the Metoprolol                Elevated HR in the 140's    Symptoms have been present for:  Since Tuesday    Has patient previously been seen for this? Yes for heart rate     By Dr Webber: [facility] ER     Date: 3/21/2023 - 3/28/2023    Are there any new or worsening symptoms? Yes: The Listlessness     Patient would like to know if she needs to be admitted?  Also if she should continue this medication?      Action Taken: Other: Cardiology    Travel Screening: Not Applicable     Thank you!  [facility]

## 2023-04-09 ENCOUNTER — HOSPITAL ENCOUNTER (INPATIENT)
Facility: CLINIC | Age: 88
LOS: 3 days | Discharge: HOME | DRG: 291 | End: 2023-04-12
Attending: EMERGENCY MEDICINE | Admitting: INTERNAL MEDICINE
Payer: MEDICARE

## 2023-04-09 ENCOUNTER — APPOINTMENT (OUTPATIENT)
Dept: GENERAL RADIOLOGY | Facility: CLINIC | Age: 88
DRG: 291 | End: 2023-04-09
Attending: EMERGENCY MEDICINE
Payer: MEDICARE

## 2023-04-09 ENCOUNTER — HEALTH MAINTENANCE LETTER (OUTPATIENT)
Age: 88
End: 2023-04-09

## 2023-04-09 DIAGNOSIS — I48.0 PAROXYSMAL ATRIAL FIBRILLATION (H): ICD-10-CM

## 2023-04-09 DIAGNOSIS — I50.23 ACUTE ON CHRONIC SYSTOLIC CONGESTIVE HEART FAILURE (H): ICD-10-CM

## 2023-04-09 DIAGNOSIS — I11.0 HYPERTENSIVE HEART DISEASE WITH CONGESTIVE HEART FAILURE, UNSPECIFIED HEART FAILURE TYPE (H): ICD-10-CM

## 2023-04-09 DIAGNOSIS — J90 EXUDATIVE PLEURISY: ICD-10-CM

## 2023-04-09 DIAGNOSIS — I50.23 ACUTE ON CHRONIC SYSTOLIC HEART FAILURE (H): Primary | ICD-10-CM

## 2023-04-09 DIAGNOSIS — Z79.01 LONG TERM (CURRENT) USE OF ANTICOAGULANTS: ICD-10-CM

## 2023-04-09 DIAGNOSIS — I48.91 ATRIAL FIBRILLATION WITH RAPID VENTRICULAR RESPONSE (H): ICD-10-CM

## 2023-04-09 DIAGNOSIS — E87.1 HYPONATREMIA: ICD-10-CM

## 2023-04-09 DIAGNOSIS — J90 BILATERAL PLEURAL EFFUSION: ICD-10-CM

## 2023-04-09 LAB
ALBUMIN SERPL BCG-MCNC: 3.8 G/DL (ref 3.5–5.2)
ALBUMIN UR-MCNC: 30 MG/DL
ALP SERPL-CCNC: 135 U/L (ref 35–104)
ALT SERPL W P-5'-P-CCNC: 17 U/L (ref 10–35)
ANION GAP SERPL CALCULATED.3IONS-SCNC: 12 MMOL/L (ref 7–15)
APPEARANCE UR: CLEAR
AST SERPL W P-5'-P-CCNC: 29 U/L (ref 10–35)
BASOPHILS # BLD AUTO: 0 10E3/UL (ref 0–0.2)
BASOPHILS NFR BLD AUTO: 0 %
BILIRUB SERPL-MCNC: 0.5 MG/DL
BILIRUB UR QL STRIP: NEGATIVE
BUN SERPL-MCNC: 13.3 MG/DL (ref 8–23)
CALCIUM SERPL-MCNC: 9.3 MG/DL (ref 8.2–9.6)
CHLORIDE SERPL-SCNC: 97 MMOL/L (ref 98–107)
COLOR UR AUTO: YELLOW
CREAT SERPL-MCNC: 0.69 MG/DL (ref 0.51–0.95)
DEPRECATED HCO3 PLAS-SCNC: 21 MMOL/L (ref 22–29)
EOSINOPHIL # BLD AUTO: 0.1 10E3/UL (ref 0–0.7)
EOSINOPHIL NFR BLD AUTO: 2 %
ERYTHROCYTE [DISTWIDTH] IN BLOOD BY AUTOMATED COUNT: 13.9 % (ref 10–15)
GFR SERPL CREATININE-BSD FRML MDRD: 81 ML/MIN/1.73M2
GLUCOSE SERPL-MCNC: 103 MG/DL (ref 70–99)
GLUCOSE UR STRIP-MCNC: NEGATIVE MG/DL
HCT VFR BLD AUTO: 38.7 % (ref 35–47)
HGB BLD-MCNC: 12.8 G/DL (ref 11.7–15.7)
HGB UR QL STRIP: NEGATIVE
HOLD SPECIMEN: NORMAL
IMM GRANULOCYTES # BLD: 0 10E3/UL
IMM GRANULOCYTES NFR BLD: 0 %
KETONES UR STRIP-MCNC: ABNORMAL MG/DL
LACTATE SERPL-SCNC: 0.9 MMOL/L (ref 0.7–2)
LEUKOCYTE ESTERASE UR QL STRIP: ABNORMAL
LIPASE SERPL-CCNC: 21 U/L (ref 13–60)
LYMPHOCYTES # BLD AUTO: 0.6 10E3/UL (ref 0.8–5.3)
LYMPHOCYTES NFR BLD AUTO: 11 %
MAGNESIUM SERPL-MCNC: 2 MG/DL (ref 1.7–2.3)
MCH RBC QN AUTO: 30.2 PG (ref 26.5–33)
MCHC RBC AUTO-ENTMCNC: 33.1 G/DL (ref 31.5–36.5)
MCV RBC AUTO: 91 FL (ref 78–100)
MONOCYTES # BLD AUTO: 0.5 10E3/UL (ref 0–1.3)
MONOCYTES NFR BLD AUTO: 9 %
MUCOUS THREADS #/AREA URNS LPF: PRESENT /LPF
NEUTROPHILS # BLD AUTO: 4.2 10E3/UL (ref 1.6–8.3)
NEUTROPHILS NFR BLD AUTO: 78 %
NITRATE UR QL: NEGATIVE
NRBC # BLD AUTO: 0 10E3/UL
NRBC BLD AUTO-RTO: 0 /100
NT-PROBNP SERPL-MCNC: ABNORMAL PG/ML (ref 0–1800)
PH UR STRIP: 5.5 [PH] (ref 5–7)
PLATELET # BLD AUTO: 228 10E3/UL (ref 150–450)
POTASSIUM SERPL-SCNC: 4.8 MMOL/L (ref 3.4–5.3)
PROT SERPL-MCNC: 6.5 G/DL (ref 6.4–8.3)
RBC # BLD AUTO: 4.24 10E6/UL (ref 3.8–5.2)
RBC URINE: 4 /HPF
SODIUM SERPL-SCNC: 130 MMOL/L (ref 136–145)
SP GR UR STRIP: 1.03 (ref 1–1.03)
SQUAMOUS EPITHELIAL: 1 /HPF
TRANSITIONAL EPI: <1 /HPF
TROPONIN T SERPL HS-MCNC: 24 NG/L
UROBILINOGEN UR STRIP-MCNC: NORMAL MG/DL
WBC # BLD AUTO: 5.5 10E3/UL (ref 4–11)
WBC URINE: 9 /HPF

## 2023-04-09 PROCEDURE — 214N000001 HC R&B CCU UMMC

## 2023-04-09 PROCEDURE — 93010 ELECTROCARDIOGRAM REPORT: CPT | Performed by: EMERGENCY MEDICINE

## 2023-04-09 PROCEDURE — 83690 ASSAY OF LIPASE: CPT | Performed by: EMERGENCY MEDICINE

## 2023-04-09 PROCEDURE — 99291 CRITICAL CARE FIRST HOUR: CPT | Mod: 25 | Performed by: EMERGENCY MEDICINE

## 2023-04-09 PROCEDURE — 258N000003 HC RX IP 258 OP 636: Performed by: NURSE PRACTITIONER

## 2023-04-09 PROCEDURE — 71045 X-RAY EXAM CHEST 1 VIEW: CPT | Mod: 26 | Performed by: RADIOLOGY

## 2023-04-09 PROCEDURE — 250N000013 HC RX MED GY IP 250 OP 250 PS 637: Performed by: NURSE PRACTITIONER

## 2023-04-09 PROCEDURE — 80053 COMPREHEN METABOLIC PANEL: CPT | Performed by: EMERGENCY MEDICINE

## 2023-04-09 PROCEDURE — 71045 X-RAY EXAM CHEST 1 VIEW: CPT

## 2023-04-09 PROCEDURE — 250N000011 HC RX IP 250 OP 636: Performed by: NURSE PRACTITIONER

## 2023-04-09 PROCEDURE — 93005 ELECTROCARDIOGRAM TRACING: CPT | Performed by: EMERGENCY MEDICINE

## 2023-04-09 PROCEDURE — 96374 THER/PROPH/DIAG INJ IV PUSH: CPT | Performed by: EMERGENCY MEDICINE

## 2023-04-09 PROCEDURE — 36415 COLL VENOUS BLD VENIPUNCTURE: CPT | Performed by: EMERGENCY MEDICINE

## 2023-04-09 PROCEDURE — 83880 ASSAY OF NATRIURETIC PEPTIDE: CPT | Performed by: EMERGENCY MEDICINE

## 2023-04-09 PROCEDURE — 83735 ASSAY OF MAGNESIUM: CPT | Performed by: EMERGENCY MEDICINE

## 2023-04-09 PROCEDURE — 250N000011 HC RX IP 250 OP 636: Performed by: EMERGENCY MEDICINE

## 2023-04-09 PROCEDURE — 83605 ASSAY OF LACTIC ACID: CPT | Performed by: EMERGENCY MEDICINE

## 2023-04-09 PROCEDURE — 250N000011 HC RX IP 250 OP 636

## 2023-04-09 PROCEDURE — 99222 1ST HOSP IP/OBS MODERATE 55: CPT | Mod: 24 | Performed by: NURSE PRACTITIONER

## 2023-04-09 PROCEDURE — 81001 URINALYSIS AUTO W/SCOPE: CPT | Performed by: EMERGENCY MEDICINE

## 2023-04-09 PROCEDURE — 85025 COMPLETE CBC W/AUTO DIFF WBC: CPT | Performed by: EMERGENCY MEDICINE

## 2023-04-09 PROCEDURE — 84484 ASSAY OF TROPONIN QUANT: CPT | Performed by: EMERGENCY MEDICINE

## 2023-04-09 RX ORDER — ASPIRIN 81 MG/1
324 TABLET, CHEWABLE ORAL ONCE
Status: COMPLETED | OUTPATIENT
Start: 2023-04-09 | End: 2023-04-09

## 2023-04-09 RX ORDER — ACETAMINOPHEN 650 MG/1
650 SUPPOSITORY RECTAL EVERY 4 HOURS PRN
Status: DISCONTINUED | OUTPATIENT
Start: 2023-04-09 | End: 2023-04-12 | Stop reason: HOSPADM

## 2023-04-09 RX ORDER — AMIODARONE HYDROCHLORIDE 200 MG/1
200 TABLET ORAL 2 TIMES DAILY
Status: DISCONTINUED | OUTPATIENT
Start: 2023-04-10 | End: 2023-04-12 | Stop reason: HOSPADM

## 2023-04-09 RX ORDER — FUROSEMIDE 10 MG/ML
40 INJECTION INTRAMUSCULAR; INTRAVENOUS ONCE
Status: COMPLETED | OUTPATIENT
Start: 2023-04-09 | End: 2023-04-09

## 2023-04-09 RX ORDER — DILTIAZEM HYDROCHLORIDE 120 MG/1
240 CAPSULE, COATED, EXTENDED RELEASE ORAL DAILY
Status: DISCONTINUED | OUTPATIENT
Start: 2023-04-10 | End: 2023-04-10

## 2023-04-09 RX ORDER — AMIODARONE HYDROCHLORIDE 200 MG/1
200 TABLET ORAL DAILY
Status: DISCONTINUED | OUTPATIENT
Start: 2023-05-01 | End: 2023-04-12 | Stop reason: HOSPADM

## 2023-04-09 RX ORDER — FUROSEMIDE 10 MG/ML
20 INJECTION INTRAMUSCULAR; INTRAVENOUS ONCE
Status: COMPLETED | OUTPATIENT
Start: 2023-04-09 | End: 2023-04-09

## 2023-04-09 RX ORDER — ACETAMINOPHEN 325 MG/1
650 TABLET ORAL EVERY 4 HOURS PRN
Status: DISCONTINUED | OUTPATIENT
Start: 2023-04-09 | End: 2023-04-12 | Stop reason: HOSPADM

## 2023-04-09 RX ORDER — MAGNESIUM HYDROXIDE/ALUMINUM HYDROXICE/SIMETHICONE 120; 1200; 1200 MG/30ML; MG/30ML; MG/30ML
30 SUSPENSION ORAL EVERY 4 HOURS PRN
Status: DISCONTINUED | OUTPATIENT
Start: 2023-04-09 | End: 2023-04-12 | Stop reason: HOSPADM

## 2023-04-09 RX ORDER — LIDOCAINE 40 MG/G
CREAM TOPICAL
Status: DISCONTINUED | OUTPATIENT
Start: 2023-04-09 | End: 2023-04-12 | Stop reason: HOSPADM

## 2023-04-09 RX ORDER — DILTIAZEM HYDROCHLORIDE 5 MG/ML
INJECTION INTRAVENOUS
Status: COMPLETED
Start: 2023-04-09 | End: 2023-04-09

## 2023-04-09 RX ORDER — DILTIAZEM HYDROCHLORIDE 5 MG/ML
15 INJECTION INTRAVENOUS ONCE
Status: COMPLETED | OUTPATIENT
Start: 2023-04-09 | End: 2023-04-09

## 2023-04-09 RX ORDER — NITROGLYCERIN 0.4 MG/1
0.4 TABLET SUBLINGUAL EVERY 5 MIN PRN
Status: DISCONTINUED | OUTPATIENT
Start: 2023-04-09 | End: 2023-04-12 | Stop reason: HOSPADM

## 2023-04-09 RX ADMIN — AMIODARONE HYDROCHLORIDE 150 MG: 1.5 INJECTION, SOLUTION INTRAVENOUS at 17:12

## 2023-04-09 RX ADMIN — DILTIAZEM HYDROCHLORIDE 15 MG: 5 INJECTION INTRAVENOUS at 13:54

## 2023-04-09 RX ADMIN — FUROSEMIDE 40 MG: 10 INJECTION, SOLUTION INTRAVENOUS at 16:38

## 2023-04-09 RX ADMIN — AMIODARONE HYDROCHLORIDE 1 MG/MIN: 50 INJECTION, SOLUTION INTRAVENOUS at 17:22

## 2023-04-09 RX ADMIN — FUROSEMIDE 20 MG: 10 INJECTION, SOLUTION INTRAVENOUS at 19:19

## 2023-04-09 RX ADMIN — ASPIRIN 81 MG 324 MG: 81 TABLET ORAL at 19:18

## 2023-04-09 RX ADMIN — APIXABAN 2.5 MG: 2.5 TABLET, FILM COATED ORAL at 19:18

## 2023-04-09 ASSESSMENT — ACTIVITIES OF DAILY LIVING (ADL)
CONCENTRATING,_REMEMBERING_OR_MAKING_DECISIONS_DIFFICULTY: NO
ADLS_ACUITY_SCORE: 37
ADLS_ACUITY_SCORE: 30
ADLS_ACUITY_SCORE: 45
CHANGE_IN_FUNCTIONAL_STATUS_SINCE_ONSET_OF_CURRENT_ILLNESS/INJURY: NO
TOILETING_ISSUES: NO
FALL_HISTORY_WITHIN_LAST_SIX_MONTHS: NO
ADLS_ACUITY_SCORE: 37
EQUIPMENT_CURRENTLY_USED_AT_HOME: WALKER, ROLLING
DIFFICULTY_EATING/SWALLOWING: NO
DOING_ERRANDS_INDEPENDENTLY_DIFFICULTY: YES
WEAR_GLASSES_OR_BLIND: NO
DRESSING/BATHING_DIFFICULTY: NO
ADLS_ACUITY_SCORE: 37
ADLS_ACUITY_SCORE: 37
WALKING_OR_CLIMBING_STAIRS_DIFFICULTY: NO

## 2023-04-09 NOTE — ED PROVIDER NOTES
"    [place] EMERGENCY DEPARTMENT ([facility])    4/09/23       ED PROVIDER NOTE    History     Chief Complaint   Patient presents with     Tachycardia     The history is provided by the patient, medical records and a relative.     Barbi Rebolledo is a 89 year old female with past medical history significant for paroxysmal atrial fibrillation on Eliquis s/p cardioversion 03/27/2023, metastatic lung adenocarcinoma on tagrisso maintenance chemo, HTN, TIA who presents to the ED for evaluation of tachycardia in the 140s.  The patient is accompanied by her son who helps to supplement the history.  The patient was recently admitted here for A-fib with RVR and underwent cardioversion 03/27 and discharged on Eliquis 2.5 mg twice daily.  The patient's son said that she has been feeling more short of breath with tachycardia as high as 140s for the last week.  They reached out to cardiology who started the patient on metoprolol 12.5 mg twice daily.  The patient took her first dose Tuesday night 4/4 and took it until Friday then stopped due to feeling fatigued with a slight cough that she felt was related to the metoprolol.  They were recommended to go to the ER on Friday 4/7 but the patient started to feel slightly improved on Saturday.  Today, her symptoms are not necessarily worse but are not changing or getting better so they came to the ED.  Here, the patient says it feels like it is \"hard to get enough air\".  She has been feeling dizzy with a little bit of cough.  She denies fever, chest pain, or abdominal pain.  She has some bilateral leg swelling and was given Lasix on discharge and told to take it if she gains 5 pounds in 1 week but she has only gained 2 pounds this week so she has not taken it.  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  The patient states she was on Eliquis years ago.  This " "was stopped after she was diagnosed with lung cancer.  She believes she restarted it around March 21.    Social history: Here with her son    Past Medical History  Past Medical History:   Diagnosis Date     Squamous cell carcinoma      Past Surgical History:   Procedure Laterality Date     ANESTHESIA CARDIOVERSION N/A 3/27/2023    Procedure: Anesthesia cardioversion;  Surgeon: GENERIC ANESTHESIA PROVIDER;  Location: [facility] OR     MOHS MICROGRAPHIC PROCEDURE       apixaban ANTICOAGULANT (ELIQUIS) 2.5 MG tablet  diltiazem ER COATED BEADS (CARDIZEM CD/CARTIA XT) 240 MG 24 hr capsule  emollient (VANICREAM) cream  furosemide (LASIX) 20 MG tablet  metoprolol tartrate (LOPRESSOR) 25 MG tablet  osimertinib (TAGRISSO) 80 MG tablet      Allergies   Allergen Reactions     Brevital Sodium [Methohexital] Fatigue     Was aware of surroundings but unable to move, gave her significant anxiety after given for DCCV 3 hours after administered 30g     Fentanyl Other (See Comments)     Sedation     Lisinopril Swelling     Swelling of tongue     Metoprolol Dizziness and Cough     Novocain [Procaine] Other (See Comments)     Tachycardia     Family History  Family History   Problem Relation Age of Onset     Cancer No family hx of         no skin cancer     Skin Cancer No family hx of         no skin cancer     Social History   Social History     Tobacco Use     Smoking status: Never     Smokeless tobacco: Never      Past medical history, past surgical history, medications, allergies, family history, and social history were reviewed with the patient. No additional pertinent items.      A medically appropriate review of systems was performed with pertinent positives and negatives noted in the HPI, and all other systems negative.    Physical Exam   BP: (!) 117/91  Pulse: (!) 121  Temp: 97.8  F (36.6  C)  Resp: 20  Height: 157.5 cm (5' 2\")  Weight: 47.6 kg (105 lb)  SpO2: 94 %  Physical Exam  Physical Exam   Constitutional: Elderly female, lying " flat on back with no apparent respiratory difficulty  HENT:   Head: Normocephalic and atraumatic.   Eyes: Conjunctivae are normal. Pupils are equal, round, and reactive to light.    pharynx has no erythema or exudate, mucous membranes are moist  Neck:   no adenopathy, no bony tenderness  Cardiovascular: irregular rate and rhythm, tachycardia without murmurs or gallops  Pulmonary/Chest: Decreased breath sounds both bases, with no wheezes or retractions.mild respiratory distress.  GI: Soft with good bowel sounds.  Non-tender, non-distended, with no guarding, no rebound, no peritoneal signs.   Back:  No bony or CVA tenderness   Musculoskeletal: 1 to 2 mm pitting edema  Skin: Skin is warm and dry. No rash noted.   Neurological: alert and oriented to person, place, and time. Nonfocal exam  Psychiatric:  normal mood and affect.     ED Course, Procedures, & Data     1:10 PM  The patient was seen and examined by Kendal Guerrero MD in Room ED05.     Procedures                   EKG Interpretation:      Interpreted by Kendal Guerrero MD  Time reviewed:1315 pm   Symptoms at time of EKG: see hpi   Rhythm: Normal sinus  and Atrial fibrillation - rapid  Rate: 130-140  Axis: Normal  Ectopy: None  Conduction: Normal  ST Segments/ T Waves: T wave inversion V2, V3, V4, V5 and V6  Q Waves: v1, v2 and v3  Comparison to prior: Unchanged from 3/28/2023; normal sinus rhythm with premature supraventricular complexes, T wave inversions in the lateral leads.    Clinical Impression: Atrial fibrillation with RVR, rate of 136 bpm with T wave inversions in the lateral leads and septal Q waves.              Results for orders placed or performed during the hospital encounter of 04/09/23   XR Chest Port 1 View     Status: None    Narrative    XR CHEST PORT 1 VIEW  4/9/2023 1:32 PM      HISTORY: atrial fibrillation    COMPARISON: Chest x-ray 3/27/2023, 3/23/2023.     FINDINGS:   Portable AP semiupright chest x-ray.    Trachea is midline. Cardiac  silhouette is enlarged. Pulmonary  vasculature is indistinct. Increased bilateral mixed interstitial and  airspace opacities. Moderate left and small right pleural effusions,  increased. Opacity over the right upper lobe felt to be the patient's  known mass. No pneumothorax. Aortic arch calcifications. Prominent  skin fold on the left.    Bony structures appear intact.      Impression    IMPRESSION:   1.  Cardiomegaly with increased perihilar opacities/pulmonary edema.  2.  Increased moderate left and small right pleural effusions.  3.  Changed opacity over the right upper lobe felt to be the patient's  known mass.    I have personally reviewed the examination and initial interpretation  and I agree with the findings.    JEROD DAMON MD         SYSTEM ID:  [id number]   Comprehensive metabolic panel     Status: Abnormal   Result Value Ref Range    Sodium 130 (L) 136 - 145 mmol/L    Potassium 4.8 3.4 - 5.3 mmol/L    Chloride 97 (L) 98 - 107 mmol/L    Carbon Dioxide (CO2) 21 (L) 22 - 29 mmol/L    Anion Gap 12 7 - 15 mmol/L    Urea Nitrogen 13.3 8.0 - 23.0 mg/dL    Creatinine 0.69 0.51 - 0.95 mg/dL    Calcium 9.3 8.2 - 9.6 mg/dL    Glucose 103 (H) 70 - 99 mg/dL    Alkaline Phosphatase 135 (H) 35 - 104 U/L    AST 29 10 - 35 U/L    ALT 17 10 - 35 U/L    Protein Total 6.5 6.4 - 8.3 g/dL    Albumin 3.8 3.5 - 5.2 g/dL    Bilirubin Total 0.5 <=1.2 mg/dL    GFR Estimate 81 >60 mL/min/1.73m2   Lipase     Status: Normal   Result Value Ref Range    Lipase 21 13 - 60 U/L   Lactic acid whole blood     Status: Normal   Result Value Ref Range    Lactic Acid 0.9 0.7 - 2.0 mmol/L   Troponin T, High Sensitivity     Status: Abnormal   Result Value Ref Range    Troponin T, High Sensitivity 24 (H) <=14 ng/L   Magnesium     Status: Normal   Result Value Ref Range    Magnesium 2.0 1.7 - 2.3 mg/dL   UA with Microscopic reflex to Culture     Status: Abnormal    Specimen: Urine, Clean Catch   Result Value Ref Range    Color Urine Yellow  Colorless, Straw, Light Yellow, Yellow    Appearance Urine Clear Clear    Glucose Urine Negative Negative mg/dL    Bilirubin Urine Negative Negative    Ketones Urine Trace (A) Negative mg/dL    Specific Gravity Urine 1.027 1.003 - 1.035    Blood Urine Negative Negative    pH Urine 5.5 5.0 - 7.0    Protein Albumin Urine 30 (A) Negative mg/dL    Urobilinogen Urine Normal Normal, 2.0 mg/dL    Nitrite Urine Negative Negative    Leukocyte Esterase Urine Small (A) Negative    Mucus Urine Present (A) None Seen /LPF    RBC Urine 4 (H) <=2 /HPF    WBC Urine 9 (H) <=5 /HPF    Squamous Epithelials Urine 1 <=1 /HPF    Transitional Epithelials Urine <1 <=1 /HPF    Narrative    Urine Culture not indicated   CBC with platelets and differential     Status: Abnormal   Result Value Ref Range    WBC Count 5.5 4.0 - 11.0 10e3/uL    RBC Count 4.24 3.80 - 5.20 10e6/uL    Hemoglobin 12.8 11.7 - 15.7 g/dL    Hematocrit 38.7 35.0 - 47.0 %    MCV 91 78 - 100 fL    MCH 30.2 26.5 - 33.0 pg    MCHC 33.1 31.5 - 36.5 g/dL    RDW 13.9 10.0 - 15.0 %    Platelet Count 228 150 - 450 10e3/uL    % Neutrophils 78 %    % Lymphocytes 11 %    % Monocytes 9 %    % Eosinophils 2 %    % Basophils 0 %    % Immature Granulocytes 0 %    NRBCs per 100 WBC 0 <1 /100    Absolute Neutrophils 4.2 1.6 - 8.3 10e3/uL    Absolute Lymphocytes 0.6 (L) 0.8 - 5.3 10e3/uL    Absolute Monocytes 0.5 0.0 - 1.3 10e3/uL    Absolute Eosinophils 0.1 0.0 - 0.7 10e3/uL    Absolute Basophils 0.0 0.0 - 0.2 10e3/uL    Absolute Immature Granulocytes 0.0 <=0.4 10e3/uL    Absolute NRBCs 0.0 10e3/uL   [place] Draw     Status: None    Narrative    The following orders were created for panel order [place] Draw.  Procedure                               Abnormality         Status                     ---------                               -----------         ------                     Extra Blue Top Tube[[id number]]                                                         Extra Red Top  Tube[[id number]]                               Final result                 Please view results for these tests on the individual orders.   Extra Red Top Tube     Status: None   Result Value Ref Range    Hold Specimen [facility]    Nt probnp inpatient (BNP)     Status: Abnormal   Result Value Ref Range    N terminal Pro BNP Inpatient 15,032 (H) 0 - 1,800 pg/mL   EKG 12 lead     Status: None (Preliminary result)   Result Value Ref Range    Systolic Blood Pressure  mmHg    Diastolic Blood Pressure  mmHg    Ventricular Rate 136 BPM    Atrial Rate 141 BPM    OR Interval  ms    QRS Duration 66 ms     ms    QTc 511 ms    P Axis  degrees    R AXIS 65 degrees    T Axis -60 degrees    Interpretation ECG       Atrial fibrillation with rapid ventricular response  Low voltage QRS  Cannot rule out Anteroseptal infarct , age undetermined  T wave abnormality, consider inferolateral ischemia  Abnormal ECG     CBC with platelets differential     Status: Abnormal    Narrative    The following orders were created for panel order CBC with platelets differential.  Procedure                               Abnormality         Status                     ---------                               -----------         ------                     CBC with platelets and d...[[id number]]  Abnormal            Final result                 Please view results for these tests on the individual orders.     Medications   furosemide (LASIX) injection 40 mg (has no administration in time range)   diltiazem (CARDIZEM) injection 15 mg (15 mg Intravenous $Given 4/9/23 6103)     Labs Ordered and Resulted from Time of ED Arrival to Time of ED Departure   COMPREHENSIVE METABOLIC PANEL - Abnormal       Result Value    Sodium 130 (*)     Potassium 4.8      Chloride 97 (*)     Carbon Dioxide (CO2) 21 (*)     Anion Gap 12      Urea Nitrogen 13.3      Creatinine 0.69      Calcium 9.3      Glucose 103 (*)     Alkaline Phosphatase 135 (*)     AST 29      ALT 17      Protein  Total 6.5      Albumin 3.8      Bilirubin Total 0.5      GFR Estimate 81     TROPONIN T, HIGH SENSITIVITY - Abnormal    Troponin T, High Sensitivity 24 (*)    ROUTINE UA WITH MICROSCOPIC REFLEX TO CULTURE - Abnormal    Color Urine Yellow      Appearance Urine Clear      Glucose Urine Negative      Bilirubin Urine Negative      Ketones Urine Trace (*)     Specific Gravity Urine 1.027      Blood Urine Negative      pH Urine 5.5      Protein Albumin Urine 30 (*)     Urobilinogen Urine Normal      Nitrite Urine Negative      Leukocyte Esterase Urine Small (*)     Mucus Urine Present (*)     RBC Urine 4 (*)     WBC Urine 9 (*)     Squamous Epithelials Urine 1      Transitional Epithelials Urine <1     CBC WITH PLATELETS AND DIFFERENTIAL - Abnormal    WBC Count 5.5      RBC Count 4.24      Hemoglobin 12.8      Hematocrit 38.7      MCV 91      MCH 30.2      MCHC 33.1      RDW 13.9      Platelet Count 228      % Neutrophils 78      % Lymphocytes 11      % Monocytes 9      % Eosinophils 2      % Basophils 0      % Immature Granulocytes 0      NRBCs per 100 WBC 0      Absolute Neutrophils 4.2      Absolute Lymphocytes 0.6 (*)     Absolute Monocytes 0.5      Absolute Eosinophils 0.1      Absolute Basophils 0.0      Absolute Immature Granulocytes 0.0      Absolute NRBCs 0.0     NT PROBNP INPATIENT - Abnormal    N terminal Pro BNP Inpatient 15,032 (*)    LIPASE - Normal    Lipase 21     LACTIC ACID WHOLE BLOOD - Normal    Lactic Acid 0.9     MAGNESIUM - Normal    Magnesium 2.0       XR Chest Port 1 View   Final Result   IMPRESSION:    1.  Cardiomegaly with increased perihilar opacities/pulmonary edema.   2.  Increased moderate left and small right pleural effusions.   3.  Changed opacity over the right upper lobe felt to be the patient's   known mass.      I have personally reviewed the examination and initial interpretation   and I agree with the findings.      JEROD DAMON MD            SYSTEM ID:  [id number]              Critical care was performed.   Critical Care Addendum  My initial assessment, based on my review of nursing observations, review of vital signs, focused history, physical exam and 12 lead ECG analysis, established a high suspicion that Barbi Rebolledo has a critical arrhythmia, which requires immediate intervention, and therefore she is critically ill.     After the initial assessment, the care team initiated multiple lab tests and initiated medication therapy with IV diltiazem and IV lasix to provide stabilization care. Due to the critical nature of this patient, I reassessed nursing observations, vital signs, physical exam, 12 lead ECG analysis and respiratory status multiple times prior to her disposition.     Time also spent performing documentation, discussion with family to obtain medical information for decision making, reviewing test results, discussion with consultants and coordination of care.     Critical care time (excluding teaching time and procedures): 50 minutes.     Assessment & Plan      I have reviewed the nursing notes.   Emergency Department course:  The patient was seen and examined at 1320 pm.    Per chart review, patient was admitted at [facility] 03/21/2023 - 03/28/2023 with afib + RVR.  She acutely developed palpitations and dyspnea 3/21.  Started on diltiazem gtt in the ED. EP consulted. Initially planned to do IDALMIS with DCCV but 3/22 PM converted to sinus rhythm. She did have worsening oxygen requirement 3/23 and CXR demonstrated increased bilatearal effusions and perihilar opacities concerning for edema. Oxygen requirements improved with diuresis but 3/25 AM afib recurred. Additionally the patient developed LUQ pain and CT scan revealed splenic and renal infacts. Heparin gtt transitioned to apixaban 3/26.  S/p successful DCCV 3/27. Her diltiazem dose was increased. She will follow up with EP in 1 month. TTE showing EF 45-50%, moderate mitral insufficiency, and mild/moderate tricuspid  insufficiency. It may be that this EF is not entirely accurate given patient was in atrial fibrillation at the time (rates 70s). CXR on 3/27 with atelectasis and pleural effusions. Effusions likely due to new HFmrEF. She received IV diuresis which was transitioned to PO prior to discharge.    EKG today shows Atrial fibrillation with RVR, rate of 136 bpm with T wave inversions in the lateral leads and septal Q waves.  Portable chest x-ray shows IMPRESSION:   1.  Cardiomegaly with increased perihilar opacities/pulmonary edema.  2.  Increased moderate left and small right pleural effusions.    The patient is in atrial fibrillation with RVR.  I treated her with diltiazem IV, which slowed her rate to the 120s.    Laboratory studies show an unremarkable CBC, with a WBC of 5.5.   Comprehensive metabolic panel shows hyponatremia, with a sodium of 130.  Carbon dioxide is low at 21.  Alkaline phosphatase is elevated at 135.  Lipase is normal.  Magnesium is 2.0 normal.  Lactic acid is 0.9.  UA is LCE positive with 9 WBCs.  Troponin T is elevated at 24.  BNP is quite elevated at 15,032 (last 683 on 3/23/23) concerning for worsening congestive heart failure.    Barbi Rebolledo is a 89 year old female with a history of metastatic lung cancer and atrial fibrillation who presents with atrial fibrillation with RVR.  She is dyspneic.  Chest x-ray shows pulmonary edema as well as worsening bilateral pleural effusions.  She appears to be in worsening congestive heart failure.  I have a low suspicion for pulmonary embolus given her anticoagulation with Eliquis.  I treated her with Lasix IV as well as diltiazem IV.  She will be admitted to the cardiology service under the care of Dr. Gonzalez.     I have reviewed the findings, diagnosis, plan and need for follow up with the patient.    New Prescriptions    No medications on file       Final diagnoses:   Atrial fibrillation with rapid ventricular response (H)   Bilateral pleural effusion    Hyponatremia   Acute on chronic systolic congestive heart failure (H)   I, Aaliyah Munguia, am serving as a trained medical scribe to document services personally performed by Kendal Guerrero MD based on the provider's statements to me on April 9, 2023.  This document has been checked and approved by the attending provider.    I, Kendal Guerrero MD, was physically present and have reviewed and verified the accuracy of this note documented by Aaliyah Munguia, medical scribe.      This note was created in part by the use of Dragon voice recognition dictation system. Inadvertent grammatical errors and typographical errors may still exist.  MD Kendal Catherine  [facility] EMERGENCY DEPARTMENT  4/9/2023     Kendal Guerrero MD  04/09/23 [id number]

## 2023-04-09 NOTE — ED TRIAGE NOTES
Stated that she used her pulse ox and had HR in the 140s, verified by icardia at home EKG. Endorses shortness of breath, and had 2+ pitting edema in BLE. PENNY appears to be in A. Fib, EKG ordered.      Triage Assessment     Row Name 04/09/23 2551       Triage Assessment (Adult)    Airway WDL WDL       Respiratory WDL    Respiratory WDL X  SOB       Skin Circulation/Temperature WDL    Skin Circulation/Temperature WDL WDL       Cardiac WDL    Cardiac WDL X       Peripheral/Neurovascular WDL    Peripheral Neurovascular WDL X  diminshed pulses in BLE, 2+ pitting       Cognitive/Neuro/Behavioral WDL    Cognitive/Neuro/Behavioral WDL WDL

## 2023-04-09 NOTE — H&P
[facility]   Cardiology Service  History and Physical      Barbi Rebolledo MRN# [id number]   YOB: 1930 Age: 89 year old       Admission Date: 4/9/2023  Admission Team: Aneta Geiger      Assessment and plan:   Ms. 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 Now admitted with recurrent AF with RVR and hypervolemia.     Paroxsymal Atrial Fibrillation:   1. Stroke Prophylaxis:  CHADSVASC=++age, +gender, ++TIA, +HTN  6, corresponding to a 9.8% annual stroke / systemic emolism event rate. indicating need for long term oral anticoagulation.  She was started on Eliquis (in 3/2023) now appropriately at 2.5 mg BID. She had some history of hemoptysis so will need to monitor tolerance. Thus far is tolerating well.   2. Rate Control: Continue Diltiazem at 240 mg daily. Continue Metoprolol 12.5 mg BID.   3. Rhythm Control: S/p DCCV 3/27/23. Does have history of PAF. Given her co-morbidities, amiodarone is likely the best option should AAT be needed (acknowledge that LFTs have had mild elevation). Could possibly consider low dose flecainide as well. Given early recurrence after DCCV favor starting amiodarone. Will give  mg bolus followed by 1mg/min for 6 hours and then 0.5mg/min for 18 hours. Then will transition to softer oral loading of 200 mg BID for 3 weeks followed by 200 mg daily thereafter.  DCCV if no chemical conversion.   4. Risk Factor Management: Statin, BP control, and BC evaluation (has been referred for outpt sleep study on last admission).    Hypervolemia:  1. Diuresis- IV Lasix 20 mg X1--> assess response and can redose or start oral diuretics pending    Hyponatremia:   1. Follow labs  2. Diuresis as  above    Stage IV adenocarcinoma of right lun. Continue PTA osimertinib.   2. Follows at [place] for this.     Chronic medical conditions  Hypertension  Hx of TIA  Elevated LFT- lab stable    Clinically Significant Risk Factors Present on Admission               # Drug Induced Coagulation Defect: home medication list includes an anticoagulant medication      Cardiovascular: Cardiac Arrhythmia: Atrial fibrillation: Paroxysmal    Fluid & Electrolyte Disorders: Fluid overload, unspecified    Hematology/Oncology:  stage IV adenocarcinoma of right lung      FEN: Cardiac Diet  Code status: Full Code  Prophylaxis:  on oral anticoagulation  Isolation: None  Disposition: anticipate home in 2-3 days pending AF and volume management      ==========================================================================================  Chief Complaint: Atrial Fibrillaiton    HPI:   Ms. Rebolledo is a 89 year old female who has a medical history significant for stage IV adenocarcinoma of right lung, PAF (CHADSVASC 6 on Eliquis) s/p DCCV 3/27/23, hemoptysis, pericardial effusion s/p pericardiocentesis 2022 (cytology negative), splenic and renal infarcts (felt cardioembolic), HTN, TIA, and c/f BC.     She has a prior history of PAF for a number of years. She was admitted on 3/21/23 with AF with RVR. She had acutely developed palpitations and dyspnea that day. Previously, with these symptoms she would take PO diltiazem and resolve; however, when this one persisted she came in for evaluation. She was started on IV diltiazem and spontaneously converted.  Echo had shown LVEF 45-50% on 3/22/23. She recurred and was then cleared to start anticoagulation again after new splenic and renal infarcts. She was having some hypotension with the AF. She then underwent IDALMIS/DCCV on 3/27/23. Her BPs have improved post DCCV and she is maintaining sinus. She reports feeling improved. Discussed at that time course of amiodarone, but she favored to defer AAT  at that time. She called into Cardiology clinic on 4/3/23 reporting elevated pulse rates with some shortness of breath. She was placed on Metoprolol. She then called back to Cardiology clinic on 4/7/23 reporting feeling listless and exhausted. Her HRs were reported to be 140 bpm. She was recommended to go into ER for evaluation. She now presents to [facility] today (4/9/23) with ongoing tachycardia, SB, and pitting edema. ECG shows she is in AF with  bpm. Labs notable for hyponatremia, known elevated alk phos, and normal renal function. BP and oxygenation stable. In [facility], she was given IV diltiazem bolus. She is being admitted for AF and hypervolemia management.     Past Medical History:   Diagnosis Date     Squamous cell carcinoma        Past Surgical History:   Procedure Laterality Date     ANESTHESIA CARDIOVERSION N/A 3/27/2023    Procedure: Anesthesia cardioversion;  Surgeon: GENERIC ANESTHESIA PROVIDER;  Location: [facility] OR     MOHS MICROGRAPHIC PROCEDURE         No current facility-administered medications on file prior to encounter.  apixaban ANTICOAGULANT (ELIQUIS) 2.5 MG tablet, Take 1 tablet (2.5 mg) by mouth 2 times daily  diltiazem ER COATED BEADS (CARDIZEM CD/CARTIA XT) 240 MG 24 hr capsule, Take 1 capsule (240 mg) by mouth daily  emollient (VANICREAM) cream, Apply topically daily to entire body, especially after bathing.  furosemide (LASIX) 20 MG tablet, Take 40mg if you gain 3 pounds in 1 day or 5 pounds in 7 days.  metoprolol tartrate (LOPRESSOR) 25 MG tablet, Take 0.5 tablets (12.5 mg) by mouth 2 times daily  osimertinib (TAGRISSO) 80 MG tablet, Take 80 mg by mouth daily        Family History   Problem Relation Age of Onset     Cancer No family hx of         no skin cancer     Skin Cancer No family hx of         no skin cancer       Social History     Tobacco Use     Smoking status: Never     Smokeless tobacco: Never   Vaping Use     Vaping status: Not on file   Substance Use Topics     Alcohol use: Not  "on file       Allergies   Allergen Reactions     Brevital Sodium [Methohexital] Fatigue     Was aware of surroundings but unable to move, gave her significant anxiety after given for DCCV 3 hours after administered 30g     Fentanyl Other (See Comments)     Sedation     Lisinopril Swelling     Swelling of tongue     Metoprolol Dizziness and Cough     Novocain [Procaine] Other (See Comments)     Tachycardia         ROS:    ROS: 10 point ROS neg other than the symptoms noted above in the HPI.        Physical Examination:  Vitals: BP (!) 117/91   Pulse (!) 121   Temp 97.8  F (36.6  C) (Oral)   Resp 20   Ht 1.575 m (5' 2\")   Wt 47.6 kg (105 lb)   SpO2 94%   BMI 19.20 kg/m    BMI= Body mass index is 19.2 kg/m .    GENERAL APPEARANCE: healthy, alert and no distress  HEENT: MMM, PERRLA.   NECK: supple  CHEST: lungs clear to auscultation without rales, rhonchi or wheezes, no use of accessory muscles, no retractions  CARDIOVASCULAR: tachy, irregular.  ABDOMEN: soft, non tender, bowel sounds normal, and non-distended.   EXTREMITIES: warm, 2+ BLE edema, DP/PT pulses 2+ bilaterally, no clubbing or cyanosis   NEURO: alert and oriented to person/place/time, normal speech, gait and affect  SKIN: no ecchymoses, no rashes      Laboratory:  CMP  Recent Labs   Lab 04/09/23  1332   *   POTASSIUM 4.8   CHLORIDE 97*   CO2 21*   ANIONGAP 12   *   BUN 13.3   CR 0.69   GFRESTIMATED 81   DEANA 9.3   MAG 2.0   PROTTOTAL 6.5   ALBUMIN 3.8   BILITOTAL 0.5   ALKPHOS 135*   AST 29   ALT 17     CBC  Recent Labs   Lab 04/09/23  1332   WBC 5.5   RBC 4.24   HGB 12.8   HCT 38.7   MCV 91   MCH 30.2   MCHC 33.1   RDW 13.9          Lab Results   Component Value Date    TROPI 0.159 () 05/01/2018    TROPI 0.249 () 04/30/2018    TROPONIN 0.13 () 04/30/2018         EKG 4/9/23:     TTE 3/22/23:  Interpretation Summary  Left ventricular function is decreased. The ejection fraction is 45-50%  (mildly reduced).  Global right " ventricular function is mildly reduced.  Moderate mitral insufficiency is present.  Mild to moderate tricuspid insufficiency is present.  Estimated mean right atrial pressure is elevated ~15 mmHg  No pericardial effusion is present.    Medical Decision Making       60 MINUTES SPENT BY ME on the date of service doing chart review, history, exam, documentation & further activities per the note.  MANAGEMENT DISCUSSED with the following over the past 24 hours: emergency room providers, bedside staff, patient   NOTE(S)/MEDICAL RECORDS REVIEWED over the past 24 hours: emergency room providers, prior admission notes, vu records, nursing notes  Tests ORDERED & REVIEWED in the past 24 hours:  - See lab/imaging results included in the data section of the note  Tests personally interpreted in the past 24 hours:  - EKG tracing showing AF with RVR  Medical complexity over the past 24 hours:  - Prescription DRUG MANAGEMENT performed         Patient discussed with Dr. Gonzalez.     JOSELUIS Chery, CNP  [facility] Cardiology

## 2023-04-10 ENCOUNTER — APPOINTMENT (OUTPATIENT)
Dept: CARDIOLOGY | Facility: CLINIC | Age: 88
DRG: 291 | End: 2023-04-10
Attending: NURSE PRACTITIONER
Payer: MEDICARE

## 2023-04-10 ENCOUNTER — ALLIED HEALTH/NURSE VISIT (OUTPATIENT)
Dept: CARDIOLOGY | Facility: CLINIC | Age: 88
DRG: 291 | End: 2023-04-10
Payer: MEDICARE

## 2023-04-10 DIAGNOSIS — I48.0 PAROXYSMAL ATRIAL FIBRILLATION (H): ICD-10-CM

## 2023-04-10 PROBLEM — I48.91 ATRIAL FIBRILLATION (H): Status: ACTIVE | Noted: 2018-04-30

## 2023-04-10 LAB
ANION GAP SERPL CALCULATED.3IONS-SCNC: 14 MMOL/L (ref 7–15)
ATRIAL RATE - MUSE: 141 BPM
BUN SERPL-MCNC: 13.9 MG/DL (ref 8–23)
CALCIUM SERPL-MCNC: 8.8 MG/DL (ref 8.2–9.6)
CHLORIDE SERPL-SCNC: 97 MMOL/L (ref 98–107)
CREAT SERPL-MCNC: 0.69 MG/DL (ref 0.51–0.95)
DEPRECATED HCO3 PLAS-SCNC: 22 MMOL/L (ref 22–29)
DIASTOLIC BLOOD PRESSURE - MUSE: NORMAL MMHG
ERYTHROCYTE [DISTWIDTH] IN BLOOD BY AUTOMATED COUNT: 13.8 % (ref 10–15)
GFR SERPL CREATININE-BSD FRML MDRD: 81 ML/MIN/1.73M2
GLUCOSE SERPL-MCNC: 104 MG/DL (ref 70–99)
HCT VFR BLD AUTO: 37.4 % (ref 35–47)
HGB BLD-MCNC: 12.2 G/DL (ref 11.7–15.7)
INR PPP: 1.33 (ref 0.85–1.15)
INTERPRETATION ECG - MUSE: NORMAL
LVEF ECHO: NORMAL
MAGNESIUM SERPL-MCNC: 1.9 MG/DL (ref 1.7–2.3)
MCH RBC QN AUTO: 29.8 PG (ref 26.5–33)
MCHC RBC AUTO-ENTMCNC: 32.6 G/DL (ref 31.5–36.5)
MCV RBC AUTO: 91 FL (ref 78–100)
P AXIS - MUSE: NORMAL DEGREES
PLATELET # BLD AUTO: 192 10E3/UL (ref 150–450)
POTASSIUM SERPL-SCNC: 3.6 MMOL/L (ref 3.4–5.3)
PR INTERVAL - MUSE: NORMAL MS
QRS DURATION - MUSE: 66 MS
QT - MUSE: 340 MS
QTC - MUSE: 511 MS
R AXIS - MUSE: 65 DEGREES
RBC # BLD AUTO: 4.09 10E6/UL (ref 3.8–5.2)
SODIUM SERPL-SCNC: 133 MMOL/L (ref 136–145)
SYSTOLIC BLOOD PRESSURE - MUSE: NORMAL MMHG
T AXIS - MUSE: -60 DEGREES
VENTRICULAR RATE- MUSE: 136 BPM
WBC # BLD AUTO: 5.9 10E3/UL (ref 4–11)

## 2023-04-10 PROCEDURE — 93010 ELECTROCARDIOGRAM REPORT: CPT | Mod: 59 | Performed by: INTERNAL MEDICINE

## 2023-04-10 PROCEDURE — 83735 ASSAY OF MAGNESIUM: CPT | Performed by: NURSE PRACTITIONER

## 2023-04-10 PROCEDURE — 85610 PROTHROMBIN TIME: CPT | Performed by: NURSE PRACTITIONER

## 2023-04-10 PROCEDURE — 36415 COLL VENOUS BLD VENIPUNCTURE: CPT | Performed by: NURSE PRACTITIONER

## 2023-04-10 PROCEDURE — 80048 BASIC METABOLIC PNL TOTAL CA: CPT | Performed by: NURSE PRACTITIONER

## 2023-04-10 PROCEDURE — 85027 COMPLETE CBC AUTOMATED: CPT | Performed by: NURSE PRACTITIONER

## 2023-04-10 PROCEDURE — 99233 SBSQ HOSP IP/OBS HIGH 50: CPT | Mod: 24 | Performed by: NURSE PRACTITIONER

## 2023-04-10 PROCEDURE — 93325 DOPPLER ECHO COLOR FLOW MAPG: CPT | Mod: 26 | Performed by: INTERNAL MEDICINE

## 2023-04-10 PROCEDURE — 93321 DOPPLER ECHO F-UP/LMTD STD: CPT

## 2023-04-10 PROCEDURE — 250N000013 HC RX MED GY IP 250 OP 250 PS 637: Performed by: NURSE PRACTITIONER

## 2023-04-10 PROCEDURE — 93244 EXT ECG>48HR<7D REV&INTERPJ: CPT | Performed by: INTERNAL MEDICINE

## 2023-04-10 PROCEDURE — 214N000001 HC R&B CCU UMMC

## 2023-04-10 PROCEDURE — 250N000011 HC RX IP 250 OP 636: Performed by: NURSE PRACTITIONER

## 2023-04-10 PROCEDURE — 93325 DOPPLER ECHO COLOR FLOW MAPG: CPT

## 2023-04-10 PROCEDURE — 999N000127 HC STATISTIC PERIPHERAL IV START W US GUIDANCE

## 2023-04-10 PROCEDURE — 93005 ELECTROCARDIOGRAM TRACING: CPT

## 2023-04-10 PROCEDURE — 93321 DOPPLER ECHO F-UP/LMTD STD: CPT | Mod: 26 | Performed by: INTERNAL MEDICINE

## 2023-04-10 PROCEDURE — 93308 TTE F-UP OR LMTD: CPT | Mod: 26 | Performed by: INTERNAL MEDICINE

## 2023-04-10 PROCEDURE — 258N000003 HC RX IP 258 OP 636: Performed by: NURSE PRACTITIONER

## 2023-04-10 RX ORDER — METOPROLOL SUCCINATE 25 MG/1
25 TABLET, EXTENDED RELEASE ORAL DAILY
Status: DISCONTINUED | OUTPATIENT
Start: 2023-04-11 | End: 2023-04-11

## 2023-04-10 RX ORDER — LOSARTAN POTASSIUM 25 MG/1
25 TABLET ORAL DAILY
Status: DISCONTINUED | OUTPATIENT
Start: 2023-04-10 | End: 2023-04-12 | Stop reason: HOSPADM

## 2023-04-10 RX ORDER — POTASSIUM CHLORIDE 750 MG/1
20 TABLET, EXTENDED RELEASE ORAL
Status: DISCONTINUED | OUTPATIENT
Start: 2023-04-10 | End: 2023-04-12 | Stop reason: HOSPADM

## 2023-04-10 RX ORDER — POTASSIUM CHLORIDE 750 MG/1
40 TABLET, EXTENDED RELEASE ORAL
Status: DISCONTINUED | OUTPATIENT
Start: 2023-04-10 | End: 2023-04-12 | Stop reason: HOSPADM

## 2023-04-10 RX ORDER — FUROSEMIDE 10 MG/ML
40 INJECTION INTRAMUSCULAR; INTRAVENOUS DAILY
Status: DISCONTINUED | OUTPATIENT
Start: 2023-04-10 | End: 2023-04-11

## 2023-04-10 RX ORDER — MAGNESIUM SULFATE HEPTAHYDRATE 40 MG/ML
2 INJECTION, SOLUTION INTRAVENOUS
Status: COMPLETED | OUTPATIENT
Start: 2023-04-10 | End: 2023-04-11

## 2023-04-10 RX ADMIN — DILTIAZEM HYDROCHLORIDE 240 MG: 120 CAPSULE, COATED, EXTENDED RELEASE ORAL at 08:10

## 2023-04-10 RX ADMIN — AMIODARONE HYDROCHLORIDE 0.5 MG/MIN: 50 INJECTION, SOLUTION INTRAVENOUS at 10:29

## 2023-04-10 RX ADMIN — APIXABAN 2.5 MG: 2.5 TABLET, FILM COATED ORAL at 20:23

## 2023-04-10 RX ADMIN — APIXABAN 2.5 MG: 2.5 TABLET, FILM COATED ORAL at 08:10

## 2023-04-10 RX ADMIN — FUROSEMIDE 40 MG: 10 INJECTION, SOLUTION INTRAVENOUS at 11:44

## 2023-04-10 RX ADMIN — AMIODARONE HYDROCHLORIDE 200 MG: 200 TABLET ORAL at 08:10

## 2023-04-10 RX ADMIN — AMIODARONE HYDROCHLORIDE 200 MG: 200 TABLET ORAL at 20:23

## 2023-04-10 RX ADMIN — LOSARTAN POTASSIUM 25 MG: 25 TABLET, FILM COATED ORAL at 11:44

## 2023-04-10 ASSESSMENT — ACTIVITIES OF DAILY LIVING (ADL)
ADLS_ACUITY_SCORE: 26
ADLS_ACUITY_SCORE: 26
ADLS_ACUITY_SCORE: 30
ADLS_ACUITY_SCORE: 26
ADLS_ACUITY_SCORE: 30
ADLS_ACUITY_SCORE: 30
ADLS_ACUITY_SCORE: 26
ADLS_ACUITY_SCORE: 26
ADLS_ACUITY_SCORE: 30
ADLS_ACUITY_SCORE: 27
ADLS_ACUITY_SCORE: 30
ADLS_ACUITY_SCORE: 26

## 2023-04-10 NOTE — PROGRESS NOTES
"[id number]    NEURO: A&O x4; calls appropriately, able to make needs known   RESPIRATORY: Room air during day; prefers to be on 1L NC overnight while sleeping  CARDIAC: AFIB w/RVR; HR 90-120s; AMIOdarone gtt and PO  GI/: Voiding adequately; no BM this shift   SKIN: No overt deficits noted   PAIN: Denies   TESTS/PROCEDURES: Plan for cardioversion tomorrow if patient does not self-convert  MOBILITY: SBA with walker   DIET: Regular   LDAs: PIV x1 running Amiodarone 0.5mg/min (16.67ml/hr); L AC PIV infiltrated while running Amiodarone (infiltration kit/photo completed)    PLAN: Plan for cardioversion tomorrow if patient does not self-convert; Continue POC; notify the primary team with any concerns/updates.     I/O this shift:  In: 414.21 [P.O.:240; I.V.:174.21]  Out: 300 [Urine:300]     /77 (BP Location: Right arm)   Pulse 107   Temp 97.5  F (36.4  C) (Oral)   Resp 16   Ht 1.565 m (5' 1.61\")   Wt 46.1 kg (101 lb 11.2 oz)   SpO2 97%   BMI 18.83 kg/m          "

## 2023-04-10 NOTE — CARE PLAN
Reason for admission: a fib rvr  Admitted from: ED  Report received from: Adolfo, rn  2 RN skin assessment completed by: dirk gomez and charge rn       -Findings (add LDA if needed):  generalized bruising to extremities. No significant findings      Pt up to unit on amio gtt at 1. x1 incont urine on bed. Pt cleaned up. Walked to bathroom. History gain by pt and son at bedside.  Pt a/o x4. Up as SBA/ x1 with walker at home. +1 edema to BLE. On room air. Regular diet. Food brought in by son. Tolerating well. x1 piv infusing.

## 2023-04-10 NOTE — CONSULTS
"89 year old Female presenting with new HFrEF EF 10-20%. CORE consult requested. Barbi is currently admitted with Afib/HFreEF.     Barbi and daughter  was provided with a CHF book.  I reviewed the importance of daily weights, 2 gm sodium diet, 2L fluid restriction and compliance with medications upon hospital discharge.  CORE contact phone numbers provided and patient is encouraged to call with any questions or concerns, including any weight gain or loss of 2 or more pounds in 24 hours or 5 or more pounds in 1 week.      Appointment made in CORE clinic on 23 at 10:30 with Dr. Zuñiga as no CORE appt available and pt needs to establish with Cardiololgy. .  I will follow-up with the patient at that time, sooner if needed.  Thank you for the consult.    Justine Robbins RN    Cardiology Care Coordinator - C[facility]R.E[facility]  Questions and [phone]  First press #1 for the [employer] and then press #3 for \"Medical Advise\" to reach us Cardiology Nurses.    "

## 2023-04-10 NOTE — CONSULTS
Patient has Medicare D through [employer] with Medical Assistance.    This plan does not contract with [facility].    Jardiance, Farxiga, and Entresto are all on formulary and do not require prior auth. The copay would be no more than $10.35/mo each.    Sharona Orosco  Pharmacy Technician/Liaison, Discharge Pharmacy   [phone] (voice or text)  [email or web address]

## 2023-04-10 NOTE — PLAN OF CARE
Pt a/o x4. Denies pain. Up x1 with walker. Remains a fib rvr. On ami gtt. 1L Nc at night (baseline for pt). +1 edema BLE. purewick in place, also getting up to bathroom.     PRN: none    Plan: continue to monitor HR/convert.

## 2023-04-10 NOTE — PROVIDER NOTIFICATION
Cards 1 updated regarding PIV replacement and infiltrated amiodarone. Drip restarted after new IV placed. Ice pack applied to affected arm. Will continue to monitor.

## 2023-04-10 NOTE — PROGRESS NOTES
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 Now admitted with recurrent AF with RVR and hypervolemia.      # Paroxsymal Atrial Fibrillation  # Troponin Elevation 2/2 tachycardia  Prior hx of pAF s/p DCCV 3/27/23. Pt admitted with palpitations, SOB, and BLE edema. EKG with Afib RVR, 's. Troponin mildly elevated 24, likely 2/2 afib rvr or CHF exacerbation.     - Rate Control:  discontinued Metoprolol, pt states had bad reaction to this medication previously (dizziness, excessive sleepiness)  - Rhythm Control: Continue Amiodarone gtt through this evening, also starting PO Amio 200 mg BID x 21 days, then 200 mg daily therafter (softer load per EP)  - If patient does not chemically convert, plan for cardioversion tomorrow  - NPO 0000    # Acute on Chronic Systolic Heart failure   # Moderate to Severe MR  # HTN  # Hyponatremia  SOB on admission with BLE edema, pt given IV Lasix with good UOP response. Prior echo with EF 45-50%, Na low on admission, improving with diuresis  - Repeat echo 4/10 with EF 10-20%, mod-severe MR  - Pending conversion to NSR, plan for repeat echo to reassess EF and MR  - Volume status: mildly hypervolemic, give 40 mg IV lasix today, possible transition to PO tomorrow (was on prn Lasix, likely needs daily dosing)  - BB: Did not start due to adverse reaction to Lopressor prior (see above)  - ACEi/ARB: Start Losartan 25 mg daily  - AA: Pending BP response to Losartan, may start Aldactone prior to discharge  - SGLT2i: pharmacy liaison consult to assess coverage  - CORE consult to establish care in CORE clinic       Chronic medical conditions  - Hx  of TIA  - Elevated LFT- lab stable  - Stage IV Carcinoma of right lung; continue PTA Osmiertinib     FEN: Regular, NPO 0000 in prep for cardioversion tomorrow  Code status: Full  Prophylaxis:  PO Eliquis  Isolation: none  Disposition: possible discharge 1-2 days pending volume status on PO medications, DCCV    Patient seen and discussed with Dr. Coy, who agrees with above plan.    Merly Wilkinson APRN, CNP  [facility] Cardiology Team  Pager [phone]/ASCOM [phone]    Interval History:  - No acute events overnight  - Pt s/p total 60 mg IV Lasix, net negative 2.3L  - Pt remains in atrial fibrillation, -120's, she is currently asymptomatic, denies ever having any chest pain, and denies palpitations   - Pt reports feeling much better this am; she continues to feel weak on her feet, but denies any SOB. She also denies any orthopnea, PND    Physical Exam:  Temp:  [97.6  F (36.4  C)-98.1  F (36.7  C)] 97.6  F (36.4  C)  Pulse:  [113-135] 118  Resp:  [16-20] 16  BP: ()/(74-98) 125/91  SpO2:  [91 %-96 %] 96 %    I/O:    Intake/Output Summary (Last 24 hours) at 4/10/2023 0921  Last data filed at 4/10/2023 0800  Gross per 24 hour   Intake 405.83 ml   Output 1800 ml   Net -1394.17 ml       Wt:   Wt Readings from Last 5 Encounters:   04/09/23 46.1 kg (101 lb 11.2 oz)   03/28/23 47.4 kg (104 lb 9.6 oz)   12/20/21 49.5 kg (109 lb 3.2 oz)   10/18/21 51.3 kg (113 lb 3.2 oz)   10/04/21 53.1 kg (117 lb)       General: NAD  HEENT:  PERRLA, EOMI.   Neck: JVD mildly elevated   CV: irregularly irregular rhythm, tachycardic rate. No murmur appreciated. No rubs or gallops. Peripheral radial pulse intact.  Resp: No increased work of breathing or use of accessory muscles, breathing comfortably on room air.  Lung sounds clear throughout/bilaterally  Abdomen:  Normal active bowel sounds.  Abdomen is soft. No distension, non-tender to palpation.    Extremities: Warm. Capillary refill less than 3 sec. 2/4 radial pulses bilaterally.  2/4 pedal  pulses bilaterally. No pre-tibial edema. No cyanosis or clubbing.  Skin:  Warm and dry. No erythema, rashes, ulceration or diaphoresis.  Neuro: Alert and oriented x3.      Medications:    amiodarone  200 mg Oral or FT or NG tube BID    Followed by     [START ON 5/1/2023] amiodarone  200 mg Oral or FT or NG tube Daily     apixaban ANTICOAGULANT  2.5 mg Oral BID     diltiazem ER COATED BEADS  240 mg Oral Daily     metoprolol tartrate  12.5 mg Oral BID     osimertinib  80 mg Oral Daily     sodium chloride (PF)  3 mL Intravenous Q8H     sodium chloride (PF)  3 mL Intracatheter Q8H       amiodarone       - MEDICATION INSTRUCTIONS -       - MEDICATION INSTRUCTIONS -       - MEDICATION INSTRUCTIONS -       - MEDICATION INSTRUCTIONS -         Labs:   CMP  Recent Labs   Lab 04/10/23  0621 04/09/23  1332   * 130*   POTASSIUM 3.6 4.8   CHLORIDE 97* 97*   CO2 22 21*   ANIONGAP 14 12   * 103*   BUN 13.9 13.3   CR 0.69 0.69   GFRESTIMATED 81 81   DEANA 8.8 9.3   MAG 1.9 2.0   PROTTOTAL  --  6.5   ALBUMIN  --  3.8   BILITOTAL  --  0.5   ALKPHOS  --  135*   AST  --  29   ALT  --  17     CBC  Recent Labs   Lab 04/10/23  0621 04/09/23  1332   WBC 5.9 5.5   RBC 4.09 4.24   HGB 12.2 12.8   HCT 37.4 38.7   MCV 91 91   MCH 29.8 30.2   MCHC 32.6 33.1   RDW 13.8 13.9    228     INRNo lab results found in last 7 days.  Arterial Blood GasNo lab results found in last 7 days.    Diagnostics:  EKG 4/9/23: AFib with RVR,       TTE 4/10/2023:  Interpretation Summary  The patient's rhythm is atrial fibrillation with RVR.  Left ventricular function is decreased. The ejection fraction is 10-20%  (severely reduced).  Severe diffuse hypokinesis is present with large area of apical wall akinesis.  Global right ventricular function is moderately reduced.  Moderate to severe mitral insufficiency is present.  IVC diameter >2.1 cm collapsing <50% with sniff suggests a high RA pressure  estimated at 15 mmHg or greater.  No  pericardial effusion is present.  A pleural effusion is present.  Compared to prior study, LV fxn and MR are worse.    Recent Results (from the past 24 hour(s))   XR Chest Port 1 View    Narrative    XR CHEST PORT 1 VIEW  4/9/2023 1:32 PM      HISTORY: atrial fibrillation    COMPARISON: Chest x-ray 3/27/2023, 3/23/2023.     FINDINGS:   Portable AP semiupright chest x-ray.    Trachea is midline. Cardiac silhouette is enlarged. Pulmonary  vasculature is indistinct. Increased bilateral mixed interstitial and  airspace opacities. Moderate left and small right pleural effusions,  increased. Opacity over the right upper lobe felt to be the patient's  known mass. No pneumothorax. Aortic arch calcifications. Prominent  skin fold on the left.    Bony structures appear intact.      Impression    IMPRESSION:   1.  Cardiomegaly with increased perihilar opacities/pulmonary edema.  2.  Increased moderate left and small right pleural effusions.  3.  Changed opacity over the right upper lobe felt to be the patient's  known mass.    I have personally reviewed the examination and initial interpretation  and I agree with the findings.    JEROD DAMON MD         SYSTEM ID:  [id number]       Medical Decision Making   50 MINUTES SPENT BY ME on the date of service doing chart review, history, exam, documentation & further activities per the note.   Daughter, Charity, present at bedside

## 2023-04-11 ENCOUNTER — ANESTHESIA (OUTPATIENT)
Dept: SURGERY | Facility: CLINIC | Age: 88
DRG: 291 | End: 2023-04-11
Payer: MEDICARE

## 2023-04-11 ENCOUNTER — APPOINTMENT (OUTPATIENT)
Dept: CARDIOLOGY | Facility: CLINIC | Age: 88
DRG: 291 | End: 2023-04-11
Attending: NURSE PRACTITIONER
Payer: MEDICARE

## 2023-04-11 ENCOUNTER — ANESTHESIA EVENT (OUTPATIENT)
Dept: SURGERY | Facility: CLINIC | Age: 88
DRG: 291 | End: 2023-04-11
Payer: MEDICARE

## 2023-04-11 LAB
ANION GAP SERPL CALCULATED.3IONS-SCNC: 12 MMOL/L (ref 7–15)
ATRIAL RATE - MUSE: NORMAL BPM
BUN SERPL-MCNC: 16 MG/DL (ref 8–23)
CALCIUM SERPL-MCNC: 8.8 MG/DL (ref 8.2–9.6)
CHLORIDE SERPL-SCNC: 98 MMOL/L (ref 98–107)
CREAT SERPL-MCNC: 0.71 MG/DL (ref 0.51–0.95)
DEPRECATED HCO3 PLAS-SCNC: 25 MMOL/L (ref 22–29)
DIASTOLIC BLOOD PRESSURE - MUSE: NORMAL MMHG
ERYTHROCYTE [DISTWIDTH] IN BLOOD BY AUTOMATED COUNT: 13.7 % (ref 10–15)
GFR SERPL CREATININE-BSD FRML MDRD: 79 ML/MIN/1.73M2
GLUCOSE SERPL-MCNC: 103 MG/DL (ref 70–99)
HCT VFR BLD AUTO: 36.8 % (ref 35–47)
HGB BLD-MCNC: 12.3 G/DL (ref 11.7–15.7)
INTERPRETATION ECG - MUSE: NORMAL
MAGNESIUM SERPL-MCNC: 1.8 MG/DL (ref 1.7–2.3)
MCH RBC QN AUTO: 30.2 PG (ref 26.5–33)
MCHC RBC AUTO-ENTMCNC: 33.4 G/DL (ref 31.5–36.5)
MCV RBC AUTO: 90 FL (ref 78–100)
P AXIS - MUSE: NORMAL DEGREES
PLATELET # BLD AUTO: 193 10E3/UL (ref 150–450)
POTASSIUM SERPL-SCNC: 3.7 MMOL/L (ref 3.4–5.3)
PR INTERVAL - MUSE: NORMAL MS
QRS DURATION - MUSE: 68 MS
QT - MUSE: 346 MS
QTC - MUSE: 495 MS
R AXIS - MUSE: -33 DEGREES
RBC # BLD AUTO: 4.07 10E6/UL (ref 3.8–5.2)
SODIUM SERPL-SCNC: 135 MMOL/L (ref 136–145)
SYSTOLIC BLOOD PRESSURE - MUSE: NORMAL MMHG
T AXIS - MUSE: -25 DEGREES
VENTRICULAR RATE- MUSE: 123 BPM
WBC # BLD AUTO: 5.3 10E3/UL (ref 4–11)

## 2023-04-11 PROCEDURE — 250N000013 HC RX MED GY IP 250 OP 250 PS 637: Performed by: NURSE PRACTITIONER

## 2023-04-11 PROCEDURE — 258N000003 HC RX IP 258 OP 636: Performed by: ANESTHESIOLOGY

## 2023-04-11 PROCEDURE — 93010 ELECTROCARDIOGRAM REPORT: CPT | Mod: 59 | Performed by: INTERNAL MEDICINE

## 2023-04-11 PROCEDURE — 250N000011 HC RX IP 250 OP 636: Performed by: NURSE PRACTITIONER

## 2023-04-11 PROCEDURE — 999N000128 HC STATISTIC PERIPHERAL IV START W/O US GUIDANCE

## 2023-04-11 PROCEDURE — 83735 ASSAY OF MAGNESIUM: CPT | Performed by: INTERNAL MEDICINE

## 2023-04-11 PROCEDURE — 93005 ELECTROCARDIOGRAM TRACING: CPT

## 2023-04-11 PROCEDURE — 250N000013 HC RX MED GY IP 250 OP 250 PS 637

## 2023-04-11 PROCEDURE — 92960 CARDIOVERSION ELECTRIC EXT: CPT | Performed by: NURSE PRACTITIONER

## 2023-04-11 PROCEDURE — 214N000001 HC R&B CCU UMMC

## 2023-04-11 PROCEDURE — 370N000017 HC ANESTHESIA TECHNICAL FEE, PER MIN

## 2023-04-11 PROCEDURE — 36415 COLL VENOUS BLD VENIPUNCTURE: CPT | Performed by: NURSE PRACTITIONER

## 2023-04-11 PROCEDURE — 80048 BASIC METABOLIC PNL TOTAL CA: CPT | Performed by: NURSE PRACTITIONER

## 2023-04-11 PROCEDURE — 99232 SBSQ HOSP IP/OBS MODERATE 35: CPT | Mod: 24

## 2023-04-11 PROCEDURE — 92960 CARDIOVERSION ELECTRIC EXT: CPT

## 2023-04-11 PROCEDURE — 250N000011 HC RX IP 250 OP 636: Performed by: ANESTHESIOLOGY

## 2023-04-11 PROCEDURE — 5A2204Z RESTORATION OF CARDIAC RHYTHM, SINGLE: ICD-10-PCS | Performed by: NURSE PRACTITIONER

## 2023-04-11 PROCEDURE — 85027 COMPLETE CBC AUTOMATED: CPT | Performed by: NURSE PRACTITIONER

## 2023-04-11 PROCEDURE — 250N000013 HC RX MED GY IP 250 OP 250 PS 637: Performed by: INTERNAL MEDICINE

## 2023-04-11 RX ORDER — PROPOFOL 10 MG/ML
INJECTION, EMULSION INTRAVENOUS PRN
Status: DISCONTINUED | OUTPATIENT
Start: 2023-04-11 | End: 2023-04-11

## 2023-04-11 RX ORDER — SODIUM CHLORIDE 9 MG/ML
INJECTION, SOLUTION INTRAVENOUS CONTINUOUS PRN
Status: DISCONTINUED | OUTPATIENT
Start: 2023-04-11 | End: 2023-04-11

## 2023-04-11 RX ORDER — FUROSEMIDE 40 MG
40 TABLET ORAL DAILY
Status: DISCONTINUED | OUTPATIENT
Start: 2023-04-12 | End: 2023-04-12 | Stop reason: HOSPADM

## 2023-04-11 RX ORDER — POTASSIUM CHLORIDE 750 MG/1
20 TABLET, EXTENDED RELEASE ORAL ONCE
Status: COMPLETED | OUTPATIENT
Start: 2023-04-11 | End: 2023-04-11

## 2023-04-11 RX ORDER — MAGNESIUM OXIDE 400 MG/1
400 TABLET ORAL EVERY 4 HOURS
Status: DISCONTINUED | OUTPATIENT
Start: 2023-04-11 | End: 2023-04-11

## 2023-04-11 RX ADMIN — FUROSEMIDE 40 MG: 10 INJECTION, SOLUTION INTRAVENOUS at 07:58

## 2023-04-11 RX ADMIN — SODIUM CHLORIDE: 9 INJECTION, SOLUTION INTRAVENOUS at 16:32

## 2023-04-11 RX ADMIN — APIXABAN 2.5 MG: 2.5 TABLET, FILM COATED ORAL at 07:58

## 2023-04-11 RX ADMIN — PROPOFOL 10 MG: 10 INJECTION, EMULSION INTRAVENOUS at 16:33

## 2023-04-11 RX ADMIN — APIXABAN 2.5 MG: 2.5 TABLET, FILM COATED ORAL at 20:39

## 2023-04-11 RX ADMIN — MAGNESIUM SULFATE IN WATER 2 G: 40 INJECTION, SOLUTION INTRAVENOUS at 11:57

## 2023-04-11 RX ADMIN — AMIODARONE HYDROCHLORIDE 200 MG: 200 TABLET ORAL at 20:39

## 2023-04-11 RX ADMIN — PHENYLEPHRINE HYDROCHLORIDE 100 MCG: 10 INJECTION INTRAVENOUS at 16:34

## 2023-04-11 RX ADMIN — EMPAGLIFLOZIN 10 MG: 10 TABLET, FILM COATED ORAL at 10:30

## 2023-04-11 RX ADMIN — AMIODARONE HYDROCHLORIDE 200 MG: 200 TABLET ORAL at 07:58

## 2023-04-11 RX ADMIN — PROPOFOL 10 MG: 10 INJECTION, EMULSION INTRAVENOUS at 16:35

## 2023-04-11 RX ADMIN — LOSARTAN POTASSIUM 25 MG: 25 TABLET, FILM COATED ORAL at 07:58

## 2023-04-11 RX ADMIN — PROPOFOL 10 MG: 10 INJECTION, EMULSION INTRAVENOUS at 16:34

## 2023-04-11 RX ADMIN — POTASSIUM CHLORIDE 20 MEQ: 750 TABLET, EXTENDED RELEASE ORAL at 10:30

## 2023-04-11 ASSESSMENT — ACTIVITIES OF DAILY LIVING (ADL)
ADLS_ACUITY_SCORE: 26
ADLS_ACUITY_SCORE: 27
ADLS_ACUITY_SCORE: 26
ADLS_ACUITY_SCORE: 27
ADLS_ACUITY_SCORE: 26
ADLS_ACUITY_SCORE: 26
ADLS_ACUITY_SCORE: 27
ADLS_ACUITY_SCORE: 27
ADLS_ACUITY_SCORE: 26
ADLS_ACUITY_SCORE: 26

## 2023-04-11 NOTE — SEDATION DOCUMENTATION
Pt arrived in ECHO department for scheduled cardioversion.   Procedure explained, questions answered and consent signed with patient and her son and daughter present.  Patient is appropriately anticoagulated with Eliquis.  No missed doses in the past 4 weeks.  Labs WNL.  Anesthesia gave pt 30 mg IV propofol for sedation and pt was DCCV at 120 Joules to a SR.  Pt denied pain after procedure and was transported back to  after awake and VSS.   Report given to Joanne WYNN RN.

## 2023-04-11 NOTE — PROGRESS NOTES
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 Now admitted with recurrent AF with RVR and hypervolemia.    Interval History: No acute events overnight. Patient hemodynamically stable, on room air. Utilizing supplement O2 overnight; awaiting outpatient sleep study. Denies any chest pain or palpitations but endorses shortness of breath. Amiodarone started yesterday; HRs in the 100-120's. Plan for DCCV today; does not need IDALMIS as she had one last week while on Eliquis.    Changes Today:  - Continue diuresis  - DCCV today  - Start Jardiance 10mg    # Paroxsymal Atrial Fibrillation  # Troponin Elevation 2/2 tachycardia  Prior hx of pAF s/p DCCV 3/27/23. Pt admitted with palpitations, SOB, and BLE edema. EKG with Afib RVR, 's. Troponin mildly elevated 24, likely 2/2 afib rvr or CHF exacerbation.   - Rate Control: Discontinued Metoprolol; patient states had bad reaction to this medication previously (dizziness, excessive sleepiness)  - Rhythm Control: Amiodarone loading; transitioned to PO today   - DCCV Today      # Acute on Chronic Systolic Heart failure (10-15%)  # Moderate to Severe MR  # Hypertension  # Hyponatremia: Improving  SOB on admission with BLE edema, pt given IV Lasix with good UOP response. Prior echo with EF 45-50%, now EF 10-20% (while in AF w/ RVR) and mod-severe MR. Sodium low on admission, improving with diuresis.  - Pending conversion to NSR, plan for repeat echo to reassess EF and MR  - Volume status: Near euvolemic; IV Lasix 40mg today, PO 40mg tomorrow   - BB: Did not start due to adverse reaction to Lopressor prior (see above)  - ACEi/ARB:  Continue Losartan 25 mg daily (started 4/10)  - AA: Defer for now while optimizing other GDMT; consider starting after DCCV   - SGLT2i: Start Jardiance 10mg  - [facility] consulted; has enrollment scheduled for 4/18.      Chronic medical conditions:  - Hx of TIA  - Elevated LFT- lab stable  - Stage IV Carcinoma of right lung; continue PTA Osmiertinib      FEN: NPO for DCC  Code status: Full  Prophylaxis:  PTA Eliquis  Isolation: None  Disposition: 1-2 days pending fluid status      Patient discussed with Dr. Coy, who agrees with above plan    Georgie HUGGINS, CNP  [facility] Cardiology Team    Physical Exam:  Temp:  [97.3  F (36.3  C)-98  F (36.7  C)] 97.4  F (36.3  C)  Pulse:  [102-126] 117  Resp:  [16] 16  BP: ()/(67-80) 111/80  SpO2:  [93 %-98 %] 96 %    I/O:   Intake/Output Summary (Last 24 hours) at 4/11/2023 0918  Last data filed at 4/11/2023 0646  Gross per 24 hour   Intake 676.44 ml   Output 750 ml   Net -73.56 ml       Wt:   Wt Readings from Last 5 Encounters:   04/11/23 46.5 kg (102 lb 9.6 oz)   03/28/23 47.4 kg (104 lb 9.6 oz)   12/20/21 49.5 kg (109 lb 3.2 oz)   10/18/21 51.3 kg (113 lb 3.2 oz)   10/04/21 53.1 kg (117 lb)       General: NAD  HEENT:  PERRLA, EOMI.   Neck: JVD mildly elevated   CV: irregularly irregular rhythm, tachycardic rate. No murmur appreciated. No rubs or gallops. Peripheral radial pulse intact.  Resp: No increased work of breathing or use of accessory muscles, breathing comfortably on room air.  Lung sounds clear throughout/bilaterally  Abdomen:  Normal active bowel sounds.  Abdomen is soft. No distension, non-tender to palpation.    Extremities: Warm. Capillary refill less than 3 sec. 2/4 radial pulses bilaterally.  2/4 pedal pulses bilaterally. No pre-tibial edema. No cyanosis or clubbing.  Skin:  Warm and dry. No erythema, rashes, ulceration or diaphoresis.  Neuro: Alert and oriented x3.    Medications:    amiodarone  200 mg Oral or FT or NG tube BID    Followed by      [START ON 5/1/2023] amiodarone  200 mg Oral or FT or NG tube Daily     apixaban ANTICOAGULANT  2.5 mg Oral BID     furosemide  40 mg Intravenous Daily     losartan  25 mg Oral Daily     [Held by provider] metoprolol succinate ER  25 mg Oral Daily     osimertinib  80 mg Oral Daily     potassium chloride  20 mEq Oral Once     sodium chloride (PF)  3 mL Intravenous Q8H     sodium chloride (PF)  3 mL Intracatheter Q8H       - MEDICATION INSTRUCTIONS -       - MEDICATION INSTRUCTIONS -       - MEDICATION INSTRUCTIONS -       - MEDICATION INSTRUCTIONS -         Labs:   CMP  Recent Labs   Lab 04/11/23  0637 04/10/23  0621 04/09/23  1332   * 133* 130*   POTASSIUM 3.7 3.6 4.8   CHLORIDE 98 97* 97*   CO2 25 22 21*   ANIONGAP 12 14 12   * 104* 103*   BUN 16.0 13.9 13.3   CR 0.71 0.69 0.69   GFRESTIMATED 79 81 81   DEANA 8.8 8.8 9.3   MAG  --  1.9 2.0   PROTTOTAL  --   --  6.5   ALBUMIN  --   --  3.8   BILITOTAL  --   --  0.5   ALKPHOS  --   --  135*   AST  --   --  29   ALT  --   --  17     CBC  Recent Labs   Lab 04/11/23  0637 04/10/23  0621 04/09/23  1332   WBC 5.3 5.9 5.5   RBC 4.07 4.09 4.24   HGB 12.3 12.2 12.8   HCT 36.8 37.4 38.7   MCV 90 91 91   MCH 30.2 29.8 30.2   MCHC 33.4 32.6 33.1   RDW 13.7 13.8 13.9    192 228     INR  Recent Labs   Lab 04/10/23  1044   INR 1.33*     Arterial Blood GasNo lab results found in last 7 days.    Diagnostics:    EKG 4/9/23: AFib with RVR,        TTE 4/10/2023:  Interpretation Summary  The patient's rhythm is atrial fibrillation with RVR.  Left ventricular function is decreased. The ejection fraction is 10-20%  (severely reduced).  Severe diffuse hypokinesis is present with large area of apical wall akinesis.  Global right ventricular function is moderately reduced.  Moderate to severe mitral insufficiency is present.  IVC diameter >2.1 cm collapsing <50% with sniff suggests a high RA pressure  estimated at 15 mmHg or greater.  No pericardial effusion is  present.  A pleural effusion is present.  Compared to prior study, LV fxn and MR are worse.    No results found for this or any previous visit (from the past 24 hour(s)).    Medical Decision Making     35 MINUTES SPENT BY ME on the date of service doing chart review, history, exam, documentation & further activities per the note.      JOSELUIS Brooks CNP on 4/11/2023 at 9:50 AM

## 2023-04-11 NOTE — ANESTHESIA CARE TRANSFER NOTE
Patient: Barbi Rebolledo    Procedure: Procedure(s):  Anesthesia cardioversion       Diagnosis: Atrial fibrillation, unspecified type (H) [I48.91]  Diagnosis Additional Information: No value filed.    Anesthesia Type:   MAC     Note:    Oropharynx: oropharynx clear of all foreign objects and spontaneously breathing  Level of Consciousness: awake  Oxygen Supplementation: nasal cannula  Level of Supplemental Oxygen (L/min / FiO2): 4  Independent Airway: airway patency satisfactory and stable  Dentition: dentition unchanged  Vital Signs Stable: post-procedure vital signs reviewed and stable  Report to RN Given: handoff report given  Patient transferred to: Phase II (Echo)    Handoff Report: Identifed the Patient, Identified the Reponsible Provider, Reviewed the pertinent medical history, Discussed the surgical course, Reviewed Intra-OP anesthesia mangement and issues during anesthesia, Set expectations for post-procedure period and Allowed opportunity for questions and acknowledgement of understanding      Vitals:  Vitals Value Taken Time   BP     Temp     Pulse     Resp     SpO2         Electronically Signed By: JOSELUIS Farris CRNA  April 11, 2023  4:44 PM

## 2023-04-11 NOTE — ANESTHESIA PREPROCEDURE EVALUATION
Anesthesia Pre-Procedure Evaluation    Patient: Barbi Rebolledo   MRN: [id number] : 1930        Procedure : Procedure(s):  Anesthesia cardioversion          Past Medical History:   Diagnosis Date     Squamous cell carcinoma       Past Surgical History:   Procedure Laterality Date     ANESTHESIA CARDIOVERSION N/A 3/27/2023    Procedure: Anesthesia cardioversion;  Surgeon: GENERIC ANESTHESIA PROVIDER;  Location: [facility] OR     MOHS MICROGRAPHIC PROCEDURE        Allergies   Allergen Reactions     Brevital Sodium [Methohexital] Fatigue     Was aware of surroundings but unable to move, gave her significant anxiety after given for DCCV 3 hours after administered 30g     Fentanyl Other (See Comments)     Sedation     Lisinopril Swelling     Swelling of tongue     Metoprolol Dizziness and Cough     Novocain [Procaine] Other (See Comments)     Tachycardia      Social History     Tobacco Use     Smoking status: Never     Smokeless tobacco: Never   Vaping Use     Vaping status: Not on file   Substance Use Topics     Alcohol use: Not on file      Wt Readings from Last 1 Encounters:   23 46.5 kg (102 lb 9.6 oz)        Anesthesia Evaluation   Pt has had prior anesthetic.         ROS/MED HX  ENT/Pulmonary:  - neg pulmonary ROS     Neurologic:  - neg neurologic ROS   (+) TIA (questionable),     Cardiovascular:     (+) hypertension-----CHF DYE. Irregular Heartbeat/Palpitations, valvular problems/murmurs type: MR     METS/Exercise Tolerance:     Hematologic:  - neg hematologic  ROS     Musculoskeletal:  - neg musculoskeletal ROS     GI/Hepatic: Comment: Liver involvement of stage 4 lung CA    (+) liver disease,     Renal/Genitourinary:  - neg Renal ROS     Endo:  - neg endo ROS     Psychiatric/Substance Use:  - neg psychiatric ROS     Infectious Disease:  - neg infectious disease ROS     Malignancy:   (+) Malignancy, History of Lung.  Lung CA Active status post.        Other:            Physical Exam    Airway         Mallampati: II   TM distance: > 3 FB   Neck ROM: full   Mouth opening: > 3 cm    Respiratory Devices and Support     Nasal Canula      Dental       (+) Multiple crowns, permanant bridges      Cardiovascular          Rhythm and rate: irregular and tachycardia     Pulmonary                   OUTSIDE LABS:  CBC:   Lab Results   Component Value Date    WBC 5.3 04/11/2023    WBC 5.9 04/10/2023    HGB 12.3 04/11/2023    HGB 12.2 04/10/2023    HCT 36.8 04/11/2023    HCT 37.4 04/10/2023     04/11/2023     04/10/2023     BMP:   Lab Results   Component Value Date     (L) 04/11/2023     (L) 04/10/2023    POTASSIUM 3.7 04/11/2023    POTASSIUM 3.6 04/10/2023    CHLORIDE 98 04/11/2023    CHLORIDE 97 (L) 04/10/2023    CO2 25 04/11/2023    CO2 22 04/10/2023    BUN 16.0 04/11/2023    BUN 13.9 04/10/2023    CR 0.71 04/11/2023    CR 0.69 04/10/2023     (H) 04/11/2023     (H) 04/10/2023     COAGS:   Lab Results   Component Value Date    PTT 31 06/15/2014    INR 1.33 (H) 04/10/2023     POC: No results found for: BGM, HCG, HCGS  HEPATIC:   Lab Results   Component Value Date    ALBUMIN 3.8 04/09/2023    PROTTOTAL 6.5 04/09/2023    ALT 17 04/09/2023    AST 29 04/09/2023    ALKPHOS 135 (H) 04/09/2023    BILITOTAL 0.5 04/09/2023     OTHER:   Lab Results   Component Value Date    LACT 0.9 04/09/2023    DEANA 8.8 04/11/2023    MAG 1.8 04/11/2023    LIPASE 21 04/09/2023    TSH 1.46 03/21/2023    T4 9.9 07/21/2021    .4 (H) 06/15/2014    SED 44 (H) 06/15/2014       Anesthesia Plan    ASA Status:  3   NPO Status:  NPO Appropriate    Anesthesia Type: MAC.   Induction: Intravenous.           Consents    Anesthesia Plan(s) and associated risks, benefits, and realistic alternatives discussed. Questions answered and patient/representative(s) expressed understanding.     - Discussed: Risks, Benefits and Alternatives for BOTH SEDATION and the PROCEDURE were discussed     - Discussed with:  Patient      -  Extended Intubation/Ventilatory Support Discussed: No.    Use of blood products discussed: No .     Postoperative Care    Post procedure pain management: not necessary.   PONV prophylaxis:: not indicated.     Comments:                Winston Lemos MD

## 2023-04-11 NOTE — DISCHARGE SUMMARY
[address] [address]  p: [phone]    Discharge Summary: Cardiology Service    Barbi Rebolledo MRN# [id number]   YOB: 1930 Age: 89 year old       Admission Date: 4/9/2023  Discharge Date: 04/12/23    Discharge Diagnoses:  1. Paroxsymal Atrial Fibrillation  2. Troponin Elevation 2/2 Tachycardia  3. Acute on Chronic Systolic Heart Failure  4. Moderate to Severe MR  5. Hypertension  6. 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 Now admitted with recurrent AF with RVR and hypervolemia. She was diuresed with IV Lasix and started on amiodarone gtt before being transitioned to PO amiodarone. Additionally, she underwent successful DCCV on 4/11/23. She is discharged home in stable condition with outpatient cardiology follow up.     Hospital Course by Diagnosis:  # Paroxsymal Atrial Fibrillation  # Troponin Elevation 2/2 tachycardia  Prior hx of pAF s/p DCCV 3/27/23. Pt admitted with palpitations, SOB, and BLE edema. EKG with Afib RVR, 's. Troponin mildly elevated 24, likely 2/2 afib rvr or CHF exacerbation.   - Rate Control: Discontinued Metoprolol; patient states had bad reaction to this medication previously exhausted, listless, dizzy, fatigue  - Rhythm Control: Amiodarone 200mg BID x 7 days, then 200mg daily     # Acute on Chronic Systolic Heart failure (10-15%)  # Moderate to Severe MR  # Hypertension  # Hyponatremia: Improving  SOB on admission with BLE edema, pt given IV Lasix with good UOP response. Prior echo with EF 45-50%, now EF 10-20% (while in AF w/ RVR) and mod-severe MR. Sodium low on admission, improving with diuresis.  Repeat limited echocardiogram performed after DCCV once rates improved which revealed minimally improved EF 20-25%.    - Volume Status: Euvolemic; PO Lasix 40mg daily    - BB: Did not start due to adverse reaction to Lopressor prior (see above)  - ACEi/ARB: Continue Losartan 25 mg daily (started 4/10)  - AA: Start Spironolactone 12.5mg daily   - SGLT2i: Continue Jardiance 10mg  - Ischemic evaluation: Patient declines invasive interventions at this time   - [facility] consulted; has enrollment scheduled for 4/18.     # Stage IV Carcinoma of right lung  - HOLD PTA Osmiertinib due to prolonged QT  - Follow-up with oncology regarding resuming medication      Chronic Medical Conditions:  - Hx of TIA  - Elevated LFT- lab stable    Pertinent Procedures:  1. DCCV on 4/11/23    Consults:  - None    Medication Changes:  - START Jardiance 10mg  - START Losartan 25mg daily  - START Amiodarone 200mg BID x 7 days then 200mg daily  - START Lasix 40mg daily   - START Spironolactone 12.5mg daily   - HOLD Osmiertinib     Discharge medications:   Current Discharge Medication List      START taking these medications    Details   !! amiodarone (PACERONE) 200 MG tablet Take TWICE daily through 4/16/23. Then transition to ONCE daily.  Qty: 9 tablet, Refills: 0    Associated Diagnoses: Paroxysmal atrial fibrillation (H)      !! amiodarone (PACERONE) 200 MG tablet 1 tablet (200 mg) by Oral or FT or NG tube route daily Start on 4/17/23.  Qty: 90 tablet, Refills: 3    Associated Diagnoses: Paroxysmal atrial fibrillation (H)      empagliflozin (JARDIANCE) 10 MG TABS tablet Take 1 tablet (10 mg) by mouth daily  Qty: 90 tablet, Refills: 1    Associated Diagnoses: Acute on chronic systolic heart failure (H)      losartan (COZAAR) 25 MG tablet Take 1 tablet (25 mg) by mouth daily  Qty: 90 tablet, Refills: 3    Associated Diagnoses: Acute on chronic systolic heart failure (H)      spironolactone (ALDACTONE) 25 MG tablet Take 0.5 tablets (12.5 mg) by  mouth daily  Qty: 45 tablet, Refills: 3    Associated Diagnoses: Acute on chronic systolic heart failure (H)       !! - Potential duplicate medications found. Please discuss with provider.      CONTINUE these medications which have CHANGED    Details   furosemide (LASIX) 40 MG tablet Take 1 tablet (40 mg) by mouth daily  Qty: 90 tablet, Refills: 3    Associated Diagnoses: Acute on chronic systolic heart failure (H)         CONTINUE these medications which have NOT CHANGED    Details   apixaban ANTICOAGULANT (ELIQUIS) 2.5 MG tablet Take 1 tablet (2.5 mg) by mouth 2 times daily  Qty: 180 tablet, Refills: 3    Associated Diagnoses: Paroxysmal atrial fibrillation (H)      emollient (VANICREAM) cream Apply topically daily to entire body, especially after bathing.  Qty: 453 g, Refills: 11    Associated Diagnoses: Dermatitis; Xerosis of skin         STOP taking these medications       diltiazem ER COATED BEADS (CARDIZEM CD/CARTIA XT) 240 MG 24 hr capsule Comments:   Reason for Stopping:         metoprolol tartrate (LOPRESSOR) 25 MG tablet Comments:   Reason for Stopping:         osimertinib (TAGRISSO) 80 MG tablet Comments:   Reason for Stopping:               Follow-up:  - [facility] Follow-up on 4/18/23    Labs or imaging requiring follow-up after discharge:  - Lab work at [facility] follow up     Code status:  Full     Condition on discharge  Temp:  [97.3  F (36.3  C)-97.6  F (36.4  C)] 97.4  F (36.3  C)  Pulse:  [102-133] 133  Resp:  [16-20] 20  BP: ()/() 125/107  SpO2:  [94 %-98 %] 94 %     General: Alert, interactive, NAD  Eyes: sclera anicteric, EOMI  Neck: JVP just above clavicle, carotid 2+ bilaterally  Cardiovascular: regular rate and rhythm, normal S1 and S2, no murmurs, gallops, or rubs  Resp: clear to auscultation bilaterally, no rales, wheezes, or rhonchi  GI: Soft, nontender, nondistended. +BS.  No HSM or masses, no rebound or guarding.  Extremities: no edema, no cyanosis or clubbing, dorsalis pedis and  posterior tibialis pulses 2+ bilaterally  Skin: Warm and dry, no jaundice or rash  Neuro: CN 2-12 intact, moves all extremities equally  Psych: Alert & oriented x 3    Imaging with results:    EKG 4/9/23: AFib with RVR,          TTE 4/10/2023:  Interpretation Summary  The patient's rhythm is atrial fibrillation with RVR.  Left ventricular function is decreased. The ejection fraction is 10-20%  (severely reduced).  Severe diffuse hypokinesis is present with large area of apical wall akinesis.  Global right ventricular function is moderately reduced.  Moderate to severe mitral insufficiency is present.  IVC diameter >2.1 cm collapsing <50% with sniff suggests a high RA pressure  estimated at 15 mmHg or greater.  No pericardial effusion is present.  A pleural effusion is present.  Compared to prior study, LV fxn and MR are worse    Patient Care Team:  Cristina Balderas MD as PCP - General (Internal Medicine)  Virgen Armas PA-C as Physician Assistant (Physician Assistant - Medical)  Chandni Bains MD as Assigned Heart and Vascular Provider  Raheem Vidal MD as Fellow (Pulmonary Disease)  Juan Pablo Sharif MD as Referring Physician (Internal Medicine)  Laz Gasca MD as MD (Dermatology)  Brian Coulter MD as Assigned Surgical Provider    Time Spent on this Encounter   I, JOSELUIS Brooks CNP, personally saw the patient today and spent greater than 30 minutes discharging this patient.     Patient discussed with staff cardiologist, Dr. Coy, who agrees with the above documentation and plan. Documentation represents joint decision making.     JOSELUIS Brooks CNP on 4/12/2023 at 10:04 AM  [facility]

## 2023-04-11 NOTE — ANESTHESIA POSTPROCEDURE EVALUATION
Patient: Barbi Rebolledo    Procedure: Procedure(s):  Anesthesia cardioversion       Anesthesia Type:  MAC    Note:  Disposition: Inpatient   Postop Pain Control: Uneventful            Sign Out: Well controlled pain   PONV: No   Neuro/Psych: Uneventful            Sign Out: Acceptable/Baseline neuro status   Airway/Respiratory: Uneventful            Sign Out: Acceptable/Baseline resp. status   CV/Hemodynamics: Uneventful            Sign Out: Acceptable CV status; No obvious hypovolemia; No obvious fluid overload   Other NRE: NONE   DID A NON-ROUTINE EVENT OCCUR? No           Last vitals:  Vitals:    04/11/23 1647 04/11/23 1652 04/11/23 1700   BP: 118/72 110/75 119/79   Pulse: 96 85 83   Resp: 25 26 28   Temp:      SpO2: 98% 98% 97%       Electronically Signed By: Winston Lemos MD  April 11, 2023  5:15 PM

## 2023-04-11 NOTE — PROGRESS NOTES
"[id number]     NEURO: A&O x4; calls appropriately, able to make needs known   RESPIRATORY: Room air during day; prefers to be on 1L NC overnight/while sleeping  CARDIAC: AFIB w/RVR; HR 90-120s; after cardioversion converted to SR with HR 90s, occasional PVCs and PACs  GI/: Voiding adequately; no BM this shift   SKIN: IV extravasation kit/assessments for L PIV in AC that extravasated while infusing AMIOdarone   PAIN: Denies   TESTS/PROCEDURES: Cardioversion today, left at 1500, returned 1715;  MOBILITY: SBA with walker   DIET: Regular   LDAs: PIV x2; Mg replacement per protocol; K+ replacement per protocol      PLAN: Continue POC; notify the primary team with any concerns/updates.       Intake/Output Summary (Last 24 hours) at 4/11/2023 1844  Last data filed at 4/11/2023 1800  Gross per 24 hour   Intake 436.44 ml   Output 600 ml   Net -163.56 ml       /85 (BP Location: Right arm)   Pulse 90   Temp 97.4  F (36.3  C) (Oral)   Resp 20   Ht 1.565 m (5' 1.61\")   Wt 46.5 kg (102 lb 9.6 oz)   SpO2 92%   BMI 19.00 kg/m      "

## 2023-04-11 NOTE — PROCEDURES
[facility]    Procedure: EP Cardioversion External    Date/Time: 4/11/2023 4:31 PM    Performed by: Chely Guthrie APRN CNP  Authorized by: Arlene Wilkinson CNP      UNIVERSAL PROTOCOL   Site Marked: NA  Prior Images Obtained and Reviewed:  NA  Required items: Required blood products, implants, devices and special equipment available    Patient identity confirmed:  Verbally with patient  Patient was reevaluated immediately before administering moderate or deep sedation or anesthesia  Confirmation Checklist:  Patient's identity using two indicators  Time out: Immediately prior to the procedure a time out was called    Universal Protocol: the Joint Commission Universal Protocol was followed       ANESTHESIA  Anesthesia was administered and monitored by anesthesiology.  See anesthesia documentation for details.    PROCEDURE DETAILS  Pre-procedure rhythm: atrial fibrillation  Patient position: patient was placed in a supine position  Chest area: chest area exposed  Electrodes: pads  Electrodes placed: anterior-posterior  Number of attempts: 1    Details of Attempts:  120J biphasic synchronized shock delivered with successful conversion to sinus   Post-procedure rhythm: normal sinus rhythm  Complications: no complications      PROCEDURE    Patient Tolerance:  Patient tolerated the procedure well with no immediate complications  Length of time physician/provider present for 1:1 monitoring during sedation: 0      JOSELUIS Chery CNP  Electrophysiology Consult Service  Pager: [phone]

## 2023-04-11 NOTE — PLAN OF CARE
Temp: 97.6  F (36.4  C) Temp src: Oral BP: 108/80 Pulse: 117   Resp: 16 SpO2: 96 % O2 Device: Nasal cannula Oxygen Delivery: 1 LPM     Diagnosis: recurrent Afib with RVR, hypervolemia  History of paroxysmal Afib s/p DCCV (3/27), pericardial effusion s/p pericardiocentesis (2022), R lung adenocarcinoma, hemoptysis, HTN, TIA, splenic & renal infarcts    Neuro: A&Ox4, denied headache or dizziness.  Cardiac: Tele in place, Afib.   Respiratory: O2 sating >96% on 1L NC overnight for comfort.  Vitals: VSS, afebrile, denied pain  GI/: Denied nausea, no BM overnight but passing flatus, voided 200 mL overnight.  Skin: Right PIV in place, SL. Left extravasation site is WNL.   Mobility: Up SBA with walker to bathroom x2.   Rest: Slept well overnight.    Changed: amiodarone drip stopped @ 2019 per MAR    P: Continue to monitor and follow POC. Plan for possible cardioversion today. Notify CARDS 1 with changes.      [id number]

## 2023-04-12 ENCOUNTER — APPOINTMENT (OUTPATIENT)
Dept: CARDIOLOGY | Facility: CLINIC | Age: 88
DRG: 291 | End: 2023-04-12
Payer: MEDICARE

## 2023-04-12 VITALS
HEART RATE: 75 BPM | SYSTOLIC BLOOD PRESSURE: 129 MMHG | BODY MASS INDEX: 18.58 KG/M2 | TEMPERATURE: 97.5 F | RESPIRATION RATE: 16 BRPM | HEIGHT: 62 IN | OXYGEN SATURATION: 95 % | WEIGHT: 100.97 LBS | DIASTOLIC BLOOD PRESSURE: 79 MMHG

## 2023-04-12 LAB
ANION GAP SERPL CALCULATED.3IONS-SCNC: 13 MMOL/L (ref 7–15)
ATRIAL RATE - MUSE: 84 BPM
BUN SERPL-MCNC: 18.9 MG/DL (ref 8–23)
CALCIUM SERPL-MCNC: 8.9 MG/DL (ref 8.2–9.6)
CHLORIDE SERPL-SCNC: 97 MMOL/L (ref 98–107)
CREAT SERPL-MCNC: 0.77 MG/DL (ref 0.51–0.95)
DEPRECATED HCO3 PLAS-SCNC: 23 MMOL/L (ref 22–29)
DIASTOLIC BLOOD PRESSURE - MUSE: NORMAL MMHG
ERYTHROCYTE [DISTWIDTH] IN BLOOD BY AUTOMATED COUNT: 13.9 % (ref 10–15)
GFR SERPL CREATININE-BSD FRML MDRD: 72 ML/MIN/1.73M2
GLUCOSE SERPL-MCNC: 92 MG/DL (ref 70–99)
HCT VFR BLD AUTO: 37.9 % (ref 35–47)
HGB BLD-MCNC: 12.2 G/DL (ref 11.7–15.7)
INTERPRETATION ECG - MUSE: NORMAL
LVEF ECHO: NORMAL
MAGNESIUM SERPL-MCNC: 2.3 MG/DL (ref 1.7–2.3)
MCH RBC QN AUTO: 29.8 PG (ref 26.5–33)
MCHC RBC AUTO-ENTMCNC: 32.2 G/DL (ref 31.5–36.5)
MCV RBC AUTO: 93 FL (ref 78–100)
P AXIS - MUSE: 74 DEGREES
PLATELET # BLD AUTO: 187 10E3/UL (ref 150–450)
POTASSIUM SERPL-SCNC: 4 MMOL/L (ref 3.4–5.3)
PR INTERVAL - MUSE: 194 MS
QRS DURATION - MUSE: 70 MS
QT - MUSE: 442 MS
QTC - MUSE: 522 MS
R AXIS - MUSE: -40 DEGREES
RBC # BLD AUTO: 4.09 10E6/UL (ref 3.8–5.2)
SODIUM SERPL-SCNC: 133 MMOL/L (ref 136–145)
SYSTOLIC BLOOD PRESSURE - MUSE: NORMAL MMHG
T AXIS - MUSE: 1 DEGREES
VENTRICULAR RATE- MUSE: 84 BPM
WBC # BLD AUTO: 5.6 10E3/UL (ref 4–11)

## 2023-04-12 PROCEDURE — 250N000013 HC RX MED GY IP 250 OP 250 PS 637: Performed by: NURSE PRACTITIONER

## 2023-04-12 PROCEDURE — 99239 HOSP IP/OBS DSCHRG MGMT >30: CPT | Mod: 24

## 2023-04-12 PROCEDURE — 93308 TTE F-UP OR LMTD: CPT | Mod: 26 | Performed by: INTERNAL MEDICINE

## 2023-04-12 PROCEDURE — 93321 DOPPLER ECHO F-UP/LMTD STD: CPT

## 2023-04-12 PROCEDURE — 80048 BASIC METABOLIC PNL TOTAL CA: CPT | Performed by: NURSE PRACTITIONER

## 2023-04-12 PROCEDURE — 93325 DOPPLER ECHO COLOR FLOW MAPG: CPT

## 2023-04-12 PROCEDURE — 36415 COLL VENOUS BLD VENIPUNCTURE: CPT | Performed by: NURSE PRACTITIONER

## 2023-04-12 PROCEDURE — 83735 ASSAY OF MAGNESIUM: CPT | Performed by: INTERNAL MEDICINE

## 2023-04-12 PROCEDURE — 250N000013 HC RX MED GY IP 250 OP 250 PS 637

## 2023-04-12 PROCEDURE — 93325 DOPPLER ECHO COLOR FLOW MAPG: CPT | Mod: 26 | Performed by: INTERNAL MEDICINE

## 2023-04-12 PROCEDURE — 93321 DOPPLER ECHO F-UP/LMTD STD: CPT | Mod: 26 | Performed by: INTERNAL MEDICINE

## 2023-04-12 PROCEDURE — 85027 COMPLETE CBC AUTOMATED: CPT | Performed by: NURSE PRACTITIONER

## 2023-04-12 RX ORDER — AMIODARONE HYDROCHLORIDE 200 MG/1
TABLET ORAL
Qty: 9 TABLET | Refills: 0 | Status: SHIPPED | OUTPATIENT
Start: 2023-04-12 | End: 2023-04-12

## 2023-04-12 RX ORDER — LOSARTAN POTASSIUM 25 MG/1
25 TABLET ORAL DAILY
Qty: 90 TABLET | Refills: 3 | Status: SHIPPED | OUTPATIENT
Start: 2023-04-13

## 2023-04-12 RX ORDER — SPIRONOLACTONE 25 MG/1
12.5 TABLET ORAL DAILY
Qty: 45 TABLET | Refills: 3 | Status: SHIPPED | OUTPATIENT
Start: 2023-04-12 | End: 2023-04-12

## 2023-04-12 RX ORDER — LOSARTAN POTASSIUM 25 MG/1
25 TABLET ORAL DAILY
Qty: 90 TABLET | Refills: 3 | Status: SHIPPED | OUTPATIENT
Start: 2023-04-13 | End: 2023-04-12

## 2023-04-12 RX ORDER — FUROSEMIDE 40 MG
40 TABLET ORAL DAILY
Qty: 90 TABLET | Refills: 3 | Status: SHIPPED | OUTPATIENT
Start: 2023-04-13 | End: 2023-04-12

## 2023-04-12 RX ORDER — AMIODARONE HYDROCHLORIDE 200 MG/1
200 TABLET ORAL DAILY
Qty: 90 TABLET | Refills: 3 | Status: SHIPPED | OUTPATIENT
Start: 2023-04-17

## 2023-04-12 RX ORDER — SPIRONOLACTONE 25 MG
12.5 TABLET ORAL DAILY
Status: DISCONTINUED | OUTPATIENT
Start: 2023-04-12 | End: 2023-04-12 | Stop reason: HOSPADM

## 2023-04-12 RX ORDER — AMIODARONE HYDROCHLORIDE 200 MG/1
TABLET ORAL
Qty: 9 TABLET | Refills: 0 | Status: SHIPPED | OUTPATIENT
Start: 2023-04-12 | End: 2023-04-24

## 2023-04-12 RX ORDER — AMIODARONE HYDROCHLORIDE 200 MG/1
200 TABLET ORAL DAILY
Qty: 90 TABLET | Refills: 3 | Status: SHIPPED | OUTPATIENT
Start: 2023-04-17 | End: 2023-04-12

## 2023-04-12 RX ORDER — SPIRONOLACTONE 25 MG/1
12.5 TABLET ORAL DAILY
Qty: 45 TABLET | Refills: 3 | Status: SHIPPED | OUTPATIENT
Start: 2023-04-12

## 2023-04-12 RX ORDER — FUROSEMIDE 40 MG
40 TABLET ORAL DAILY
Qty: 90 TABLET | Refills: 3 | Status: SHIPPED | OUTPATIENT
Start: 2023-04-13 | End: 2023-09-26

## 2023-04-12 RX ADMIN — AMIODARONE HYDROCHLORIDE 200 MG: 200 TABLET ORAL at 08:13

## 2023-04-12 RX ADMIN — LOSARTAN POTASSIUM 25 MG: 25 TABLET, FILM COATED ORAL at 08:13

## 2023-04-12 RX ADMIN — SPIRONOLACTONE 12.5 MG: 25 TABLET, FILM COATED ORAL at 10:55

## 2023-04-12 RX ADMIN — APIXABAN 2.5 MG: 2.5 TABLET, FILM COATED ORAL at 08:13

## 2023-04-12 RX ADMIN — FUROSEMIDE 40 MG: 40 TABLET ORAL at 08:13

## 2023-04-12 RX ADMIN — EMPAGLIFLOZIN 10 MG: 10 TABLET, FILM COATED ORAL at 08:13

## 2023-04-12 ASSESSMENT — ACTIVITIES OF DAILY LIVING (ADL)
ADLS_ACUITY_SCORE: 27
ADLS_ACUITY_SCORE: 27
ADLS_ACUITY_SCORE: 26
ADLS_ACUITY_SCORE: 27

## 2023-04-12 NOTE — PROGRESS NOTES
DISCHARGE     Discharged to: Home  Via: Automobile  Accompanied by: Family, son, Nahum  Discharge Instructions: Diet, activity, medications, follow-up appointments, when to call the MD, and what to watch out for (i.e. s/s of infection, increasing SOB, palpitations, chest pain, etc.)  Prescriptions: To be filled by [facility] pharmacy per patient's request; medication list reviewed & sent with patient  Follow Up Appointments: Arranged; information given  Belongings: All sent with patient  IV: Out  Telemetry: Off  Patient exhibits understanding of above discharge instructions; all questions answered.  Discharge Paperwork: Faxed

## 2023-04-12 NOTE — PROGRESS NOTES
"[id number]     NEURO: A&O x4; calls appropriately, able to make needs known   RESPIRATORY: Room air during day; prefers to be on 1L NC overnight/while sleeping  CARDIAC: SR; HR 80-90s with PVCs/PACs   GI/: Voiding adequately; no BM this shift   SKIN: No overt deficits  PAIN: Denies   TESTS/PROCEDURES: ECHO completed this AM;   MOBILITY: SBA with walker   DIET: Regular   LDAs: PIV x2; will be removed prior to discharge     PLAN: Plan to discharge today; Continue POC; notify the primary team with any concerns/updates.     /79   Pulse 75   Temp 97.5  F (36.4  C) (Oral)   Resp 16   Ht 1.565 m (5' 1.61\")   Wt 45.8 kg (100 lb 15.5 oz)   SpO2 95%   BMI 18.70 kg/m      "

## 2023-04-12 NOTE — DISCHARGE INSTRUCTIONS
Take your medicines every day, as directed     Changes made today:     START Jardiance 10mg  START Losartan 25mg daily  START Amiodarone 200mg TWICE daily through 4/16, then start 200mg ONCE daily on 4/17.   START Lasix 40mg daily  START Spironolactone 12.5mg daily  STOP Osmiertinib and follow up with your oncologist due to prolonged QTc  STOP Diltiazem  STOP Lopressor (Metoprolol)     Please come to the ER if you are having nausea that prevents you from taking your medications, vomiting, chest pain that is different than your normal, inability to stand up or complete your daily tasks because of dizziness, passing out, shortness of breath that is worsening       Monitor Your Weight and Symptoms     Contact us if you:     Gain 2 pounds in one day or 5 pounds in one week  Feel more short of breath  Notice more leg swelling  Feel lightheadeded    Change your lifestyle     Limit Salt or Sodium:  2000 mg  Limit Fluids:  2000 mL or approximately 64 ounces  Eat a Heart Healthy Diet  Low in saturated fats  Stay Active:  Aim to move at least 150 minutes every  week            To Contact us     During Business Hours:  [phone], option # 1      After hours, weekends or holidays:   [phone], Option #4  Ask to speak to the On-Call Cardiologist. Inform them you are a CORE/heart failure patient at the [facility].       Use PeerIndex allows you to communicate directly with your heart team through secure messaging.  [employer] can be accessed any time on your phone, computer, or tablet.  If you need assistance, we'd be happy to help!             Keep your Heart Appointments:     - Please keep your CORE clinic follow up on 4/18/23.     - Please schedule follow up with your oncologist to discuss having to stop your Osmiertinib.

## 2023-04-12 NOTE — PLAN OF CARE
Temp: 97.8  F (36.6  C) Temp src: Oral BP: 122/86 Pulse: 84   Resp: 16 SpO2: 97 % O2 Device: Nasal cannula Oxygen Delivery: 1 LPM     Diagnosis: recurrent Afib with RVR, hypervolemia  History of paroxysmal Afib s/p DCCV (3/27), pericardial effusion s/p pericardiocentesis (2022), R lung adenocarcinoma, hemoptysis, HTN, TIA, splenic & renal infarcts    Neuro: A&Ox4, denied headache or dizziness.  Cardiac: Tele in place, SR.   Respiratory: O2 sating >93% on 1L NC overnight for comfort.  Vitals: VSS, afebrile, denied pain  GI/: Brief but of nausea @ 0315, elevated patient head higher and pt sipped water with relief of symptoms. One small BM overnight. Voided 150 mL overnight.  Skin: Left PIV in place, SL. Right PIV in place, SL.  Mobility: Up SBA with walker to bathroom x2.   Rest: Slept well overnight.    P: Continue to monitor and follow POC. Notify CARDS 1 with changes.      [id number]

## 2023-04-14 ENCOUNTER — PATIENT OUTREACH (OUTPATIENT)
Dept: CARE COORDINATION | Facility: CLINIC | Age: 88
End: 2023-04-14
Payer: MEDICARE

## 2023-04-14 ENCOUNTER — LAB REQUISITION (OUTPATIENT)
Dept: LAB | Facility: CLINIC | Age: 88
End: 2023-04-14
Payer: MEDICARE

## 2023-04-14 ENCOUNTER — NURSE TRIAGE (OUTPATIENT)
Dept: NURSING | Facility: CLINIC | Age: 88
End: 2023-04-14
Payer: MEDICARE

## 2023-04-14 DIAGNOSIS — R53.83 OTHER FATIGUE: ICD-10-CM

## 2023-04-14 LAB
TSH SERPL DL<=0.005 MIU/L-ACNC: 4.51 UIU/ML (ref 0.3–4.2)
VIT B12 SERPL-MCNC: 3389 PG/ML (ref 232–1245)

## 2023-04-14 PROCEDURE — 82607 VITAMIN B-12: CPT | Mod: ORL | Performed by: NURSE PRACTITIONER

## 2023-04-14 PROCEDURE — 82306 VITAMIN D 25 HYDROXY: CPT | Mod: ORL | Performed by: NURSE PRACTITIONER

## 2023-04-14 PROCEDURE — 84443 ASSAY THYROID STIM HORMONE: CPT | Mod: ORL | Performed by: NURSE PRACTITIONER

## 2023-04-14 PROCEDURE — 84439 ASSAY OF FREE THYROXINE: CPT | Mod: ORL | Performed by: NURSE PRACTITIONER

## 2023-04-14 NOTE — PROGRESS NOTES
"Clinic Care Coordination Contact  [facility]: Post-Discharge Note  SITUATION                                                      Admission:    Admission Date: 04/09/23   Reason for Admission: Atrial fibrillation with rapid ventricular response  Discharge:   Discharge Date: 04/12/23  Discharge Diagnosis: Atrial fibrillation with rapid ventricular response    BACKGROUND                                                      Per hospital discharge summary and inpatient provider notes:  Barbi Rebolledo is a 89 year old year old female with PMH of stage IV adenocarcinoma of right lung, PAF (CHADSVASC 6 on Eliquis) s/p DCCV 3/27/23, hemoptysis, pericardial effusion s/p pericardiocentesis 2/2022 (cytology negative), 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 Now admitted with recurrent AF with RVR and hypervolemia. She was diuresed with IV Lasix and started on amiodarone gtt before being transitioned to PO amiodarone. Additionally, she underwent successful DCCV on 4/11/23. She is discharged home in stable condition with outpatient cardiology follow up    ASSESSMENT           Discharge Assessment  How are you doing now that you are home?: \" Not sure not feeling right\"  How are your symptoms? (Red Flag symptoms escalate to triage hotline per guidelines): New  Do you feel your condition is stable enough to be safe at home until your provider visit?: No (see comment) (\" Unsure called Triage nurse\")  Does the patient have their discharge instructions? : Yes  Does the patient have questions regarding their discharge instructions? : (S) Yes (see comment) (\"called Triage\")  Were you started on any new medications or were there changes to any of your previous medications? : Yes  Does the patient have all of their medications?: Yes  Do you have questions regarding any of your medications? : No  Do you have all of your needed medical supplies or equipment (DME)?  (i.e. oxygen tank, CPAP, cane, " etc.): Yes  Discharge follow-up appointment scheduled within 14 calendar days? : Yes  Discharge Follow Up Appointment Date: 04/27/23  Discharge Follow Up Appointment Scheduled with?: Specialty Care Provider  Is patient agreeable to assistance with scheduling? : No    Post-op ([facility] CTA Only)  If the patient had a surgery or procedure, do they have any questions for a nurse?: (S) Yes (see comment) (Called triage- which told here to go to clinic today or ER)             PLAN                                                      Outpatient Plan:  CORE Follow-up on 4/18/23     Labs or imaging requiring follow-up after discharge:  - Lab work at CORE follow up      Future Appointments   Date Time Provider Department Center   4/27/2023  2:00 PM Kaylah Dyer PA-C [facility]   4/28/2023 10:30 AM [facility]   4/28/2023 11:00 AM Herb Zuñiga MD [facility]   5/22/2023  2:30 PM Brian Coulter MD [facility]   7/14/2023 10:00 AM Daisy Mendenhall MD [facility]   8/15/2023  8:10 AM Laz Gasca MD [facility]         For any urgent concerns, please contact our 24 hour nurse triage line: [phone] ([phone])         Darlene Sumner MA

## 2023-04-14 NOTE — TELEPHONE ENCOUNTER
They have questions about some symptoms she's having. SonNahum talking on the phone now. It was a care assistant that inititated the call. Hospital for three days, home Wednesday. Yesterday first full day home. There was three times when she got up and moved around had extreme fatigue and dizziness, lasted half an hour then would go away. Had shortness of breath. Wondering, went thru paper work, told to call a RN. Today is better, slept well.  97.7. heart rate in 80's. She doesn't want to go in anywhere.  They will call [facility] where she is a patient and will see if they can get her in today. I told him if they can't get her in, she should be seen at urgent care or the ER.  Susanne Sheppard RN  [facility]      Reason for Disposition    MODERATE dizziness (e.g., interferes with normal activities) (Exception: dizziness caused by heat exposure, sudden standing, or poor fluid intake)    Additional Information    Negative: SEVERE difficulty breathing (e.g., struggling for each breath, speaks in single words)    Negative: Shock suspected (e.g., cold/pale/clammy skin, too weak to stand, low BP, rapid pulse)    Negative: Difficult to awaken or acting confused (e.g., disoriented, slurred speech)    Negative: Fainted, and still feels dizzy afterwards    Negative: Overdose (accidental or intentional) of medications    Negative: New neurologic deficit that is present now: * Weakness of the face, arm, or leg on one side of the body * Numbness of the face, arm, or leg on one side of the body * Loss of speech or garbled speech    Negative: Heart beating < 50 beats per minute OR > 140 beats per minute    Negative: Sounds like a life-threatening emergency to the triager    Negative: Chest pain    Negative: Rectal bleeding, bloody stool, or tarry-black stool    Negative: Vomiting is main symptom    Negative: Diarrhea is main symptom    Negative: Headache is main symptom    Negative: Heat exhaustion suspected (i.e.,  dehydration from heat exposure)    Negative: Patient states that they are having an anxiety or panic attack    Negative: Dizziness from low blood sugar (i.e., < 60 mg/dl or 3.5 mmol/l)    Negative: SEVERE dizziness (e.g., unable to stand, requires support to walk, feels like passing out now)    Negative: SEVERE headache or neck pain    Negative: Spinning or tilting sensation (vertigo) present now and one or more stroke risk factors (i.e., hypertension, diabetes mellitus, prior stroke/TIA, heart attack, age over 60) (Exception: prior physician evaluation for this AND no different/worse than usual)    Negative: Neurologic deficit that was brief (now gone), ANY of the following:* Weakness of the face, arm, or leg on one side of the body* Numbness of the face, arm, or leg on one side of the body* Loss of speech or garbled speech    Negative: Loss of vision or double vision  (Exception: Similar to previous migraines.)    Negative: Extra heart beats OR irregular heart beating (i.e., 'palpitations')    Negative: Difficulty breathing    Negative: Drinking very little and has signs of dehydration (e.g., no urine > 12 hours, very dry mouth, very lightheaded)    Negative: Follows bleeding (e.g., stomach, rectum, vagina)  (Exception: Became dizzy from sight of small amount blood.)    Negative: Patient sounds very sick or weak to the triager    Negative: Lightheadedness (dizziness) present now, after 2 hours of rest and fluids    Negative: Spinning or tilting sensation (vertigo) present now    Negative: Fever > 103 F (39.4 C)    Negative: Fever > 100.0 F (37.8 C) and has diabetes mellitus or a weak immune system (e.g., HIV positive, cancer chemotherapy, organ transplant, splenectomy, chronic steroids)    Protocols used: DIZZINESS-A-OH

## 2023-04-15 LAB — T4 FREE SERPL-MCNC: 1.77 NG/DL (ref 0.9–1.7)

## 2023-04-16 ENCOUNTER — TELEPHONE (OUTPATIENT)
Dept: CARDIOLOGY | Facility: CLINIC | Age: 88
End: 2023-04-16
Payer: MEDICARE

## 2023-04-16 LAB — DEPRECATED CALCIDIOL+CALCIFEROL SERPL-MC: 16 UG/L (ref 20–75)

## 2023-04-16 NOTE — TELEPHONE ENCOUNTER
"Received call from Pt son Nahum around 3:40 PM. Re: Ms Rebolledo feeling fatigued, \"wiped out\". Has been eating and drinking okay. BP on new medication regimen has been 90s-100 SBP. HR 70-80, appears regular. No chest pain, shortness of breath, syncope, feeling like she is going to faint. Reviewed AVS medications and Medical diagnoses with Nahum. Overall if any concern for low BP and symptoms would have her present to nearest ER . However, did discuss holding Losartan dosage tonight 25 mg and amiodarone dosage 200 mg tonight and touching base with the CORE clinic tomorrow. Agreeable to plan.    Juan Pablo Rodriguez MD  Cardiology Fellow PGY 6  "

## 2023-04-17 ENCOUNTER — TELEPHONE (OUTPATIENT)
Dept: CARDIOLOGY | Facility: CLINIC | Age: 88
End: 2023-04-17
Payer: MEDICARE

## 2023-04-17 NOTE — TELEPHONE ENCOUNTER
[facility] Call Center    Phone Message    May a detailed message be left on voicemail: yes     Reason for Call: Other: pt's son spoke w/someone w/[facility] over the weekend because pt's BP was so low - did get down to 70/53.  Was advised to stop the Losartan amd to take more Amiodarone until this morning and then to return to 200 mg on Monday.  Pt's weight is down 3 pounds today at 96.5#.  Nahum is concerned about the weight and would like to know what to adjust w/her Lasix.  Please call Nahum to advise.     Action Taken: Message routed to:  [facility] cardio    Travel Screening: Not Applicable     Thank you!  [facility]

## 2023-04-18 ENCOUNTER — MYC MEDICAL ADVICE (OUTPATIENT)
Dept: CARDIOLOGY | Facility: CLINIC | Age: 88
End: 2023-04-18
Payer: MEDICARE

## 2023-04-20 ENCOUNTER — VIRTUAL VISIT (OUTPATIENT)
Dept: CARDIOLOGY | Facility: CLINIC | Age: 88
End: 2023-04-20
Attending: INTERNAL MEDICINE
Payer: MEDICARE

## 2023-04-20 DIAGNOSIS — I48.0 PAROXYSMAL ATRIAL FIBRILLATION (H): Primary | ICD-10-CM

## 2023-04-20 DIAGNOSIS — I50.23 ACUTE ON CHRONIC SYSTOLIC HEART FAILURE (H): ICD-10-CM

## 2023-04-20 PROCEDURE — G0463 HOSPITAL OUTPT CLINIC VISIT: HCPCS | Mod: PN,GT

## 2023-04-20 PROCEDURE — 99215 OFFICE O/P EST HI 40 MIN: CPT | Mod: 24

## 2023-04-20 NOTE — NURSING NOTE
Chief Complaint   Patient presents with     Follow Up     Follow up for medication management. Medications/allergies reviewed with pt, pt would like to discuss Lasix, Losartan, and Spironolactone today, as she is currently holding these medications per son.        Is the patient currently in the state of [place]? YES    Visit mode:VIDEO    If the visit is dropped, the patient can be reconnected by: VIDEO VISIT: Send to e-mail at: [email or web address]    Will anyone else be joining the visit? YES: Son Nahum will be joining visit today.      How would you like to obtain your AVS? MyChart    Are changes needed to the allergy or medication list? YES: Pt would like to discuss Lasix, Losartan, and Spironolactone today, as she is currently holding these medications per son.     Patient denies any changes since check-in regarding medication and allergies and states all information entered during check-in remains accurate.    Sania Best, Visit Facilitator/MA.

## 2023-04-20 NOTE — PROGRESS NOTES
Virtual Visit Details    Type of service:  Video Visit   Video Start Time: 10:59 AM  Video End Time: 11:32    Originating Location (pt. Location): Home  Distant Location (provider location):  On-site  Platform used for Video Visit: Today Tix      ELECTROPHYSIOLOGY VIDEO VISIT    Assessment/Recommendations   Assessment/Plan:    Barbi Rebolledo is 89 year old female with past medical history significant for stage IV adenocarcinoma of right lung, PAF (CHADSVASC 7), pericardial effusion s/p pericardiocentesis 2/2022, HTN, and TIA.      Paroxsymal Atrial Fibrillation:   We discussed in detail with the patient management/treatment options for A.fib. A fib occurring in the setting of advanced age, left atrial enlargement, mod-severe MR and metastatic cancer.   1. Stroke Prophylaxis:  CHADSVASC=7 ++age, +gender, ++TIA, +HTN, +HFrEF corresponding to a 9.8% annual stroke / systemic emolism event rate. indicating need for long term oral anticoagulation.  She is on Eliquis now appropriately at 2.5 mg BID. She had some history of hemoptysis so will need to monitor tolerance, tolerating Eliquis okay for now.   2. Rate Control: Previous side effects to metoprolol. Was switched to Diltiazem which was then stopped due to intolerance.   3. Rhythm Control: S/p DCCV 3/27/23. She then had recurrence of A fib with RVR on 4/9 with associated hypervolemia that prompted return ER visit. Echo done at this time demonstrated EF of 5-10%, moderate-severe MR she was started on amio bolus + gtt with repeat DCCV on 4/11 with successful conversion to sinus. She was discharged on PO amio. Given her co-morbidities, amiodarone is likely the best option at this time (acknowledge that LFTs have had mild elevation). Continue amio 200 mg daily. Since discharge from the hospital she has reports feeling fatigued with low blood pressures and weight loss.     Discussed complexity of atrial fibrillation and acute on chronic HFrEF in the setting of advanced stage  IV adenocarcinoma of the lung. Per oncology note, pt with metastasis to brain, thoracic lymph nodes and liver with expectation that metastasis will start to cause frequent symptoms and further complications. Oncology is thinking of starting another chemo agent, that prolongs QT and has concerns with doing so while patient is on amiodarone. Per patients son, they are being seen by both cardiology and oncology for joint discussion about goals of care at [facility] this Tuesday, April 25th. She has been holding Lasix, Spirolactone and Losartan, I instructed her to continue holding these medications and recording her blood pressure and symptoms. Her and her son take her weight daily, she will notify us if she starts to experience any weight gain (>3lbs). Can re-evaluate symptoms and blood pressures at [facility] cardiology visit on Tuesday or at apt with Dr Acosta on Thursday.      May transfer all care, including cardiology, to [facility]. If she does not, follow up at scheduled apt with Dr Acosta on 4/28.      History of Present Illness/Subjective    Ms. Barbi Rebolledo is a 89 year old female who comes in today for EP follow-up of atrial fibrillation.    Patient is a 89 year old female who has a medical history significant for stage IV adenocarcinoma of right lung, PAF (CHADSVASC 6), off anticoagulation due to hemoptysis, pericardial effusion s/p pericardiocentesis 2/2022 (cytology negative), HTN, and TIA.     She was recently admitted on 3/21/23 with AF with RVR. She had acutely developed palpitations and dyspnea that day. Previously, with these symptoms she would take PO diltiazem and they would resolve; however, when this episode persisted she came in for evaluation. She was started on IV diltiazem and spontaneously converted.  Echo had shown LVEF 45-50% on 3/22/23. Her AF then recurred and she was cleared to start anticoagulation again after new splenic and renal infarcts. She was having some hypotension with the AF. She then  "underwent IDALMIS/DCCV on 3/27/23 with successful conversion to sinus rhythm. She was discharged on 3/28.     She then presented to the ER on 4/9 with recurrent AF with RVR and hypervolemia. Amiodarone best option for AAT given her co-morbidities and early recurrence after recent DCCV on 3/27. Amio bolus + gtt given with plans for repeat cardioversion once amio loaded if patient didn't chemically convert. Repeat echo done on 4/10 while in AF with RVR demonstrated EF 10-20% (previously 45-50%) with mod-severe MR. She was started on Jardiance and Aldactone during admission. Repeat cardioversion done on 4/11 with successful conversion to sinus rhythm. She was discharged on 4/12 with CORE follow up.     She presents today for further discussion with her son Nahum. On 4/14 pt began to feel \"wiped out\". This was initially discussed with her PCP who advised her to move Losartan to pm dosing. They did this for one night without change in symptoms. Then called in Sunday 4/16 after having low Bps (70/50) and was instructed to hold Losartan, Lasix and Aldactone. Since Sunday, she has continued to hold these three medications. She notes mild improvement in her symptoms, but not much. Wakes up feeling well, takes her meds, and by noon feels out of it with extreme fatigue. Has noted slow weight loss as well. BP in am ~118/67, when taken in afternoon 80-95/50-60. Reports Bps haven't increased much since holding medications.       I have reviewed and updated the patient's Past Medical History, Social History, Family History and Medication List.     Cardiographics (Personally Reviewed) :   Echo: 4/12/23  Interpretation Summary  Left ventricular function is severely decreased. The ejection fraction is  estimated 20-25%.  Global right ventricular function is mildly reduced.  The right atria appears normal. Severe left atrial enlargement is present.  Compared to previous study of 04/10/2023, LV function is minimally improved.    Echo: " 4/10/23  Interpretation Summary  The patient's rhythm is atrial fibrillation with RVR.  Left ventricular function is decreased. The ejection fraction is 10-20%  (severely reduced).  Severe diffuse hypokinesis is present with large area of apical wall akinesis.  Global right ventricular function is moderately reduced.  Moderate to severe mitral insufficiency is present.  IVC diameter >2.1 cm collapsing <50% with sniff suggests a high RA pressure  estimated at 15 mmHg or greater.  No pericardial effusion is present.  A pleural effusion is present.  Compared to prior study, LV fxn and MR are worse.         Physical Examination   There were no vitals taken for this visit.  Wt Readings from Last 3 Encounters:   04/12/23 45.8 kg (100 lb 15.5 oz)   03/28/23 47.4 kg (104 lb 9.6 oz)   12/20/21 49.5 kg (109 lb 3.2 oz)     General Appearance:   Alert, well-appearing and in no acute distress. Sitting in wheelchair    HEENT: Atraumatic, normocephalic.    Chest/Lungs:   Respirations unlabored.     Cardiovascular:   No edema of visualized extremities.     Psych: Affect appropriate, but limited insight.    Skin: No rashes visualized on face/neck/upper extremities          Medications  Allergies   Amiodarone 200 mg twice daily until 4/16 then once daily   Eliquis 2.5 mg twice daily   Jardiance 10 mg daily   Lasix 40 mg daily (holding)  Losartan 25 mg daily (holding)  Aldactone 12.5 mg daily (holding)   Allergies   Allergen Reactions     Brevital Sodium [Methohexital] Fatigue     Was aware of surroundings but unable to move, gave her significant anxiety after given for DCCV 3 hours after administered 30g     Fentanyl Other (See Comments)     Sedation     Lisinopril Swelling     Swelling of tongue     Metoprolol Dizziness and Cough     Novocain [Procaine] Other (See Comments)     Tachycardia         Lab Results (Personally Reviewed)    Chemistry/lipid CBC Cardiac Enzymes/BNP/TSH/INR   Lab Results   Component Value Date    BUN 18.9  04/12/2023     (L) 04/12/2023    CO2 23 04/12/2023     Creatinine   Date Value Ref Range Status   04/12/2023 0.77 0.51 - 0.95 mg/dL Final   05/01/2018 0.58 0.52 - 1.04 mg/dL Final       Lab Results   Component Value Date    CHOL 199 10/13/2005    HDL 65 10/13/2005     10/13/2005      Lab Results   Component Value Date    WBC 5.6 04/12/2023    HGB 12.2 04/12/2023    HCT 37.9 04/12/2023    MCV 93 04/12/2023     04/12/2023    Lab Results   Component Value Date    TSH 4.51 (H) 04/14/2023    INR 1.33 (H) 04/10/2023        The patient states understanding and is agreeable with the plan.     Kaylah Dyer PA-C  [facility]  Electrophysiology Consult Service  Pager: [phone]    I spent a total of 33 minutes on video visit with Barbi Rebolledo during today's office visit. I have spent an additional 45 minutes today on chart review and documentation.

## 2023-04-20 NOTE — TELEPHONE ENCOUNTER
Hello,     I'm happy to see her tomorrow to discuss her amiodarone dosing after cardioversion. I can discuss her questions about her HF medications and associated symptoms as well until her follow up apt with Dr Sepulveda next week.    Thanks  Kaylah

## 2023-04-20 NOTE — LETTER
4/20/2023      RE: Barbi Rebolledo  [address]  [place] [id number]       Dear Colleague,    Thank you for the opportunity to participate in the care of your patient, Barbi Rebolledo, at the [facility] [place] [facility]. Please see a copy of my visit note below.        ELECTROPHYSIOLOGY VIDEO VISIT    Assessment/Recommendations   Assessment/Plan:    Barbi eRbolledo is 89 year old female with past medical history significant for stage IV adenocarcinoma of right lung, PAF (CHADSVASC 7), pericardial effusion s/p pericardiocentesis 2/2022, HTN, and TIA.      Paroxsymal Atrial Fibrillation:   We discussed in detail with the patient management/treatment options for A.fib. A fib occurring in the setting of advanced age, left atrial enlargement, mod-severe MR and metastatic cancer.   1. Stroke Prophylaxis:  CHADSVASC=7 ++age, +gender, ++TIA, +HTN, +HFrEF corresponding to a 9.8% annual stroke / systemic emolism event rate. indicating need for long term oral anticoagulation.  She is on Eliquis now appropriately at 2.5 mg BID. She had some history of hemoptysis so will need to monitor tolerance, tolerating Eliquis okay for now.   2. Rate Control: Previous side effects to metoprolol. Was switched to Diltiazem which was then stopped due to intolerance.   3. Rhythm Control: S/p DCCV 3/27/23. She then had recurrence of A fib with RVR on 4/9 with associated hypervolemia that prompted return ER visit. Echo done at this time demonstrated EF of 5-10%, moderate-severe MR she was started on amio bolus + gtt with repeat DCCV on 4/11 with successful conversion to sinus. She was discharged on PO amio. Given her co-morbidities, amiodarone is likely the best option at this time (acknowledge that LFTs have had mild elevation). Continue amio 200 mg daily. Since discharge from the hospital she has reports feeling fatigued with low blood pressures and weight loss.      Discussed complexity of atrial fibrillation and acute on chronic HFrEF in the setting of advanced stage IV adenocarcinoma of the lung. Per oncology note, pt with metastasis to brain, thoracic lymph nodes and liver with expectation that metastasis will start to cause frequent symptoms and further complications. Oncology is thinking of starting another chemo agent, that prolongs QT and has concerns with doing so while patient is on amiodarone. Per patients son, they are being seen by both cardiology and oncology for joint discussion about goals of care at [place] this Tuesday, April 25th. She has been holding Lasix, Spirolactone and Losartan, I instructed her to continue holding these medications and recording her blood pressure and symptoms. Her and her son take her weight daily, she will notify us if she starts to experience any weight gain (>3lbs). Can re-evaluate symptoms and blood pressures at [place] cardiology visit on Tuesday or at apt with Dr Acosta on Thursday.      May transfer all care, including cardiology, to [facility]. If she does not, follow up at scheduled apt with Dr Acosta on 4/28.      History of Present Illness/Subjective    Ms. Barbi Rebolledo is a 89 year old female who comes in today for EP follow-up of atrial fibrillation.    Patient is a 89 year old female who has a medical history significant for stage IV adenocarcinoma of right lung, PAF (CHADSVASC 6), off anticoagulation due to hemoptysis, pericardial effusion s/p pericardiocentesis 2/2022 (cytology negative), HTN, and TIA.     She was recently admitted on 3/21/23 with AF with RVR. She had acutely developed palpitations and dyspnea that day. Previously, with these symptoms she would take PO diltiazem and they would resolve; however, when this episode persisted she came in for evaluation. She was started on IV diltiazem and spontaneously converted.  Echo had shown LVEF 45-50% on 3/22/23. Her AF then recurred and she was cleared to start  "anticoagulation again after new splenic and renal infarcts. She was having some hypotension with the AF. She then underwent IDALMIS/DCCV on 3/27/23 with successful conversion to sinus rhythm. She was discharged on 3/28.     She then presented to the ER on 4/9 with recurrent AF with RVR and hypervolemia. Amiodarone best option for AAT given her co-morbidities and early recurrence after recent DCCV on 3/27. Amio bolus + gtt given with plans for repeat cardioversion once amio loaded if patient didn't chemically convert. Repeat echo done on 4/10 while in AF with RVR demonstrated EF 10-20% (previously 45-50%) with mod-severe MR. She was started on Jardiance and Aldactone during admission. Repeat cardioversion done on 4/11 with successful conversion to sinus rhythm. She was discharged on 4/12 with CORE follow up.     She presents today for further discussion with her son Nahum. On 4/14 pt began to feel \"wiped out\". This was initially discussed with her PCP who advised her to move Losartan to pm dosing. They did this for one night without change in symptoms. Then called in Sunday 4/16 after having low Bps (70/50) and was instructed to hold Losartan, Lasix and Aldactone. Since Sunday, she has continued to hold these three medications. She notes mild improvement in her symptoms, but not much. Wakes up feeling well, takes her meds, and by noon feels out of it with extreme fatigue. Has noted slow weight loss as well. BP in am ~118/67, when taken in afternoon 80-95/50-60. Reports Bps haven't increased much since holding medications.       I have reviewed and updated the patient's Past Medical History, Social History, Family History and Medication List.     Cardiographics (Personally Reviewed) :   Echo: 4/12/23  Interpretation Summary  Left ventricular function is severely decreased. The ejection fraction is  estimated 20-25%.  Global right ventricular function is mildly reduced.  The right atria appears normal. Severe left atrial " enlargement is present.  Compared to previous study of 04/10/2023, LV function is minimally improved.    Echo: 4/10/23  Interpretation Summary  The patient's rhythm is atrial fibrillation with RVR.  Left ventricular function is decreased. The ejection fraction is 10-20%  (severely reduced).  Severe diffuse hypokinesis is present with large area of apical wall akinesis.  Global right ventricular function is moderately reduced.  Moderate to severe mitral insufficiency is present.  IVC diameter >2.1 cm collapsing <50% with sniff suggests a high RA pressure  estimated at 15 mmHg or greater.  No pericardial effusion is present.  A pleural effusion is present.  Compared to prior study, LV fxn and MR are worse.         Physical Examination   There were no vitals taken for this visit.  Wt Readings from Last 3 Encounters:   04/12/23 45.8 kg (100 lb 15.5 oz)   03/28/23 47.4 kg (104 lb 9.6 oz)   12/20/21 49.5 kg (109 lb 3.2 oz)     General Appearance:   Alert, well-appearing and in no acute distress. Sitting in wheelchair    HEENT: Atraumatic, normocephalic.    Chest/Lungs:   Respirations unlabored.     Cardiovascular:   No edema of visualized extremities.     Psych: Affect appropriate, but limited insight.    Skin: No rashes visualized on face/neck/upper extremities          Medications  Allergies   Amiodarone 200 mg twice daily until 4/16 then once daily   Eliquis 2.5 mg twice daily   Jardiance 10 mg daily   Lasix 40 mg daily (holding)  Losartan 25 mg daily (holding)  Aldactone 12.5 mg daily (holding)   Allergies   Allergen Reactions    Brevital Sodium [Methohexital] Fatigue     Was aware of surroundings but unable to move, gave her significant anxiety after given for DCCV 3 hours after administered 30g    Fentanyl Other (See Comments)     Sedation    Lisinopril Swelling     Swelling of tongue    Metoprolol Dizziness and Cough    Novocain [Procaine] Other (See Comments)     Tachycardia         Lab Results (Personally  Reviewed)    Chemistry/lipid CBC Cardiac Enzymes/BNP/TSH/INR   Lab Results   Component Value Date    BUN 18.9 04/12/2023     (L) 04/12/2023    CO2 23 04/12/2023     Creatinine   Date Value Ref Range Status   04/12/2023 0.77 0.51 - 0.95 mg/dL Final   05/01/2018 0.58 0.52 - 1.04 mg/dL Final       Lab Results   Component Value Date    CHOL 199 10/13/2005    HDL 65 10/13/2005     10/13/2005      Lab Results   Component Value Date    WBC 5.6 04/12/2023    HGB 12.2 04/12/2023    HCT 37.9 04/12/2023    MCV 93 04/12/2023     04/12/2023    Lab Results   Component Value Date    TSH 4.51 (H) 04/14/2023    INR 1.33 (H) 04/10/2023        The patient states understanding and is agreeable with the plan.     Kaylah Dyer PA-C  [facility]  Electrophysiology Consult Service  Pager: [phone]    I spent a total of 33 minutes on video visit with Barbi Rebolledo during today's office visit. I have spent an additional 45 minutes today on chart review and documentation.                       Please do not hesitate to contact me if you have any questions/concerns.     Sincerely,     Kaylah Dyer PA-C

## 2023-04-23 NOTE — TELEPHONE ENCOUNTER
Patient reported not taking Lasix when she was last seen on 4/20.  I would recommend her to monitor her weight daily and if she gains more than 3 pounds per day she should restart her Lasix once a day.    DR.CAN

## 2023-04-24 ENCOUNTER — PRE VISIT (OUTPATIENT)
Dept: CARDIOLOGY | Facility: CLINIC | Age: 88
End: 2023-04-24
Payer: MEDICARE

## 2023-04-24 DIAGNOSIS — I50.21 ACUTE SYSTOLIC HEART FAILURE (H): Primary | ICD-10-CM

## 2023-04-24 NOTE — TELEPHONE ENCOUNTER
Date: 4/24/2023    Time of Call: 7:42 AM     Diagnosis:  HF     [ TORB ] Ordering provider: Dr. Bains   Order: Stop Lasix; restart Lasix at 40 mg daily if pt gains more than 3 pounds in 1 day.      Order received by: Justine Robbins RN       Follow-up/additional notes: Called and talked to Nahum, pt son. Relayed Dr. Bains recommendation. Nahum states that they saw Cardiologist last Friday at [place] where pt gets her cancer treatments and they recommended holding the Lasix as well.     Pt BP's have stabilized; 120's/60's, weight 1005# today. No reports of swelling, shortness of breath. Advised Nahum to please call with any questions or changes in BP or weight. Verbalized understanding.

## 2023-07-20 ENCOUNTER — TRANSCRIBE ORDERS (OUTPATIENT)
Dept: OTHER | Age: 88
End: 2023-07-20

## 2023-07-20 DIAGNOSIS — H91.90 HEARING LOSS, UNSPECIFIED HEARING LOSS TYPE, UNSPECIFIED LATERALITY: Primary | ICD-10-CM

## 2023-07-25 ENCOUNTER — MEDICAL CORRESPONDENCE (OUTPATIENT)
Dept: HEALTH INFORMATION MANAGEMENT | Facility: CLINIC | Age: 88
End: 2023-07-25
Payer: MEDICARE

## 2023-07-26 ENCOUNTER — TRANSCRIBE ORDERS (OUTPATIENT)
Dept: OTHER | Age: 88
End: 2023-07-26

## 2023-07-26 DIAGNOSIS — H61.23 BILATERAL IMPACTED CERUMEN: ICD-10-CM

## 2023-07-26 DIAGNOSIS — H91.90 HEARING LOSS, UNSPECIFIED HEARING LOSS TYPE, UNSPECIFIED LATERALITY: Primary | ICD-10-CM

## 2023-07-27 NOTE — TELEPHONE ENCOUNTER
FUTURE VISIT INFORMATION      FUTURE VISIT INFORMATION:  Date: 9/26/23  Time: 11:20am  Location: [facility]  REFERRAL INFORMATION:  Referring provider:  Ken Mccarty NP   Referring providers clinic:  [facility]   Reason for visit/diagnosis  per patient wax removal confirmed [facility]     RECORDS REQUESTED FROM:       Clinic name Comments Records Status Imaging Status   [facility]  7/25/23, 7/18/23- referral and note  from Ken Mccarty NP  Scanned in epic & CE    [facility]  6/22/23- MR Brain  [facility]  8/17/23- Pending req  PACS      August 17, 2023 1:29 PM - Faxed a request to [facility] to push Image to [place] PACS- Annika   August 25, 2023 4:08 PM - Received image in pacs and attached it to patient- Annika

## 2023-08-15 ENCOUNTER — OFFICE VISIT (OUTPATIENT)
Dept: DERMATOLOGY | Facility: CLINIC | Age: 88
End: 2023-08-15
Payer: MEDICARE

## 2023-08-15 DIAGNOSIS — Z85.828 HX OF BASAL CELL CARCINOMA: ICD-10-CM

## 2023-08-15 DIAGNOSIS — D48.9 NEOPLASM OF UNCERTAIN BEHAVIOR: Primary | ICD-10-CM

## 2023-08-15 PROCEDURE — 11102 TANGNTL BX SKIN SINGLE LES: CPT | Mod: GC | Performed by: DERMATOLOGY

## 2023-08-15 PROCEDURE — 11103 TANGNTL BX SKIN EA SEP/ADDL: CPT | Mod: GC | Performed by: DERMATOLOGY

## 2023-08-15 PROCEDURE — 88305 TISSUE EXAM BY PATHOLOGIST: CPT | Mod: TC | Performed by: DERMATOLOGY

## 2023-08-15 RX ORDER — METOPROLOL SUCCINATE 50 MG/1
1 TABLET, EXTENDED RELEASE ORAL DAILY
COMMUNITY
Start: 2023-08-11

## 2023-08-15 RX ORDER — TORSEMIDE 10 MG/1
1 TABLET ORAL DAILY
COMMUNITY
Start: 2023-04-26

## 2023-08-15 RX ORDER — LANOLIN ALCOHOL/MO/W.PET/CERES
400 CREAM (GRAM) TOPICAL
COMMUNITY
Start: 2023-04-28

## 2023-08-15 ASSESSMENT — PAIN SCALES - GENERAL: PAINLEVEL: NO PAIN (0)

## 2023-08-15 NOTE — PATIENT INSTRUCTIONS
Wound Care After a Biopsy    What is a skin biopsy?  A skin biopsy allows the doctor to examine a very small piece of tissue under the microscope to determine the diagnosis and the best treatment for the skin condition. A local anesthetic (numbing medicine) is injected with a very small needle into the skin area to be tested. A small piece of skin is taken from the area. Sometimes a suture (stitch) is used.     What are the risks of a skin biopsy?  I will experience scar, bleeding, swelling, pain, crusting and redness. I may experience incomplete removal or recurrence. Risks of this procedure are excessive bleeding, bruising, infection, nerve damage, numbness, thick (hypertrophic or keloidal) scar and non-diagnostic biopsy.    How should I care for my wound for the first 24 hours?  Keep the wound dry and covered for 24 hours  If it bleeds, hold direct pressure on the area for 15 minutes. If bleeding does not stop, call us or go to the emergency room  Avoid strenuous exercise the first 1-2 days or as your doctor instructs you    How should I care for the wound after 24 hours?  After 24 hours, remove the bandage  You may bathe or shower as normal  If you had a scalp biopsy, you can shampoo as usual and can use shower water to clean the biopsy site daily  Clean the wound once a day with gentle soap and water  Do not scrub, be gentle  Apply white petroleum/Vaseline after cleaning the wound with a cotton swab or a clean finger, and keep the site covered with a Bandaid /bandage. Bandages are not necessary with a scalp biopsy  If you are unable to cover the site with a Bandaid /bandage, re-apply ointment 2-3 times a day to keep the site moist. Moisture will help with healing  Avoid strenuous activity for first 1-2 days  Avoid lakes, rivers, pools, and oceans until the stitches are removed or the site is healed    How do I clean my wound?  Wash hands thoroughly with soap or use hand  before all wound care  Clean  the wound with gentle soap and water  Apply white petroleum/Vaseline  to wound after it is clean  Replace the Bandaid /bandage to keep the wound covered for the first few days or as instructed by your doctor  If you had a scalp biopsy, warm shower water to the area on a daily basis should suffice    What should I use to clean my wound?   Cotton-tipped applicators (Qtips )  White petroleum jelly (Vaseline ). Use a clean new container and use Q-tips to apply.  Bandaids  as needed  Gentle soap     How should I care for my wound long term?  Do not get your wound dirty  Keep up with wound care for one week or until the area is healed.  A small scab will form and fall off by itself when the area is completely healed. The area will be red and will become pink in color as it heals. Sun protection is very important for how your scar will turn out. Sunscreen with an SPF 30 or greater is recommended once the area is healed.  You should have some soreness but it should be mild and slowly go away over several days. Talk to your doctor about using tylenol for pain,    When should I call my doctor?  If you have increased:   Pain or swelling  Pus or drainage (clear or slightly yellow drainage is ok)  Temperature over 100F  Spreading redness or warmth around wound    When will I hear about my results?  The biopsy results can take 2 weeks to come back.  Your results will automatically release to [email or web address] before your provider has even reviewed them.  The clinic will call you with the results, send you a [email or web address] message, or have you schedule a follow-up clinic or phone time to discuss the results.  Contact our clinics if you do not hear from us in 2 weeks.    Who should I call with questions?  [facility]: [phone]  [facility]: [phone]  For urgent needs outside of business hours call the [facility] at [phone] and ask for the dermatology resident on call

## 2023-08-15 NOTE — NURSING NOTE
Lidocaine-epinephrine 1-1:013082 % injection   1.5mL once for one use, starting 8/15/2023 ending 8/15/2023,  2mL disp, R-0, injection  Injected by Chio Lorenzana LPN

## 2023-08-15 NOTE — PROGRESS NOTES
[facility] Dermatology Note  Encounter Date: Aug 15, 2023  Office Visit     Dermatology Problem List:  1. Hx of NMSC  - BCC, L medial cheek near nasal sidewall, s/p shave biopsy 12/20/2022 & MMS 2/20/23  2. Lung Cancer   - Current rx: Opembrolizumab and Premetrexed  3. Actinic Keratosis, left lateral lip.  - s/p Cryo 12/20/2022  4. Irritant dermatitis, lips; resolved  5. NUB, L medial cheek, s/p shave biopsy 8/15/23, pending pathology  NUB, R upper chest, s/p shave bx 8/15/23   __________________    Assessment & Plan:    # NUB L medial cheek  Potential recurrence given proximity to recent MMS site.   -shave biopsy 8/15/23, pending path    # BCC L medial cheek s/p MMS 2/20/23  -scar healing well.    # NUB, R upper chest, s/p shave bx 8/15/23   C/f BCC on dermoscopy.     Procedures Performed:   Shave biopsy: Location(s) L medial cheek, R upper chest.  After discussion of benefits and risks including but not limited to bleeding/bruising, pain/swelling, infection, scar, incomplete removal, nerve damage/numbness, recurrence, and non-diagnostic biopsy,  verbal consent and photographs were obtained. Time-out was performed. The area was cleaned with isopropyl alcohol. 0.5mL of 1% lidocaine with epinephrine was injected to obtain adequate anesthesia of each lesion. Shave biopsy was performed. Hemostasis was achieved with aluminium chloride. Vaseline and a sterile dressing were applied. The patient tolerated the procedure and no complications were noted. The patient was provided with verbal and written post care instructions.     Follow-up: 6 month(s) in-person, or earlier for new or changing lesions     Staff and Resident:    Staffed with Dr. Gasca.     Pk García MD  Medicine-Dermatology, PGY-2  Pager: [phone]    Staff Physician Comments:   I saw and evaluated the patient with the resident and I agree with the assessment and plan.  I was present for the key portions of the above major procedure and  examination. I have made edits if needed.    Laz Gasca MD  Staff Dermatologist and Dermatopathologist  , Department of Dermatology   ____________________________________________    CC: Skin Check (Skin check after Mohs: BCC left cheek)    HPI:  Ms. Barbi Rebolledo is a(n) 89 year old female who presents today as a return patient following her MMS with Dr. Coulter in February 2023. Her chemotherapy was relatively recently changed to pemetrexed and pembrolizumab. Had a recent hospitalization for afib and was discharged 8/11/23 from [facility].     No new skin lesions. MMS scar on nose is healing well. Improved rash on her lower extremities. She is no longer using the desonide on those spots. Her lips no longer are irritated.     Patient is otherwise feeling well, without additional skin concerns.    Labs Reviewed:  N/A    Physical Exam:  Vitals: There were no vitals taken for this visit.  SKIN: Focused examination of the face was performed.  - Small nodular ulceration on the mid left cheek  - Erythematous plaque with branching telangiectasias on right upper chest  - Mid shin to the ankle, confluent pale pink erythema with small light brown areas of discoloration; improved per patient  - numerous stuck on waxy plaques on the trunk  - Well healed R thoracentesis site without erythema or scarring present  - Fine actinic changes on the temporal forehead  - No other lesions of concern on areas examined.         Medications:  Current Outpatient Medications   Medication     apixaban ANTICOAGULANT (ELIQUIS) 2.5 MG tablet     emollient (VANICREAM) cream     empagliflozin (JARDIANCE) 10 MG TABS tablet     losartan (COZAAR) 25 MG tablet     amiodarone (PACERONE) 200 MG tablet     furosemide (LASIX) 40 MG tablet     spironolactone (ALDACTONE) 25 MG tablet     No current facility-administered medications for this visit.      Past Medical History:   Patient Active Problem List   Diagnosis     BCC  (basal cell carcinoma), face     Frontal fibrosing alopecia     Xerosis of skin     Dermatitis, seborrheic     Inflamed seborrheic keratosis     SK (seborrheic keratosis)     History of nonmelanoma skin cancer     Atrial fibrillation (H)     Abnormal glandular Papanicolaou smear of cervix     Drug-induced hypokalemia     History of colonic polyps     Primary hypertension     TIA (transient ischemic attack)     Hypoxia     Atrial fibrillation with rapid ventricular response (H)     Primary malignant neoplasm of lung metastatic to other site, unspecified laterality (H)     Other fatigue     Hyponatremia     Bilateral pleural effusion     Past Medical History:   Diagnosis Date     Squamous cell carcinoma         CC Juna Pablo Sharif MD  [facility]  [address] [place] [address] on close of this encounter.

## 2023-08-15 NOTE — NURSING NOTE
Dermatology Rooming Note    Barbi Rebolledo's goals for this visit include:   Chief Complaint   Patient presents with    Skin Check     Skin check after Mohs: BCC left cheek     Chio Lorenzana LPN

## 2023-08-15 NOTE — LETTER
8/15/2023       RE: Barbi Rebolledo  [address] [facility] [address]     Dear Colleague,    Thank you for referring your patient, Barbi Rebolledo, to the [facility] DERMATOLOGY [facility]IN[facility] [place] at [facility]. Please see a copy of my visit note below.    [facility] Dermatology Note  Encounter Date: Aug 15, 2023  Office Visit     Dermatology Problem List:  1. Hx of NMSC  - BCC, L medial cheek near nasal sidewall, s/p shave biopsy 12/20/2022 & MMS 2/20/23  2. Lung Cancer   - Current rx: Opembrolizumab and Premetrexed  3. Actinic Keratosis, left lateral lip.  - s/p Cryo 12/20/2022  4. Irritant dermatitis, lips; resolved  5. NUB, L medial cheek, s/p shave biopsy 8/15/23, pending pathology  NUB, R upper chest, s/p shave bx 8/15/23   __________________    Assessment & Plan:    # NUB L medial cheek  Potential recurrence given proximity to recent MMS site.   -shave biopsy 8/15/23, pending path    # BCC L medial cheek s/p MMS 2/20/23  -scar healing well.    # NUB, R upper chest, s/p shave bx 8/15/23   C/f BCC on dermoscopy.     Procedures Performed:   Shave biopsy: Location(s) L medial cheek, R upper chest.  After discussion of benefits and risks including but not limited to bleeding/bruising, pain/swelling, infection, scar, incomplete removal, nerve damage/numbness, recurrence, and non-diagnostic biopsy,  verbal consent and photographs were obtained. Time-out was performed. The area was cleaned with isopropyl alcohol. 0.5mL of 1% lidocaine with epinephrine was injected to obtain adequate anesthesia of each lesion. Shave biopsy was performed. Hemostasis was achieved with aluminium chloride. Vaseline and a sterile dressing were applied. The patient tolerated the procedure and no complications were noted. The patient was provided with verbal and written post care instructions.     Follow-up: 6 month(s) in-person, or earlier for new or  changing lesions     Staff and Resident:    Staffed with Dr. Gasca.     Pk García MD  Medicine-Dermatology, PGY-2  Pager: [phone]    Staff Physician Comments:   I saw and evaluated the patient with the resident and I agree with the assessment and plan.  I was present for the key portions of the above major procedure and examination. I have made edits if needed.    Laz Gasca MD  Staff Dermatologist and Dermatopathologist  , Department of Dermatology   ____________________________________________    CC: Skin Check (Skin check after Mohs: BCC left cheek)    HPI:  Ms. Barbi Rebolledo is a(n) 89 year old female who presents today as a return patient following her MMS with Dr. Coulter in February 2023. Her chemotherapy was relatively recently changed to pemetrexed and pembrolizumab. Had a recent hospitalization for afib and was discharged 8/11/23 from [facility].     No new skin lesions. MMS scar on nose is healing well. Improved rash on her lower extremities. She is no longer using the desonide on those spots. Her lips no longer are irritated.     Patient is otherwise feeling well, without additional skin concerns.    Labs Reviewed:  N/A    Physical Exam:  Vitals: There were no vitals taken for this visit.  SKIN: Focused examination of the face was performed.  - Small nodular ulceration on the mid left cheek  - Erythematous plaque with branching telangiectasias on right upper chest  - Mid shin to the ankle, confluent pale pink erythema with small light brown areas of discoloration; improved per patient  - numerous stuck on waxy plaques on the trunk  - Well healed R thoracentesis site without erythema or scarring present  - Fine actinic changes on the temporal forehead  - No other lesions of concern on areas examined.         Medications:  Current Outpatient Medications   Medication    apixaban ANTICOAGULANT (ELIQUIS) 2.5 MG tablet    emollient (VANICREAM) cream    empagliflozin  (JARDIANCE) 10 MG TABS tablet    losartan (COZAAR) 25 MG tablet    amiodarone (PACERONE) 200 MG tablet    furosemide (LASIX) 40 MG tablet    spironolactone (ALDACTONE) 25 MG tablet     No current facility-administered medications for this visit.      Past Medical History:   Patient Active Problem List   Diagnosis    BCC (basal cell carcinoma), face    Frontal fibrosing alopecia    Xerosis of skin    Dermatitis, seborrheic    Inflamed seborrheic keratosis    SK (seborrheic keratosis)    History of nonmelanoma skin cancer    Atrial fibrillation (H)    Abnormal glandular Papanicolaou smear of cervix    Drug-induced hypokalemia    History of colonic polyps    Primary hypertension    TIA (transient ischemic attack)    Hypoxia    Atrial fibrillation with rapid ventricular response (H)    Primary malignant neoplasm of lung metastatic to other site, unspecified laterality (H)    Other fatigue    Hyponatremia    Bilateral pleural effusion     Past Medical History:   Diagnosis Date    Squamous cell carcinoma         CC Juan Pablo Sharif MD  [facility]  [address] [place] [address] on close of this encounter.

## 2023-08-16 LAB
PATH REPORT.COMMENTS IMP SPEC: ABNORMAL
PATH REPORT.COMMENTS IMP SPEC: YES
PATH REPORT.FINAL DX SPEC: ABNORMAL
PATH REPORT.GROSS SPEC: ABNORMAL
PATH REPORT.MICROSCOPIC SPEC OTHER STN: ABNORMAL
PATH REPORT.RELEVANT HX SPEC: ABNORMAL

## 2023-08-17 ENCOUNTER — TELEPHONE (OUTPATIENT)
Dept: DERMATOLOGY | Facility: CLINIC | Age: 88
End: 2023-08-17
Payer: MEDICARE

## 2023-08-17 DIAGNOSIS — C44.91 BCC (BASAL CELL CARCINOMA): Primary | ICD-10-CM

## 2023-08-21 ENCOUNTER — TELEPHONE (OUTPATIENT)
Dept: DERMATOLOGY | Facility: CLINIC | Age: 88
End: 2023-08-21
Payer: MEDICARE

## 2023-08-21 NOTE — TELEPHONE ENCOUNTER
Called patient to schedule surgery with Dr. Coulter    Date of Surgery: 10/11    Surgery type: mohs    Consult scheduled: No    Has patient had mohs with us before? Yes    Additional comments: rigoberto James on 8/21/2023 at 8:40 AM

## 2023-08-22 NOTE — TELEPHONE ENCOUNTER
FUTURE VISIT INFORMATION      FUTURE VISIT INFORMATION:  Date: 10.11.23  Time: 9:00  Location: [facility]  REFERRAL INFORMATION:  Referring provider:  Elo  Referring providers clinic:  Derm  Reason for visit/diagnosis  Left Medial Cheek, BCC. return pt.      RECORDS REQUESTED FROM:       Clinic name Comments Records Status Imaging Status   Derm 8.15.23  Path # [id number] Epic Epic

## 2023-09-26 ENCOUNTER — OFFICE VISIT (OUTPATIENT)
Dept: OTOLARYNGOLOGY | Facility: CLINIC | Age: 88
End: 2023-09-26
Payer: MEDICARE

## 2023-09-26 ENCOUNTER — PRE VISIT (OUTPATIENT)
Dept: OTOLARYNGOLOGY | Facility: CLINIC | Age: 88
End: 2023-09-26

## 2023-09-26 VITALS — HEIGHT: 61 IN | BODY MASS INDEX: 17.75 KG/M2 | WEIGHT: 94 LBS

## 2023-09-26 DIAGNOSIS — H90.3 SENSORINEURAL HEARING LOSS (SNHL) OF BOTH EARS: ICD-10-CM

## 2023-09-26 DIAGNOSIS — H61.23 EXCESSIVE CERUMEN IN BOTH EAR CANALS: Primary | ICD-10-CM

## 2023-09-26 PROCEDURE — 92504 EAR MICROSCOPY EXAMINATION: CPT | Performed by: REGISTERED NURSE

## 2023-09-26 PROCEDURE — 99203 OFFICE O/P NEW LOW 30 MIN: CPT | Mod: 25 | Performed by: REGISTERED NURSE

## 2023-09-26 RX ORDER — ASCORBIC ACID 100 MG
TABLET,CHEWABLE ORAL
COMMUNITY

## 2023-09-26 NOTE — PROGRESS NOTES
Otolaryngology Clinic  September 26, 2023    Chief Complaint:   Left cerumen impaction       History of Present Illness:   Barbi Rebolledo is a 89 year old female who presents today for cerumen impaction removal. Patient was seen by PCP for an annual follow up visit. Found to have cerumen impaction bilaterally and had bilateral ear irrigation.  Patient states that she was told she had residual cotton from Q-tip in left ear canal that could not be removed.  Recommended follow-up in ENT for removal under microscopy.    Today, patient denies any otalgia, otorrhea, history of frequent ear infections, or ear surgeries.  Patient has been encouraged by her family to have her hearing tested due to more difficulty with understanding speech in conversations.  Denies any family history of hearing loss.    Past Medical History:  Past Medical History:   Diagnosis Date    Squamous cell carcinoma        Past Surgical History:  Past Surgical History:   Procedure Laterality Date    ANESTHESIA CARDIOVERSION N/A 3/27/2023    Procedure: Anesthesia cardioversion;  Surgeon: GENERIC ANESTHESIA PROVIDER;  Location: [facility] OR    ANESTHESIA CARDIOVERSION N/A 4/11/2023    Procedure: Anesthesia cardioversion;  Surgeon: GENERIC ANESTHESIA PROVIDER;  Location: [facility] OR    MOHS MICROGRAPHIC PROCEDURE         Medications:  Current Outpatient Medications   Medication Sig Dispense Refill    apixaban ANTICOAGULANT (ELIQUIS) 2.5 MG tablet Take 1 tablet (2.5 mg) by mouth 2 times daily 180 tablet 3    Ascorbic Acid (VITAMIN C) 100 MG CHEW       CHOLECALCIFEROL 25 MCG (1000 UT) tablet Take 25 mcg by mouth      empagliflozin (JARDIANCE) 10 MG TABS tablet Take 1 tablet (10 mg) by mouth daily 90 tablet 1    folic acid (FOLVITE) 400 MCG tablet Take 400 mcg by mouth      losartan (COZAAR) 25 MG tablet Take 1 tablet (25 mg) by mouth daily 90 tablet 3    metoprolol succinate ER (TOPROL XL) 50 MG 24 hr tablet Take 1 tablet by mouth daily      spironolactone  "(ALDACTONE) 25 MG tablet Take 0.5 tablets (12.5 mg) by mouth daily 45 tablet 3    torsemide (DEMADEX) 10 MG tablet Take 1 tablet by mouth daily      amiodarone (PACERONE) 200 MG tablet 1 tablet (200 mg) by Oral or FT or NG tube route daily Start on 4/17/23. (Patient not taking: Reported on 8/15/2023) 90 tablet 3    emollient (VANICREAM) cream Apply topically daily to entire body, especially after bathing. (Patient not taking: Reported on 9/26/2023) 453 g 11    furosemide (LASIX) 40 MG tablet Take 1 tablet (40 mg) by mouth daily (Patient not taking: Reported on 4/20/2023) 90 tablet 3       Allergies:  Allergies   Allergen Reactions    Brevital Sodium [Methohexital] Fatigue     Was aware of surroundings but unable to move, gave her significant anxiety after given for DCCV 3 hours after administered 30g    Fentanyl Other (See Comments)     Sedation    Lisinopril Swelling     Swelling of tongue    Metoprolol Dizziness and Cough    Novocain [Procaine] Other (See Comments)     Tachycardia        Social History:  Social History     Tobacco Use    Smoking status: Never    Smokeless tobacco: Never       ROS: 10 point ROS neg other than the symptoms noted above in the HPI.    Physical Exam:    Ht 1.549 m (5' 1\")   Wt 42.6 kg (94 lb)   BMI 17.76 kg/m       Constitutional:  The patient was unaccompanied, well-groomed, and in no acute distress.     Skin: Normal:  warm and pink without rash    Neurologic: Alert and oriented x 3.  CN's III-XII within normal limits.  Voice normal.    Psychiatric: The patient's affect was calm, cooperative, and appropriate.     Communication:  Normal; communicates verbally, normal voice quality.    Respiratory: Breathing comfortably without stridor or exertion of accessory muscles.    Ears: Pinnae and tragus non-tender.        Otologic microscope exam:    Right ear was examined under the microscope.  Scant amount of cerumen at entrance of ear canal.  This was removed using alligator forceps.  " Normal appearing TM, nicely aerated middle ear space.      Left ear was also examined under the microscope.  Dry, scaly ear canal skin filling canal.  This was removed using alligator forceps.  When peeling skin from canal, small hematoma formed at inferior canal due to fragile skin.  Normal appearing TM, nicely aerated middle ear space.      Assessment and Plan:  1. Excessive cerumen in both ear canals  Patient with history of excessive cerumen bilaterally.  Ears were cleaned under microscopy today.  There was concern for residual cotton in left ear canal but this appears to just be dry, scaled skin from canal.  I did not observe any foreign body within the left ear canal.  Recommend patient monitor symptoms at this time and can return to clinic for ear cleaning for any further impaction.    2. Sensorineural hearing loss (SNHL) of both ears  Patient reports family concern of hearing loss.  Will have patient schedule audiogram and I will review audio once completed and contact patient with results/recommendations.     Patient will follow up as needed.    Renée Bill DNP, APRN, CNP  Otolaryngology  Head & Neck Surgery  [phone]    30 minutes spent by me on the date of the encounter doing chart review, history and exam, documentation and further activities per the note excluding time spent performing ear cleaning under microscopy.

## 2023-09-26 NOTE — LETTER
9/26/2023       RE: Barbi Rebolledo  [address] [facility] [address] [address]     Dear Colleague,    Thank you for referring your patient, Barbi Rebolledo, to the [facility] EAR NOSE AND THROAT CLINIC [place] at [facility]. Please see a copy of my visit note below.      Otolaryngology Clinic  September 26, 2023    Chief Complaint:   Left cerumen impaction       History of Present Illness:   Barbi Rebolledo is a 89 year old female who presents today for cerumen impaction removal. Patient was seen by PCP for an annual follow up visit. Found to have cerumen impaction bilaterally and had bilateral ear irrigation.  Patient states that she was told she had residual cotton from Q-tip in left ear canal that could not be removed.  Recommended follow-up in ENT for removal under microscopy.    Today, patient denies any otalgia, otorrhea, history of frequent ear infections, or ear surgeries.  Patient has been encouraged by her family to have her hearing tested due to more difficulty with understanding speech in conversations.  Denies any family history of hearing loss.    Past Medical History:  Past Medical History:   Diagnosis Date    Squamous cell carcinoma        Past Surgical History:  Past Surgical History:   Procedure Laterality Date    ANESTHESIA CARDIOVERSION N/A 3/27/2023    Procedure: Anesthesia cardioversion;  Surgeon: GENERIC ANESTHESIA PROVIDER;  Location: [facility] OR    ANESTHESIA CARDIOVERSION N/A 4/11/2023    Procedure: Anesthesia cardioversion;  Surgeon: GENERIC ANESTHESIA PROVIDER;  Location: [facility] OR    MOHS MICROGRAPHIC PROCEDURE         Medications:  Current Outpatient Medications   Medication Sig Dispense Refill    apixaban ANTICOAGULANT (ELIQUIS) 2.5 MG tablet Take 1 tablet (2.5 mg) by mouth 2 times daily 180 tablet 3    Ascorbic Acid (VITAMIN C) 100 MG CHEW       CHOLECALCIFEROL 25 MCG (1000 UT) tablet Take 25 mcg by mouth      empagliflozin (JARDIANCE)  "10 MG TABS tablet Take 1 tablet (10 mg) by mouth daily 90 tablet 1    folic acid (FOLVITE) 400 MCG tablet Take 400 mcg by mouth      losartan (COZAAR) 25 MG tablet Take 1 tablet (25 mg) by mouth daily 90 tablet 3    metoprolol succinate ER (TOPROL XL) 50 MG 24 hr tablet Take 1 tablet by mouth daily      spironolactone (ALDACTONE) 25 MG tablet Take 0.5 tablets (12.5 mg) by mouth daily 45 tablet 3    torsemide (DEMADEX) 10 MG tablet Take 1 tablet by mouth daily      amiodarone (PACERONE) 200 MG tablet 1 tablet (200 mg) by Oral or FT or NG tube route daily Start on 4/17/23. (Patient not taking: Reported on 8/15/2023) 90 tablet 3    emollient (VANICREAM) cream Apply topically daily to entire body, especially after bathing. (Patient not taking: Reported on 9/26/2023) 453 g 11    furosemide (LASIX) 40 MG tablet Take 1 tablet (40 mg) by mouth daily (Patient not taking: Reported on 4/20/2023) 90 tablet 3       Allergies:  Allergies   Allergen Reactions    Brevital Sodium [Methohexital] Fatigue     Was aware of surroundings but unable to move, gave her significant anxiety after given for DCCV 3 hours after administered 30g    Fentanyl Other (See Comments)     Sedation    Lisinopril Swelling     Swelling of tongue    Metoprolol Dizziness and Cough    Novocain [Procaine] Other (See Comments)     Tachycardia        Social History:  Social History     Tobacco Use    Smoking status: Never    Smokeless tobacco: Never       ROS: 10 point ROS neg other than the symptoms noted above in the HPI.    Physical Exam:    Ht 1.549 m (5' 1\")   Wt 42.6 kg (94 lb)   BMI 17.76 kg/m       Constitutional:  The patient was unaccompanied, well-groomed, and in no acute distress.     Skin: Normal:  warm and pink without rash    Neurologic: Alert and oriented x 3.  CN's III-XII within normal limits.  Voice normal.    Psychiatric: The patient's affect was calm, cooperative, and appropriate.     Communication:  Normal; communicates verbally, normal " voice quality.    Respiratory: Breathing comfortably without stridor or exertion of accessory muscles.    Ears: Pinnae and tragus non-tender.        Otologic microscope exam:    Right ear was examined under the microscope.  Scant amount of cerumen at entrance of ear canal.  This was removed using alligator forceps.  Normal appearing TM, nicely aerated middle ear space.      Left ear was also examined under the microscope.  Dry, scaly ear canal skin filling canal.  This was removed using alligator forceps.  When peeling skin from canal, small hematoma formed at inferior canal due to fragile skin.  Normal appearing TM, nicely aerated middle ear space.      Assessment and Plan:  1. Excessive cerumen in both ear canals  Patient with history of excessive cerumen bilaterally.  Ears were cleaned under microscopy today.  There was concern for residual cotton in left ear canal but this appears to just be dry, scaled skin from canal.  I did not observe any foreign body within the left ear canal.  Recommend patient monitor symptoms at this time and can return to clinic for ear cleaning for any further impaction.    2. Sensorineural hearing loss (SNHL) of both ears  Patient reports family concern of hearing loss.  Will have patient schedule audiogram and I will review audio once completed and contact patient with results/recommendations.     Patient will follow up as needed.    Renée Bill DNP, APRN, CNP  Otolaryngology  Head & Neck Surgery  [phone]    30 minutes spent by me on the date of the encounter doing chart review, history and exam, documentation and further activities per the note excluding time spent performing ear cleaning under microscopy.      Again, thank you for allowing me to participate in the care of your patient.      Sincerely,    Annabelle Bill, NP

## 2023-10-03 ENCOUNTER — OFFICE VISIT (OUTPATIENT)
Dept: AUDIOLOGY | Facility: CLINIC | Age: 88
End: 2023-10-03
Payer: MEDICARE

## 2023-10-03 DIAGNOSIS — H90.3 SENSORINEURAL HEARING LOSS (SNHL) OF BOTH EARS: ICD-10-CM

## 2023-10-03 DIAGNOSIS — H90.3 SENSORINEURAL HEARING LOSS, BILATERAL: Primary | ICD-10-CM

## 2023-10-03 PROCEDURE — 92557 COMPREHENSIVE HEARING TEST: CPT | Performed by: AUDIOLOGIST

## 2023-10-03 PROCEDURE — 92550 TYMPANOMETRY & REFLEX THRESH: CPT | Performed by: AUDIOLOGIST

## 2023-10-03 NOTE — PROGRESS NOTES
AUDIOLOGY REPORT    SUMMARY: Audiology visit completed. See audiogram for results.      RECOMMENDATIONS: Follow-up with ENT.    Sanam Duff, [facility]  Licensed Audiologist  [place] #[id number]

## 2023-10-11 ENCOUNTER — PRE VISIT (OUTPATIENT)
Dept: DERMATOLOGY | Facility: CLINIC | Age: 88
End: 2023-10-11

## 2023-11-01 ENCOUNTER — LAB REQUISITION (OUTPATIENT)
Dept: LAB | Facility: CLINIC | Age: 88
End: 2023-11-01
Payer: MEDICARE

## 2023-11-01 DIAGNOSIS — C34.91 MALIGNANT NEOPLASM OF UNSPECIFIED PART OF RIGHT BRONCHUS OR LUNG (H): ICD-10-CM

## 2023-11-01 PROCEDURE — 82306 VITAMIN D 25 HYDROXY: CPT | Mod: ORL | Performed by: NURSE PRACTITIONER

## 2023-11-01 PROCEDURE — 82607 VITAMIN B-12: CPT | Mod: ORL | Performed by: NURSE PRACTITIONER

## 2023-11-02 LAB
VIT B12 SERPL-MCNC: 965 PG/ML (ref 232–1245)
VIT D+METAB SERPL-MCNC: 52 NG/ML (ref 20–50)

## 2024-02-26 ENCOUNTER — LAB REQUISITION (OUTPATIENT)
Dept: LAB | Facility: CLINIC | Age: 89
End: 2024-02-26
Payer: MEDICARE

## 2024-02-26 DIAGNOSIS — I50.9 HEART FAILURE, UNSPECIFIED (H): ICD-10-CM

## 2024-04-04 ENCOUNTER — TRANSCRIBE ORDERS (OUTPATIENT)
Dept: OTHER | Age: 89
End: 2024-04-04

## 2024-04-04 DIAGNOSIS — R42 VERTIGO: Primary | ICD-10-CM

## 2024-04-08 ENCOUNTER — THERAPY VISIT (OUTPATIENT)
Dept: PHYSICAL THERAPY | Facility: CLINIC | Age: 89
End: 2024-04-08
Attending: NURSE PRACTITIONER
Payer: MEDICARE

## 2024-04-08 DIAGNOSIS — H81.12 BENIGN PAROXYSMAL POSITIONAL VERTIGO, LEFT: Primary | ICD-10-CM

## 2024-04-08 PROCEDURE — 95992 CANALITH REPOSITIONING PROC: CPT | Mod: GP | Performed by: PHYSICAL THERAPIST

## 2024-04-08 PROCEDURE — 97161 PT EVAL LOW COMPLEX 20 MIN: CPT | Mod: GP | Performed by: PHYSICAL THERAPIST

## 2024-04-08 NOTE — PROGRESS NOTES
PHYSICAL THERAPY EVALUATION  Type of Visit: Evaluation    See electronic medical record for Abuse and Falls Screening details.    Subjective       Presenting condition or subjective complaint: Dizziness/vertigo    Seems like when she had chemo tx's she was having trouble maybe coming out of it.  Starting in Jan the dizziness lasted longer.  She hasn't had a tx in the last 60 days due to dizziness.    Date of onset: 01/01/24    Relevant medical history: Cancer; Heart problems , CHF, Afib  Dates & types of surgery: 2022 Gamma Knife of brain twice    Prior diagnostic imaging/testing results: MRI     Prior therapy history for the same diagnosis, illness or injury: Yes 04/04/24 Chiropractor assessment and adjustment - Had her neck worked on C1.    Prior Level of Function  Transfers: Independent  Ambulation: Assistive equipment - walker since she started chemo  ADL: Independent  IADL:     Living Environment  Social support: With family members   Type of home: House   Stairs to enter the home: Yes 3 Is there a railing: Yes   Ramp: No   Stairs inside the home: No       Help at home: Self Cares (home health aide/personal care attendant, family, etc); Home management tasks (cooking, cleaning); Medication and/or finances; Home and Yard maintenance tasks; Assist for driving and community activities  Equipment owned: Walker with wheels; Standard wheelchair     Employment: Yes Artist  Hobbies/Interests:      Patient goals for therapy: Walk and restart painting at studio    Pain assessment:  Neck Pain from recent chiro tx     Objective      Cognitive Status Examination  Orientation: Oriented to person, place and time   Level of Consciousness: Alert  Follows Commands and Answers Questions: 100% of the time  Personal Safety and Judgement: Intact  Memory: Intact    OBSERVATION: Arrives with 4WW today.   INTEGUMENTARY: Intact  POSTURE: WNL  PALPATION:   RANGE OF MOTION:  Cspine ROM - Rotation to 45 degrees, extension WNLS  STRENGTH:  LE Strength WFL    BED MOBILITY:  Indo - slow paced and additional time needed    TRANSFERS:  Use of UE for sit to stand, reaches for walker.  Reaches for table while moving from chair to table    WHEELCHAIR MOBILITY:     GAIT:   Level of [place]: WFL  Assistive Device(s): Walker (standard)  Gait Deviations: Ashli decreased  Gait Distance:   Stairs:     BALANCE:  not formally assessed        SENSATION:          VESTIBULAR EVALUATION  ADDITIONAL HISTORY:  Description of symptoms:  Per chart review: She continues to suffer from vertigo. She describes this as room spinning sensation. The symptoms are related to positional change of her head.  The room can move at times, and times its jittering.    Dizzy attacks:   Start:  Jan 2024   Last attack:     Frequency of occurrences:     Length of attack:    Difficulty hearing:    Noise in ears?      Alleviates symptoms:    Worsens symptoms:  looking down, rolling over, Sleeps on her left side, can feel it rolling right side.   Activities that bring on symptoms:         Pertinent visual history:   Pertinent history of current vestibular problem:   DHI:          Infrared Goggle Exam Vestibular Suppressant in Last 24 Hours? No  Exam Completed With: Infrared goggles   Spontaneous Nystagmus Negative   Gaze Evoked Nystagmus Negative   Head Shake Horizontal Nystagmus    Positional Testing    Supine Head-Hanging Test     Left Right   Chata-Hallpike Negative Horizontal L, small latency, ~45 sec duration, reversal to Right beats with RTS   [facility] Association[facility] Supine Roll Test Horizontal R, non fatiguing negative   [place] and Lean Test - Seated, Head Bent 60 Degrees Forward Horizontal R   B[place] and Lean Test - Seated, Head Bent Backwards Horizontal L      BPPV Canal(s): L Horizontal  BPPV Type: Canalithasis, Cupulolithasis, possible short arm HC, most likely cupulo on left      Assessment & Plan   CLINICAL IMPRESSIONS  Medical Diagnosis: Vertigo    Treatment Diagnosis: (P) BPPV   Impression/Assessment:  Patient is a 89 year old female with bouts of dizziness complaints that has been present for months.  BPPV testing today was positive today for short arm horizontal canal on the left.   The following significant findings have been identified: Impaired gait and Dizziness. These impairments interfere with their ability to perform household mobility and community mobility as compared to previous level of function.     Clinical Decision Making (Complexity):  Clinical Presentation: Stable/Uncomplicated  Clinical Presentation Rationale: based on medical and personal factors listed in PT evaluation  Clinical Decision Making (Complexity): Low complexity    PLAN OF CARE  Treatment Interventions:  Interventions: Gait Training, Neuromuscular Re-education, Therapeutic Exercise, Self-Care/Home Management, Canalith Repositioning    Long Term Goals     PT Goal 1  Goal Identifier: (P) BPPV  Goal Description: (P) Patient to demonstrate cleared BPPV  Rationale: (P) to maximize safety and independence within the home;to maximize safety and independence with performance of ADLs and functional tasks  Target Date: (P) 06/03/24  PT Goal 2  Goal Identifier: (P) HEP  Goal Description: (P) Patient to be ind with HEP issued to manage any residual dizziness from BPPV  Target Date: (P) 06/03/24      Frequency of Treatment: (P) 1 time per weeks  Duration of Treatment: (P) 8 weeks    Recommended Referrals to Other Professionals:   Education Assessment:   Learner/Method: (P) Patient;Listening;Demonstration  Education Comments: (P) HEP and POC    Risks and benefits of evaluation/treatment have been explained.   Patient/Family/caregiver agrees with Plan of Care.     Evaluation Time:     PT Eval, Low Complexity Minutes (99238): (P) 25       Signing Clinician: Leticia Aviles, PT      [facility]                                                                                   OUTPATIENT PHYSICAL THERAPY      PLAN OF  TREATMENT FOR OUTPATIENT REHABILITATION   Patient's Last Name, First Name, Barbi Max YOB: 1930   Provider's Name   [facility]   Medical Record No.  [id number]     Onset Date: 01/01/24  Start of Care Date: 04/08/24     Medical Diagnosis:  Vertigo      PT Treatment Diagnosis:  (P) BPPV Plan of Treatment  Frequency/Duration: (P) 1 time per weeks/ (P) 8 weeks    Certification date from (P) 04/08/24 to (P) 06/03/24         See note for plan of treatment details and functional goals     Leticia Aviles, PT                         I CERTIFY THE NEED FOR THESE SERVICES FURNISHED UNDER        THIS PLAN OF TREATMENT AND WHILE UNDER MY CARE .             Physician Signature               Date    X_____________________________________________________                  Referring Provider:  Ken Mccarty    Initial Assessment  See Epic Evaluation- Start of Care Date: 04/08/24

## 2024-04-10 ENCOUNTER — THERAPY VISIT (OUTPATIENT)
Dept: PHYSICAL THERAPY | Facility: CLINIC | Age: 89
End: 2024-04-10
Attending: NURSE PRACTITIONER
Payer: MEDICARE

## 2024-04-10 DIAGNOSIS — H81.12 BENIGN PAROXYSMAL POSITIONAL VERTIGO, LEFT: Primary | ICD-10-CM

## 2024-04-10 PROCEDURE — 95992 CANALITH REPOSITIONING PROC: CPT | Mod: GP | Performed by: PHYSICAL THERAPIST

## 2024-04-17 ENCOUNTER — THERAPY VISIT (OUTPATIENT)
Dept: PHYSICAL THERAPY | Facility: CLINIC | Age: 89
End: 2024-04-17
Payer: MEDICARE

## 2024-04-17 DIAGNOSIS — H81.12 BENIGN PAROXYSMAL POSITIONAL VERTIGO, LEFT: Primary | ICD-10-CM

## 2024-04-17 PROCEDURE — 97112 NEUROMUSCULAR REEDUCATION: CPT | Mod: GP | Performed by: PHYSICAL THERAPIST

## 2024-04-17 NOTE — PATIENT INSTRUCTIONS
Your testing today looks like what we can  right unilateral vestibular hypofunction  Start exercise and try to move your head more!  Have fun on your trip

## 2024-05-06 ENCOUNTER — THERAPY VISIT (OUTPATIENT)
Dept: PHYSICAL THERAPY | Facility: CLINIC | Age: 89
End: 2024-05-06
Payer: MEDICARE

## 2024-05-06 DIAGNOSIS — H81.12 BENIGN PAROXYSMAL POSITIONAL VERTIGO, LEFT: Primary | ICD-10-CM

## 2024-05-06 PROCEDURE — 97112 NEUROMUSCULAR REEDUCATION: CPT | Mod: GP | Performed by: PHYSICAL THERAPIST

## 2024-05-16 ENCOUNTER — THERAPY VISIT (OUTPATIENT)
Dept: PHYSICAL THERAPY | Facility: CLINIC | Age: 89
End: 2024-05-16
Payer: MEDICARE

## 2024-05-16 DIAGNOSIS — H81.12 BENIGN PAROXYSMAL POSITIONAL VERTIGO, LEFT: Primary | ICD-10-CM

## 2024-05-16 PROCEDURE — 97112 NEUROMUSCULAR REEDUCATION: CPT | Mod: GP | Performed by: PHYSICAL THERAPIST

## 2024-05-21 ENCOUNTER — LAB REQUISITION (OUTPATIENT)
Dept: LAB | Facility: CLINIC | Age: 89
End: 2024-05-21
Payer: MEDICARE

## 2024-05-21 DIAGNOSIS — R53.83 OTHER FATIGUE: ICD-10-CM

## 2024-05-21 PROCEDURE — 80076 HEPATIC FUNCTION PANEL: CPT | Mod: ORL | Performed by: NURSE PRACTITIONER

## 2024-05-22 LAB
ALBUMIN SERPL BCG-MCNC: 4.1 G/DL (ref 3.5–5.2)
ALP SERPL-CCNC: 108 U/L (ref 40–150)
ALT SERPL W P-5'-P-CCNC: 35 U/L (ref 0–50)
AST SERPL W P-5'-P-CCNC: 31 U/L (ref 0–45)
BILIRUB DIRECT SERPL-MCNC: <0.2 MG/DL (ref 0–0.3)
BILIRUB SERPL-MCNC: 0.4 MG/DL
PROT SERPL-MCNC: 6.4 G/DL (ref 6.4–8.3)

## 2024-05-29 ENCOUNTER — THERAPY VISIT (OUTPATIENT)
Dept: PHYSICAL THERAPY | Facility: CLINIC | Age: 89
End: 2024-05-29
Payer: MEDICARE

## 2024-05-29 DIAGNOSIS — H81.12 BENIGN PAROXYSMAL POSITIONAL VERTIGO, LEFT: Primary | ICD-10-CM

## 2024-05-29 PROCEDURE — 97112 NEUROMUSCULAR REEDUCATION: CPT | Mod: GP | Performed by: PHYSICAL THERAPIST

## 2024-11-22 ENCOUNTER — LAB REQUISITION (OUTPATIENT)
Dept: LAB | Facility: CLINIC | Age: 89
End: 2024-11-22
Payer: MEDICARE

## 2024-11-22 DIAGNOSIS — I48.21 PERMANENT ATRIAL FIBRILLATION (H): ICD-10-CM

## 2024-11-22 LAB
MAGNESIUM SERPL-MCNC: 2.3 MG/DL (ref 1.7–2.3)
TSH SERPL DL<=0.005 MIU/L-ACNC: 2.18 UIU/ML (ref 0.3–4.2)

## 2024-11-22 PROCEDURE — 83735 ASSAY OF MAGNESIUM: CPT | Mod: ORL

## 2024-11-22 PROCEDURE — 84443 ASSAY THYROID STIM HORMONE: CPT | Mod: ORL

## 2025-03-01 ENCOUNTER — HEALTH MAINTENANCE LETTER (OUTPATIENT)
Age: OVER 89
End: 2025-03-01

## 2025-03-28 ENCOUNTER — OFFICE VISIT (OUTPATIENT)
Dept: AUDIOLOGY | Facility: CLINIC | Age: OVER 89
End: 2025-03-28
Payer: MEDICAID

## 2025-03-28 DIAGNOSIS — H90.3 SENSORINEURAL HEARING LOSS, BILATERAL: Primary | ICD-10-CM

## 2025-03-28 NOTE — PROGRESS NOTES
AUDIOLOGY REPORT    SUBJECTIVE: Barbi Rebolledo is a 89 year old female who was seen in the Audiology Clinic at the [facility] for a fitting of Mary AI 24 Full shell hearing aids. Barbi has been seen previously on 10/03/2023, and results revealed a borderline normal sloping to severe/profound primarily sensorineural hearing loss bilaterally.  Barbi notes difficulty with communication in a variety of listening situations. She has recently undergone chemotherapy and feels her hearing may have decreased since that time. Her son accompanies her to today's appointment.  OBJECTIVE: The hearing aid conformity evaluation was completed.The hearing aids were placed and they provided a good fit. Real-ear-probe-microphone measurements were completed on the [employer] system and were a good match to NAL-NL1 target with soft sounds audible, moderate sounds comfortable, and loud sounds below discomfort. UCLs are verified through maximum power output measures and demonstrate appropriate limiting of loud inputs. Barbi was oriented to proper hearing aid use, care, cleaning (no water, dry brush), batteries (size recharegable, insertion/removal, toxicity, low-battery signal), aid insertion/removal, user booklet, warranty information, storage cases, and other hearing aid details. The patient confirmed understanding of hearing aid use and care, and showed proper insertion of hearing aid and batteries while in the office today.Barbi reported good volume and sound quality today.   Hearing aids were programmed as follows:  Program 1:audio only no VC  Patient does not have a smart phone.  EAR(S) FIT: Bilateral  HEARING AID MODEL NAME: Mary AI 24 Full shell   HEARING AID STYLE: Full Shell in-the-ear  SERIAL NUMBERS: Right: [id number]  Left: [id number]  WARRANTY END DATE: 03/12/2023   ASSESSMENT: Mary AI 24 Full shell hearing aid(s) were fit today. Verification measures were performed. Barbi  signed the Hearing Aid Purchase Agreement and was given a copy, as well as details on her hearing aids. Patient was counseled that exact out of pocket amounts cannot be determined for hearing aid claims being sent to insurance. Any insurance coverage information presented to the patient is an estimate only, and is not a guarantee of payment. Patient has been advised to check with their own insurance.    PLAN:Barbi will return for follow-up in 2-3 weeks for a hearing aid review appointment. Please call this clinic with questions regarding today s appointment.      Scooter Felton., [facility]  Licensed Audiologist  [place] #[id number]

## (undated) RX ORDER — LIDOCAINE HYDROCHLORIDE 20 MG/ML
SOLUTION OROPHARYNGEAL
Status: DISPENSED
Start: 2023-03-27